# Patient Record
Sex: MALE | Race: WHITE | Employment: OTHER | URBAN - METROPOLITAN AREA
[De-identification: names, ages, dates, MRNs, and addresses within clinical notes are randomized per-mention and may not be internally consistent; named-entity substitution may affect disease eponyms.]

---

## 2017-06-27 ENCOUNTER — OFFICE VISIT (OUTPATIENT)
Dept: NEUROLOGY | Age: 72
End: 2017-06-27

## 2017-06-27 VITALS
RESPIRATION RATE: 16 BRPM | WEIGHT: 252 LBS | BODY MASS INDEX: 35.28 KG/M2 | OXYGEN SATURATION: 97 % | DIASTOLIC BLOOD PRESSURE: 76 MMHG | SYSTOLIC BLOOD PRESSURE: 142 MMHG | HEART RATE: 71 BPM | HEIGHT: 71 IN

## 2017-06-27 DIAGNOSIS — G30.1 DEMENTIA OF THE ALZHEIMER'S TYPE WITH LATE ONSET WITHOUT BEHAVIORAL DISTURBANCE (HCC): Primary | ICD-10-CM

## 2017-06-27 DIAGNOSIS — F02.80 DEMENTIA OF THE ALZHEIMER'S TYPE WITH LATE ONSET WITHOUT BEHAVIORAL DISTURBANCE (HCC): Primary | ICD-10-CM

## 2017-06-27 RX ORDER — DIPHENHYDRAMINE HCL 25 MG
25 CAPSULE ORAL
COMMUNITY
End: 2017-12-28 | Stop reason: CLARIF

## 2017-06-27 RX ORDER — IRBESARTAN 150 MG/1
TABLET ORAL
Refills: 6 | COMMUNITY
Start: 2017-06-13 | End: 2018-04-05 | Stop reason: CLARIF

## 2017-06-27 RX ORDER — DIVALPROEX SODIUM 500 MG/1
TABLET, EXTENDED RELEASE ORAL DAILY
COMMUNITY
Start: 2017-06-25 | End: 2019-02-22 | Stop reason: SDUPTHER

## 2017-06-27 NOTE — LETTER
6/27/2017 9:39 PM 
 
Patient:  Sienna Jimenez YOB: 1945 Date of Visit: 6/27/2017 Dear No Recipients: Thank you for referring Mr. Mane Colón to me for evaluation/treatment. Below are the relevant portions of my assessment and plan of care. Consult Subjective:  
 
Sienna Jimenez is a 73 y. o.right-handed  male seen today for evaluation of new problem of increasing confusion and memory loss and progressive dementia and increasing unsteady gait at the request of Dr. Maira Kirk. Patient cannot use a cane or walker successfully because he does not have the cognitive ability to handle them. Patient cognition continues to deteriorate rather fast, the family is trying to handle him at home, and does have sitters in the house 7 days a week now for about 7 hours a day. They give the patient his medication and his wife to her medication, arrange the meals, and try to get him to exercise some. The family does not want to place him in a nursing home and the patient themselves absolutely refuse to go to assisted living. He is doing better since placed on Depakote 500 mg twice a day by his psychiatrist, but seems to be getting a little bit away but the family thinks is stable now. Neurontin seemed to make him worse. He is no longer driving. He has had progression of disease and no longer goes to work and his  watches him closely. Family has arranged a power of , living will and are considering long-term management. We discussed his condition with his family and office visit was 40 minutes today, 10 minutes of which was counseling the family. We tried a home health evaluation and therapy, but neither he nor his wife tolerated strangers in the house very well. The family notices that he is having more difficulty finishing sentences and finishing conversation.  He is also taking Lexapro, and with some evidence that the SSRIs helped dementia, we will keep him on that. He tries to exercise, take his vitamins and vitamin D., and remained mentally and physically active. He has had no other new focal weakness sensory loss visual changes or gait problems or bowel or bladder problems. He has had a normal CT of the head and normal metabolic screening for treatable causes of his memory loss. Wife has verified with the patient's  that he seems to be doing his job while, and not having any problems. 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 He wants to be has proactive as possible in treating his illness. He is having more problems dealing with the stress of his wife's illness and memory loss and her obsessive behavior. We had long counseling with patient and wife and her daughters, and he is to continue his Lexapro for anxiety and depression, and his wife's medications were adjusted. We also talked to the daughters, about family therapy. The patient has not had any new medical problems complications and or illnesses. He has a past history of memory loss, high blood pressure, anxiety and vasectomy. Past Medical History:  
Diagnosis Date  Anxiety disorder  Depression  Essential hypertension  Memory disorder  Neurological disorder Past Surgical History:  
Procedure Laterality Date [address] Family History Problem Relation Age of Onset  Dementia Mother  Heart Disease Father Social History Substance Use Topics  Smoking status: Never Smoker  Smokeless tobacco: Never Used  Alcohol use No  
   
Current Outpatient Prescriptions Medication Sig Dispense Refill  divalproex ER (DEPAKOTE ER) 500 mg ER tablet  irbesartan (AVAPRO) 150 mg tablet TAKE 1 TABLET BY MOUTH EVERY DAY  6  
 diphenhydrAMINE (BENADRYL) 25 mg capsule Take 25 mg by mouth nightly.  memantine-donepezil (NAMZARIC) 28-10 mg CSpX Take 1 Cap by mouth daily. 30 Cap 11  
 escitalopram oxalate (LEXAPRO) 20 mg tablet TAKE ONE TABLET BY MOUTH ONE TIME DAILY 30 Tab 11  
 spironolactone (ALDACTONE) 25 mg tablet  tamsulosin (FLOMAX) 0.4 mg capsule Take 0.4 mg by mouth daily.  vitamin e 400 unit tab Take  by mouth.  VIT C/TERELL AC/LUT/COPPER/ZNOX (PRESERVISION LUTEIN PO) Take  by mouth.  aspirin 81 mg tablet Take 81 mg by mouth.  multivitamins-minerals-lutein (CENTRUM SILVER) Tab Take  by mouth.  Cholecalciferol, Vitamin D3, (VITAMIN D3) 1,000 unit cap Take  by mouth. No Known Allergies Review of Systems: A comprehensive review of systems was negative except for: Neurological: positive for memory problems Objective: I 
 
 
NEUROLOGICAL EXAM: 
 
Appearance: The patient is well developed, well nourished, provides a incoherent history and is in no acute distress. Mental Status: Oriented to place and person not the president and not the date. Patient can not remember one of 3 words at 30 seconds with distraction. Difficulty naming his children initially. Patient still a little mentally slow and has difficulty with words and names at times. Patient has difficult time doing clock drawing, and has moderate recent memory loss. Mood and affect appropriate. Cranial Nerves:   Intact visual fields. Fundi are benign. JAQUELIN, EOM's full, no nystagmus, no ptosis. Facial sensation is normal. Corneal reflexes are not tested. Facial movement is symmetric. Hearing is abnormal bilaterally. Palate is midline with normal sternocleidomastoid and trapezius muscles are normal. Tongue is midline. Neck without meningismus or bruits Motor:  4/5 strength in upper and lower proximal and distal muscles. Normal bulk and tone. No fasciculations. Reflexes:   Deep tendon reflexes 2+/4 and symmetrical. 
No Babinski or clonus present Sensory:   Normal to touch, pinprick and vibration and DSS. Gait:  Abnormal gait as patient is unsteady when he initially gets up and starts to move and on turns. Tremor:   No tremor noted. Cerebellar:  Abnormal Romberg and tandem cerebellar signs present. Neurovascular:  Normal heart sounds and regular rhythm, peripheral pulses decreased, and no carotid bruits. Assessment:  
 
 
 
Plan: Moderate to severe dementia consistent with Alzheimer's disease Patient to continue Depakote 500 mg twice daily which seems to be providing some control of agitation and confusion We advised him to watch his weight gain, and the family will monitor that. He is to continue Lexapro, Namzaric and neither melatonin nor Neurontin were tolerated He is to continue his multivitamins and vitamin D on a regular basis and remained mentally and physically active He is no longer driving or working at this time Patient condition discussed with family and 20 minutes spent counseling the family, about the disease, medications and prognosis, and they were advised to start planning for long-term care, get power of  and living Will We will see him again in 6 months, earlier if needed. He will call if any problems. Discussed his diagnosis, treatment options and the possibility of new medications in the future with thepatient and his familyI am that it little Signed By: Malena Cuellar MD   
 June 27, 2017 If you have questions, please do not hesitate to call me. I look forward to following Mr. Marvel Severance along with you. Sincerely, Malena Cuellar MD

## 2017-06-27 NOTE — PATIENT INSTRUCTIONS

## 2017-06-28 NOTE — PROGRESS NOTES
Consult    Subjective:     Donovan Tierney is a 73 y. o.right-handed  male seen today for evaluation of new problem of increasing confusion and memory loss and progressive dementia and increasing unsteady gait at the request of Dr. Cristiana Dia. Patient cannot use a cane or walker successfully because he does not have the cognitive ability to handle them. Patient cognition continues to deteriorate rather fast, the family is trying to handle him at home, and does have sitters in the house 7 days a week now for about 7 hours a day. They give the patient his medication and his wife to her medication, arrange the meals, and try to get him to exercise some. The family does not want to place him in a nursing home and the patient themselves absolutely refuse to go to assisted living. He is doing better since placed on Depakote 500 mg twice a day by his psychiatrist, but seems to be getting a little bit away but the family thinks is stable now. Neurontin seemed to make him worse. He is no longer driving. He has had progression of disease and no longer goes to work and his  watches him closely. Family has arranged a power of , living will and are considering long-term management. We discussed his condition with his family and office visit was 40 minutes today, 10 minutes of which was counseling the family. We tried a home health evaluation and therapy, but neither he nor his wife tolerated strangers in the house very well. The family notices that he is having more difficulty finishing sentences and finishing conversation. He is also taking Lexapro, and with some evidence that the SSRIs helped dementia, we will keep him on that. He tries to exercise, take his vitamins and vitamin D., and remained mentally and physically active. He has had no other new focal weakness sensory loss visual changes or gait problems or bowel or bladder problems.  He has had a normal CT of the head and normal metabolic screening for treatable causes of his memory loss. Wife has verified with the patient's  that he seems to be doing his job while, and not having any problems. 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 He wants to be has proactive as possible in treating his illness. He is having more problems dealing with the stress of his wife's illness and memory loss and her obsessive behavior. We had long counseling with patient and wife and her daughters, and he is to continue his Lexapro for anxiety and depression, and his wife's medications were adjusted. We also talked to the daughters, about family therapy. The patient has not had any new medical problems complications and or illnesses. He has a past history of memory loss, high blood pressure, anxiety and vasectomy. Past Medical History:   Diagnosis Date    Anxiety disorder     Depression     Essential hypertension     Memory disorder     Neurological disorder       Past Surgical History:   Procedure Laterality Date    HX VASECTOMY  67     Family History   Problem Relation Age of Onset    Dementia Mother     Heart Disease Father       Social History   Substance Use Topics    Smoking status: Never Smoker    Smokeless tobacco: Never Used    Alcohol use No       Current Outpatient Prescriptions   Medication Sig Dispense Refill    divalproex ER (DEPAKOTE ER) 500 mg ER tablet       irbesartan (AVAPRO) 150 mg tablet TAKE 1 TABLET BY MOUTH EVERY DAY  6    diphenhydrAMINE (BENADRYL) 25 mg capsule Take 25 mg by mouth nightly.  memantine-donepezil (NAMZARIC) 28-10 mg CSpX Take 1 Cap by mouth daily. 30 Cap 11    escitalopram oxalate (LEXAPRO) 20 mg tablet TAKE ONE TABLET BY MOUTH ONE TIME DAILY 30 Tab 11    spironolactone (ALDACTONE) 25 mg tablet       tamsulosin (FLOMAX) 0.4 mg capsule Take 0.4 mg by mouth daily.  vitamin e 400 unit tab Take  by mouth.       VIT C/TERELL AC/LUT/COPPER/ZNOX (PRESERVISION LUTEIN PO) Take by mouth.  aspirin 81 mg tablet Take 81 mg by mouth.  multivitamins-minerals-lutein (CENTRUM SILVER) Tab Take  by mouth.  Cholecalciferol, Vitamin D3, (VITAMIN D3) 1,000 unit cap Take  by mouth. No Known Allergies     Review of Systems:  A comprehensive review of systems was negative except for: Neurological: positive for memory problems     Objective:     I      NEUROLOGICAL EXAM:    Appearance: The patient is well developed, well nourished, provides a incoherent history and is in no acute distress. Mental Status: Oriented to place and person not the president and not the date. Patient can not remember one of 3 words at 30 seconds with distraction. Difficulty naming his children initially. Patient still a little mentally slow and has difficulty with words and names at times. Patient has difficult time doing clock drawing, and has moderate recent memory loss. Mood and affect appropriate. Cranial Nerves:   Intact visual fields. Fundi are benign. JAQUELIN, EOM's full, no nystagmus, no ptosis. Facial sensation is normal. Corneal reflexes are not tested. Facial movement is symmetric. Hearing is abnormal bilaterally. Palate is midline with normal sternocleidomastoid and trapezius muscles are normal. Tongue is midline. Neck without meningismus or bruits   Motor:  4/5 strength in upper and lower proximal and distal muscles. Normal bulk and tone. No fasciculations. Reflexes:   Deep tendon reflexes 2+/4 and symmetrical.  No Babinski or clonus present   Sensory:   Normal to touch, pinprick and vibration and DSS. Gait:  Abnormal gait as patient is unsteady when he initially gets up and starts to move and on turns. Tremor:   No tremor noted. Cerebellar:  Abnormal Romberg and tandem cerebellar signs present. Neurovascular:  Normal heart sounds and regular rhythm, peripheral pulses decreased, and no carotid bruits. Assessment:         Plan:      Moderate to severe dementia consistent with Alzheimer's disease  Patient to continue Depakote 500 mg twice daily which seems to be providing some control of agitation and confusion  We advised him to watch his weight gain, and the family will monitor that. He is to continue Lexapro, Namzaric and neither melatonin nor Neurontin were tolerated  He is to continue his multivitamins and vitamin D on a regular basis and remained mentally and physically active  He is no longer driving or working at this time  Patient condition discussed with family and 20 minutes spent counseling the family, about the disease, medications and prognosis, and they were advised to start planning for long-term care, get power of  and living Will  We will see him again in 6 months, earlier if needed. He will call if any problems.   Discussed his diagnosis, treatment options and the possibility of new medications in the future with thepatient and his familyI am that it little      Signed By: Josr Marvin MD     June 27, 2017

## 2017-09-29 DIAGNOSIS — F02.80 DEMENTIA OF THE ALZHEIMER'S TYPE WITH LATE ONSET WITHOUT BEHAVIORAL DISTURBANCE (HCC): ICD-10-CM

## 2017-09-29 DIAGNOSIS — G30.1 DEMENTIA OF THE ALZHEIMER'S TYPE WITH LATE ONSET WITHOUT BEHAVIORAL DISTURBANCE (HCC): ICD-10-CM

## 2017-09-29 NOTE — TELEPHONE ENCOUNTER
Future Appointments  Date Time Provider Ely Rowland   1/9/2018 2:00 PM Clarita Jaramillo MD [address]                         Last Appointment My Department:  6/27/2017    Please advise of refill below. Requested Prescriptions     Pending Prescriptions Disp Refills    memantine-donepezil (NAMZARIC) 28-10 mg CSpX 90 Cap 1     Sig: Take 1 Cap by mouth daily.       Patient would like 90 day fill

## 2017-10-05 RX ORDER — ESCITALOPRAM OXALATE 20 MG/1
TABLET ORAL
Qty: 30 TAB | Refills: 6 | Status: SHIPPED | OUTPATIENT
Start: 2017-10-05 | End: 2017-12-28 | Stop reason: CLARIF

## 2017-10-05 NOTE — TELEPHONE ENCOUNTER
Future Appointments  Date Time Provider Ely Janett   1/9/2018 2:00 PM Nettie Colin MD [address]                         Last Appointment My Department:  6/27/2017    Please advise of refill below.    Requested Prescriptions     Pending Prescriptions Disp Refills    escitalopram oxalate (LEXAPRO) 20 mg tablet [Pharmacy Med Name: ESCITALOPRAM 20 MG TABLET] 30 Tab 6     Sig: TAKE ONE TABLET BY MOUTH ONE TIME DAILY

## 2017-10-12 ENCOUNTER — TELEPHONE (OUTPATIENT)
Dept: NEUROLOGY | Age: 72
End: 2017-10-12

## 2017-10-12 NOTE — TELEPHONE ENCOUNTER
Patients daughter is calling regarding she is concerned about her dad. He had really declined the last few months and is extremely depressed. Dr Skye Fall is off this week and wants to know if there is something he can prescribe. He is taking Lexapro 20mg. He does not do good with anit-psychotic meds.

## 2017-10-12 NOTE — TELEPHONE ENCOUNTER
I called the patient's daughter and notified her of below information. She would like to call today to see if any changes can be made sooner than next Monday. She did notify me that instead of decreasing the Haldol, they stopped the medication all together. She stated that since being on the medication he exhibited inappropriate behavior towards nurses. Once they stopped the medication this Monday, he has not had any other episodes. Update on patient since 6/2017: In July 2017 he went to the eye doctor as his vision was decreasing. Patient has known macular degeneration, but now his vision is very low. She thinks that since then he has gone downhill. She thinks that in the past 2 weeks or so he doesn't smile, wants to sit around and do nothing, not walking, doesn't want to eat, wants to be left alone. States this is not like him at all. Patient has been on Lexapro for 20 years. She was concerned about changing his medications. I did advise her that she should discuss this with Dr [place] office as they have the most updated list of medications and what he has tried. She verbalized understanding and will call today.    I did notify her that I would also send to Dr Benjamin Beltran to review

## 2017-10-12 NOTE — TELEPHONE ENCOUNTER
I called Dr Smiley Parents office and spoke to the nurse practitioner as Dr Aristides Daniel was out of the office:  He is currently on Haldol for agitation which was cut down to once daily because his daughter thinks he is hallucinating. Ms Valeria Sommers thinks he is also on Depakote and Lexapro  There is a new antidepressant called Trintellix that they like and could start by using samples. But, Pradip Guerrier NP thinks that they should hang in to Monday to speak to Dr Aristides Daniel unless the patient's daughter feels this is more pressing and should call their office. She agrees that these medications would be best done at their office as the patient has medication changes through them.

## 2017-10-12 NOTE — TELEPHONE ENCOUNTER
I put in a call to Dr Chidi Kilgore office to discuss in case their office would like to change as they are handling these medications

## 2017-11-01 ENCOUNTER — TELEPHONE (OUTPATIENT)
Dept: NEUROLOGY | Age: 72
End: 2017-11-01

## 2017-11-01 NOTE — TELEPHONE ENCOUNTER
I spoke with the patient's daughter as she is the POA to get the permission to speak with the patient's son and ask what limitations there are in speaking with Beverley Tovar. She advised me that we are able to answer questions that he might have. She does not know why he has called, but they are not needing to change any care at this point as they are working with Dr Dionna Puente for his psychiatric care.   Left message for  Sahara Puga to call back

## 2017-11-01 NOTE — TELEPHONE ENCOUNTER
----- Message from Annita Delarosa sent at 11/1/2017  9:16 AM EDT -----  Regarding: Dr. Lillie Chapa  Pt's son Fer Carreon [phone] states he would like to know the status of where you think his dad is, states he is going backward and not progressing. Please call at your convenience.

## 2017-11-01 NOTE — TELEPHONE ENCOUNTER
Left message for patient's daughter to call back.  On permission form, it states that the son only has limited access and need to find out what can be said

## 2017-12-28 ENCOUNTER — OFFICE VISIT (OUTPATIENT)
Dept: NEUROLOGY | Age: 72
End: 2017-12-28

## 2017-12-28 VITALS
DIASTOLIC BLOOD PRESSURE: 86 MMHG | SYSTOLIC BLOOD PRESSURE: 132 MMHG | HEART RATE: 82 BPM | OXYGEN SATURATION: 95 % | HEIGHT: 71 IN

## 2017-12-28 DIAGNOSIS — G20 PARKINSON'S DISEASE (TREMOR, STIFFNESS, SLOW MOTION, UNSTABLE POSTURE) (HCC): ICD-10-CM

## 2017-12-28 DIAGNOSIS — R26.9 ABNORMALITY OF GAIT AND MOBILITY: ICD-10-CM

## 2017-12-28 DIAGNOSIS — G30.1 DEMENTIA OF THE ALZHEIMER'S TYPE WITH LATE ONSET WITHOUT BEHAVIORAL DISTURBANCE (HCC): Primary | ICD-10-CM

## 2017-12-28 DIAGNOSIS — F02.80 DEMENTIA OF THE ALZHEIMER'S TYPE WITH LATE ONSET WITHOUT BEHAVIORAL DISTURBANCE (HCC): Primary | ICD-10-CM

## 2017-12-28 RX ORDER — SERTRALINE HYDROCHLORIDE 100 MG/1
TABLET, FILM COATED ORAL
Refills: 5 | COMMUNITY
Start: 2017-12-12 | End: 2019-06-14

## 2017-12-28 RX ORDER — CARBIDOPA AND LEVODOPA 25; 100 MG/1; MG/1
1 TABLET ORAL 3 TIMES DAILY
Qty: 100 TAB | Refills: 11 | Status: SHIPPED | OUTPATIENT
Start: 2017-12-28 | End: 2018-04-03 | Stop reason: SDUPTHER

## 2017-12-28 RX ORDER — ASCORBIC ACID 500 MG
TABLET ORAL 2 TIMES DAILY
COMMUNITY
End: 2019-06-14

## 2017-12-28 NOTE — PATIENT INSTRUCTIONS
Please be advised there is a $25 fee for all paperwork to be completed from our  providers. This is to be paid by the patient prior to picking up the completed forms.

## 2017-12-28 NOTE — LETTER
12/28/2017 3:58 PM 
 
Patient:  Kayla Alonzo YOB: 1945 Date of Visit: 12/28/2017 Dear No Recipients: Thank you for referring Mr. Adair Alvarez to me for evaluation/treatment. Below are the relevant portions of my assessment and plan of care. Consult Subjective:  
 
Kayla Alonzo is a 81 y. o.right-handed  male seen today for evaluation of new problem of increasing weakness and lack of mobility and some increasing tremors more when he tries to do things and more difficulty walking which he cannot do without assistance now and increasing confusion and memory loss and progressive dementia and increasing unsteady gait at the request of Dr. Aniyah Bean. Patient seems to have developed parkinsonian symptoms, with increased generalized rigidity, slowness of movement, bradykinesia, cogwheel rigidity, but not much resting tremor but he does seem to have a little bit of an essential tremor or static tremor. He also has masklike facies. He is probably getting insomnia and from his Zyprexa. Less likely has primary Parkinson's disease. He does not actively hallucinating all that much. Patient just seems to be slower and having more difficulty getting about, and also having spells that he will get shaky all over and sometimes even gag. On reviewing the videos of these, it looks like he is just very anxious and gets very rigid and tremulous because he is confused and agitated. He tends to sleep more, and tends to snore a lot, but the daughter and the family do not think he can tolerate a sleep test, nor could he tolerate CPAP. I advised him that should try to get him sitting in an upright position or more upright position to see if he can sleep that way without snoring as much. He has had no new focal weakness, but just generally weak all over. He seems to be much more rigid and generally slow and having difficulty moving and having a masslike facies. Is really not on any neuroleptics. Patient cannot use a cane or walker successfully because he does not have the cognitive ability to handle them. Patient cognition continues to deteriorate rather fast, the family is trying to handle him at home, and does have sitters in the house 7 days a week now for 24 hours a day. They give the patient his medication and his wife to her medication, arrange the meals, and try to get him to exercise some. The family does not want to place him in a nursing home and the patient themselves absolutely refuse to go to assisted living. He is on Depakote 500 mg twice a day by his psychiatrist, and patient now on Zoloft 100 mg a day, with some recent weight loss, and also taking them Namzaric the 8 mg once a day, and on Zyprexa 5 mg a day. Neurontin seemed to make him worse that was discontinued. He is no longer driving. He has had progression of disease and no longer goes to work and his  watches him closely. Family has arranged a power of , living will and are considering long-term management. We discussed his condition with his family and office visit was 40 minutes today, 10 minutes of which was counseling the family. We tried a home health evaluation and therapy, but neither he nor his wife tolerated strangers in the house very well. The family notices that he is having more difficulty finishing sentences and finishing conversation. He is also taking Lexapro, and with some evidence that the SSRIs helped dementia, we will keep him on that. He tries to exercise, take his vitamins and vitamin D., and remained mentally and physically active. He has had no other new focal weakness sensory loss visual changes or gait problems or bowel or bladder problems.  He has had a normal CT of the head and normal metabolic screening for treatable causes of his memory loss recently on a hospital visit he was found to have urinary tract infection. We discussed urinary suppression, we will try vitamin C 1000 mg first. 
We had long counseling with patient and wife and her daughters, and he is to continue his Lexapro for anxiety and depression, and his wife's medications were adjusted. We also talked to the daughters, about family therapy. The patient has not had any new medical problems complications and or illnesses. He has a past history of memory loss, high blood pressure, anxiety and vasectomy. Past Medical History:  
Diagnosis Date  Anxiety disorder  Depression  Essential hypertension  Memory disorder  Neurological disorder Past Surgical History:  
Procedure Laterality Date [address] Family History Problem Relation Age of Onset  Dementia Mother  Heart Disease Father Social History Substance Use Topics  Smoking status: Never Smoker  Smokeless tobacco: Never Used  Alcohol use No  
   
Current Outpatient Prescriptions Medication Sig Dispense Refill  sertraline (ZOLOFT) 100 mg tablet TAKE 1 TABLET BY MOUTH EVERY MORNING  5  
 carbidopa-levodopa (SINEMET)  mg per tablet Take 1 Tab by mouth three (3) times daily. Indications: Parkinsonism, Restless Legs Syndrome 100 Tab 11  
 ascorbic acid, vitamin C, (VITAMIN C) 500 mg tablet Take  by mouth two (2) times a day.  memantine-donepezil (NAMZARIC) 28-10 mg CSpX Take 1 Cap by mouth daily. 90 Cap 1  divalproex ER (DEPAKOTE ER) 500 mg ER tablet daily.  irbesartan (AVAPRO) 150 mg tablet TAKE 1 TABLET BY MOUTH EVERY DAY  6  
 spironolactone (ALDACTONE) 25 mg tablet 25 mg two (2) times a day.  tamsulosin (FLOMAX) 0.4 mg capsule Take 0.4 mg by mouth daily.  vitamin e 400 unit tab Take  by mouth.  VIT C/TERELL AC/LUT/COPPER/ZNOX (PRESERVISION LUTEIN PO) Take  by mouth.  aspirin 81 mg tablet Take 81 mg by mouth.  multivitamins-minerals-lutein (CENTRUM SILVER) Tab Take  by mouth.  Cholecalciferol, Vitamin D3, (VITAMIN D3) 1,000 unit cap Take  by mouth. No Known Allergies Review of Systems: A comprehensive review of systems was negative except for: Neurological: positive for memory problems Objective: I 
 
 
NEUROLOGICAL EXAM: 
 
Appearance: The patient is well developed, well nourished, provides a incoherent history and is in no acute distress. Mental Status: Oriented to place and person not the president and not the date. Patient can not remember one of 3 words at 30 seconds with distraction. Difficulty naming his children initially. Patient still a little mentally slow and has difficulty with words and names at times. Patient has difficult time doing clock drawing, and has moderate recent memory loss. Mood and affect appropriate. Cranial Nerves:   Intact visual fields. Fundi are benign. JAQUELIN, EOM's full, no nystagmus, no ptosis. Facial sensation is normal. Corneal reflexes are not tested. Facial movement is symmetric, but patient has masklike facies. Hearing is abnormal bilaterally. Palate is midline with normal sternocleidomastoid and trapezius muscles are normal. Tongue is midline. Neck without meningismus or bruits Temporal arteries are not tender or enlarged Motor:  4/5 strength in upper and lower proximal and distal muscles. Normal bulk and increased tone eyes with cogwheel rigidity prominent in the upper extremities. . No fasciculations. Reflexes:   Deep tendon reflexes 2+/4 and symmetrical. 
No Babinski or clonus present Sensory:   Normal to touch, pinprick and vibration and DSS. Gait:  Abnormal gait as patient is unsteady when he initially gets up and starts to move and on turns, and patient needs assistance to ambulate. Tremor:    Mild bilateral static and action tremor noted. Cerebellar:  Abnormal Romberg and tandem cerebellar signs present.   
Neurovascular:  Normal heart sounds and regular rhythm, peripheral pulses decreased, and no carotid bruits. Assessment:  
 
 
 
Plan: With increasing weakness and difficulty walking, associated with parkinsonian-like features probably secondary to his neuroleptics of Zyprexa. We will start him on some Sinemet to see if he gets better that can help his mobility and walking to his family can handle him better at home. He is to advance from one half tablet 3 times a day in a week or 2 to go to a whole tablet 3 times a day We advised the family about GI side effects, compulsive behavior, or any side effect or increased confusion to call us immediately. His weight loss is probably related to the Zoloft, but he will continue that Moderate to severe dementia consistent with Alzheimer's disease Patient to continue Depakote 500 mg twice daily which seems to be providing some control of agitation and confusion We advised him to watch his weight gain, and the family will monitor that. He is to continue Zoloft and his Zyprexa and Namzaric and neither melatonin nor Neurontin were tolerated He is to continue his multivitamins and vitamin D on a regular basis and remained mentally and physically active He is no longer driving or working at this time Patient condition discussed with family and 20 minutes spent counseling the family, about the disease, medications and prognosis, and they were advised to start planning for long-term care, get power of  and living Will We will see him again in 6 months, earlier if needed. He will call if any problems. Discussed his diagnosis, treatment options and the possibility of new medications in the future with the patient and his family Signed By: Jose Early MD   
 December 28, 2017 This note will not be viewable in [address] If you have questions, please do not hesitate to call me. I look forward to following Mr. Ben Mendez along with you. Sincerely, Jose Early MD

## 2017-12-28 NOTE — MR AVS SNAPSHOT
Visit Information Date & Time Provider Department Dept. Phone Encounter #  
 12/28/2017 12:00 PM Jeanne Durán MD [facility] [phone] [id number] Follow-up Instructions Return in about 3 months (around 3/28/2018). Upcoming Health Maintenance Date Due Hepatitis C Screening 1945 DTaP/Tdap/Td series (1 - Tdap) 8/30/1968 FOBT Q 1 YEAR AGE 50-75 8/30/1997 ZOSTER VACCINE AGE 60> 6/30/2007 GLAUCOMA SCREENING Q2Y 8/30/2012 Pneumococcal 65+ Low/Medium Risk (1 of 2 - PCV13) 8/30/2012 MEDICARE YEARLY EXAM 8/30/2012 Influenza Age 5 to Adult 8/1/2017 Allergies as of 12/28/2017  Review Complete On: 12/28/2017 By: Jeanne Durán MD  
 No Known Allergies Current Immunizations  Never Reviewed No immunizations on file. Not reviewed this visit You Were Diagnosed With   
  
 Codes Comments Dementia of the Alzheimer's type with late onset without behavioral disturbance    -  Primary ICD-10-CM: G30.1, F02.80 ICD-9-CM: 331.0, 294.10 Parkinson's disease (tremor, stiffness, slow motion, unstable posture) ([facility] [address]     ICD-10-CM: G20 
ICD-9-CM: 332.0 Vitals BP Pulse Height(growth percentile) SpO2 Smoking Status 132/86 82 5' 11\" (1.803 m) 95% Never Smoker Preferred Pharmacy Pharmacy Name Phone [facility] [address] [place] [address] Rd [phone] Your Updated Medication List  
  
   
This list is accurate as of: 12/28/17  1:04 PM.  Always use your most recent med list.  
  
  
  
  
 ascorbic acid (vitamin C) 500 mg tablet Commonly known as:  VITAMIN C Take  by mouth two (2) times a day. aspirin 81 mg tablet Take 81 mg by mouth.  
  
 carbidopa-levodopa  mg per tablet Commonly known as:  SINEMET Take 1 Tab by mouth three (3) times daily. Indications: Parkinsonism, Restless Legs Syndrome CENTRUM SILVER Tab tablet Generic drug:  multivitamins-minerals-lutein Take  by mouth. divalproex  mg ER tablet Commonly known as:  DEPAKOTE ER  
daily. irbesartan 150 mg tablet Commonly known as:  AVAPRO TAKE 1 TABLET BY MOUTH EVERY DAY  
  
 memantine-donepezil 28-10 mg Cspx Commonly known as:  MOTION [facility] [employer] [facility] Take 1 Cap by mouth daily. PRESERVISION LUTEIN PO Take  by mouth. sertraline 100 mg tablet Commonly known as:  ZOLOFT  
TAKE 1 TABLET BY MOUTH EVERY MORNING  
  
 spironolactone 25 mg tablet Commonly known as:  ALDACTONE  
25 mg two (2) times a day. tamsulosin 0.4 mg capsule Commonly known as:  FLOMAX Take 0.4 mg by mouth daily. VITAMIN D3 1,000 unit Cap Generic drug:  cholecalciferol Take  by mouth.  
  
 vitamin e 400 unit Tab Take  by mouth. Prescriptions Sent to Pharmacy Refills  
 carbidopa-levodopa (SINEMET)  mg per tablet 11 Sig: Take 1 Tab by mouth three (3) times daily. Indications: Parkinsonism, Restless Legs Syndrome Class: Normal  
 Pharmacy: [facility] [id number] [place] [facility] Sandra Soler, [address] [place] [address] Présalfredo Olmos  #: [phone] Route: Oral  
  
Follow-up Instructions Return in about 3 months (around 3/28/2018). Patient Instructions Please be advised there is a $25 fee for all paperwork to be completed from our  providers. This is to be paid by the patient prior to picking up the completed forms. Introducing [facility]! Dear Anil Mckeon: Thank you for requesting a [employer] account. Our records indicate that you have previously registered for a [employer] account but its currently inactive. Please call our [employer] support line at [phone]. Additional Information If you have questions, please visit the Frequently Asked Questions section of the [employer] website at [email or web address] Remember, [employer] is NOT to be used for urgent needs. For medical emergencies, dial 911. Now available from your iPhone and Android! Please provide this summary of care documentation to your next provider. Your primary care clinician is listed as Dereje Lane. If you have any questions after today's visit, please call [phone].

## 2017-12-28 NOTE — PROGRESS NOTES
Consult    Subjective:     Ruba Garcia is a 81 y. o.right-handed  male seen today for evaluation of new problem of increasing weakness and lack of mobility and some increasing tremors more when he tries to do things and more difficulty walking which he cannot do without assistance now and increasing confusion and memory loss and progressive dementia and increasing unsteady gait at the request of Dr. Christie Nelson. Patient seems to have developed parkinsonian symptoms, with increased generalized rigidity, slowness of movement, bradykinesia, cogwheel rigidity, but not much resting tremor but he does seem to have a little bit of an essential tremor or static tremor. He also has masklike facies. He is probably getting insomnia and from his Zyprexa. Less likely has primary Parkinson's disease. He does not actively hallucinating all that much. Patient just seems to be slower and having more difficulty getting about, and also having spells that he will get shaky all over and sometimes even gag. On reviewing the videos of these, it looks like he is just very anxious and gets very rigid and tremulous because he is confused and agitated. He tends to sleep more, and tends to snore a lot, but the daughter and the family do not think he can tolerate a sleep test, nor could he tolerate CPAP. I advised him that should try to get him sitting in an upright position or more upright position to see if he can sleep that way without snoring as much. He has had no new focal weakness, but just generally weak all over. He seems to be much more rigid and generally slow and having difficulty moving and having a masslike facies. Is really not on any neuroleptics. Patient cannot use a cane or walker successfully because he does not have the cognitive ability to handle them.   Patient cognition continues to deteriorate rather fast, the family is trying to handle him at home, and does have sitters in the house 7 days a week now for 24 hours a day. They give the patient his medication and his wife to her medication, arrange the meals, and try to get him to exercise some. The family does not want to place him in a nursing home and the patient themselves absolutely refuse to go to assisted living. He is on Depakote 500 mg twice a day by his psychiatrist, and patient now on Zoloft 100 mg a day, with some recent weight loss, and also taking them Namzaric the 8 mg once a day, and on Zyprexa 5 mg a day. Neurontin seemed to make him worse that was discontinued. He is no longer driving. He has had progression of disease and no longer goes to work and his  watches him closely. Family has arranged a power of , living will and are considering long-term management. We discussed his condition with his family and office visit was 40 minutes today, 10 minutes of which was counseling the family. We tried a home health evaluation and therapy, but neither he nor his wife tolerated strangers in the house very well. The family notices that he is having more difficulty finishing sentences and finishing conversation. He is also taking Lexapro, and with some evidence that the SSRIs helped dementia, we will keep him on that. He tries to exercise, take his vitamins and vitamin D., and remained mentally and physically active. He has had no other new focal weakness sensory loss visual changes or gait problems or bowel or bladder problems. He has had a normal CT of the head and normal metabolic screening for treatable causes of his memory loss recently on a hospital visit he was found to have urinary tract infection. We discussed urinary suppression, we will try vitamin C 1000 mg first.  We had long counseling with patient and wife and her daughters, and he is to continue his Lexapro for anxiety and depression, and his wife's medications were adjusted. We also talked to the daughters, about family therapy.     The patient has not had any new medical problems complications and or illnesses. He has a past history of memory loss, high blood pressure, anxiety and vasectomy. Past Medical History:   Diagnosis Date    Anxiety disorder     Depression     Essential hypertension     Memory disorder     Neurological disorder       Past Surgical History:   Procedure Laterality Date    HX VASECTOMY  67     Family History   Problem Relation Age of Onset    Dementia Mother     Heart Disease Father       Social History   Substance Use Topics    Smoking status: Never Smoker    Smokeless tobacco: Never Used    Alcohol use No       Current Outpatient Prescriptions   Medication Sig Dispense Refill    sertraline (ZOLOFT) 100 mg tablet TAKE 1 TABLET BY MOUTH EVERY MORNING  5    carbidopa-levodopa (SINEMET)  mg per tablet Take 1 Tab by mouth three (3) times daily. Indications: Parkinsonism, Restless Legs Syndrome 100 Tab 11    ascorbic acid, vitamin C, (VITAMIN C) 500 mg tablet Take  by mouth two (2) times a day.  memantine-donepezil (NAMZARIC) 28-10 mg CSpX Take 1 Cap by mouth daily. 90 Cap 1    divalproex ER (DEPAKOTE ER) 500 mg ER tablet daily.  irbesartan (AVAPRO) 150 mg tablet TAKE 1 TABLET BY MOUTH EVERY DAY  6    spironolactone (ALDACTONE) 25 mg tablet 25 mg two (2) times a day.  tamsulosin (FLOMAX) 0.4 mg capsule Take 0.4 mg by mouth daily.  vitamin e 400 unit tab Take  by mouth.  VIT C/TERELL AC/LUT/COPPER/ZNOX (PRESERVISION LUTEIN PO) Take  by mouth.  aspirin 81 mg tablet Take 81 mg by mouth.  multivitamins-minerals-lutein (CENTRUM SILVER) Tab Take  by mouth.  Cholecalciferol, Vitamin D3, (VITAMIN D3) 1,000 unit cap Take  by mouth. No Known Allergies     Review of Systems:  A comprehensive review of systems was negative except for: Neurological: positive for memory problems     Objective:     I      NEUROLOGICAL EXAM:    Appearance:   The patient is well developed, well nourished, provides a incoherent history and is in no acute distress. Mental Status: Oriented to place and person not the president and not the date. Patient can not remember one of 3 words at 30 seconds with distraction. Difficulty naming his children initially. Patient still a little mentally slow and has difficulty with words and names at times. Patient has difficult time doing clock drawing, and has moderate recent memory loss. Mood and affect appropriate. Cranial Nerves:   Intact visual fields. Fundi are benign. JAQUELIN, EOM's full, no nystagmus, no ptosis. Facial sensation is normal. Corneal reflexes are not tested. Facial movement is symmetric, but patient has masklike facies. Hearing is abnormal bilaterally. Palate is midline with normal sternocleidomastoid and trapezius muscles are normal. Tongue is midline. Neck without meningismus or bruits  Temporal arteries are not tender or enlarged   Motor:  4/5 strength in upper and lower proximal and distal muscles. Normal bulk and increased tone eyes with cogwheel rigidity prominent in the upper extremities. . No fasciculations. Reflexes:   Deep tendon reflexes 2+/4 and symmetrical.  No Babinski or clonus present   Sensory:   Normal to touch, pinprick and vibration and DSS. Gait:  Abnormal gait as patient is unsteady when he initially gets up and starts to move and on turns, and patient needs assistance to ambulate. Tremor:    Mild bilateral static and action tremor noted. Cerebellar:  Abnormal Romberg and tandem cerebellar signs present. Neurovascular:  Normal heart sounds and regular rhythm, peripheral pulses decreased, and no carotid bruits. Assessment:         Plan: With increasing weakness and difficulty walking, associated with parkinsonian-like features probably secondary to his neuroleptics of Zyprexa.   We will start him on some Sinemet to see if he gets better that can help his mobility and walking to his family can handle him better at home.  He is to advance from one half tablet 3 times a day in a week or 2 to go to a whole tablet 3 times a day  We advised the family about GI side effects, compulsive behavior, or any side effect or increased confusion to call us immediately. His weight loss is probably related to the Zoloft, but he will continue that  Moderate to severe dementia consistent with Alzheimer's disease  Patient to continue Depakote 500 mg twice daily which seems to be providing some control of agitation and confusion  We advised him to watch his weight gain, and the family will monitor that. He is to continue Zoloft and his Zyprexa and Namzaric and neither melatonin nor Neurontin were tolerated  He is to continue his multivitamins and vitamin D on a regular basis and remained mentally and physically active  He is no longer driving or working at this time  Patient condition discussed with family and 20 minutes spent counseling the family, about the disease, medications and prognosis, and they were advised to start planning for long-term care, get power of  and living Will  We will see him again in 6 months, earlier if needed. He will call if any problems. Discussed his diagnosis, treatment options and the possibility of new medications in the future with the patient and his family     Signed By: Breann Barker MD     December 28, 2017       This note will not be viewable in [address]

## 2018-04-03 DIAGNOSIS — G30.1 DEMENTIA OF THE ALZHEIMER'S TYPE WITH LATE ONSET WITHOUT BEHAVIORAL DISTURBANCE (HCC): ICD-10-CM

## 2018-04-03 DIAGNOSIS — G20 PARKINSON'S DISEASE (TREMOR, STIFFNESS, SLOW MOTION, UNSTABLE POSTURE) (HCC): ICD-10-CM

## 2018-04-03 DIAGNOSIS — F02.80 DEMENTIA OF THE ALZHEIMER'S TYPE WITH LATE ONSET WITHOUT BEHAVIORAL DISTURBANCE (HCC): ICD-10-CM

## 2018-04-03 RX ORDER — CARBIDOPA AND LEVODOPA 25; 100 MG/1; MG/1
1 TABLET ORAL 3 TIMES DAILY
Qty: 270 TAB | Refills: 3 | Status: SHIPPED | OUTPATIENT
Start: 2018-04-03 | End: 2019-02-22 | Stop reason: SDUPTHER

## 2018-04-03 NOTE — TELEPHONE ENCOUNTER
Future Appointments  Date Time Provider Ely Rowland   4/5/2018 12:00 PM Ester Fermin MD [address]                         Last Appointment My Department:  12/28/2017    Please advise of refill below. Requesting 90 day supply  Requested Prescriptions     Pending Prescriptions Disp Refills    carbidopa-levodopa (SINEMET)  mg per tablet 270 Tab 3     Sig: Take 1 Tab by mouth three (3) times daily.  Indications: Parkinsonism, Restless Legs Syndrome

## 2018-04-05 ENCOUNTER — OFFICE VISIT (OUTPATIENT)
Dept: NEUROLOGY | Age: 73
End: 2018-04-05

## 2018-04-05 VITALS
OXYGEN SATURATION: 98 % | BODY MASS INDEX: 32.06 KG/M2 | HEART RATE: 83 BPM | DIASTOLIC BLOOD PRESSURE: 66 MMHG | SYSTOLIC BLOOD PRESSURE: 102 MMHG | WEIGHT: 229 LBS | RESPIRATION RATE: 16 BRPM | TEMPERATURE: 98 F | HEIGHT: 71 IN

## 2018-04-05 DIAGNOSIS — G30.1 DEMENTIA OF THE ALZHEIMER'S TYPE WITH LATE ONSET WITHOUT BEHAVIORAL DISTURBANCE (HCC): ICD-10-CM

## 2018-04-05 DIAGNOSIS — G20 PARKINSON'S DISEASE (TREMOR, STIFFNESS, SLOW MOTION, UNSTABLE POSTURE) (HCC): Primary | ICD-10-CM

## 2018-04-05 DIAGNOSIS — F02.80 DEMENTIA OF THE ALZHEIMER'S TYPE WITH LATE ONSET WITHOUT BEHAVIORAL DISTURBANCE (HCC): ICD-10-CM

## 2018-04-05 RX ORDER — MEMANTINE HYDROCHLORIDE AND DONEPEZIL HYDROCHLORIDE 28; 10 MG/1; MG/1
CAPSULE ORAL
Qty: 90 CAP | Refills: 1 | Status: SHIPPED | OUTPATIENT
Start: 2018-04-05 | End: 2018-09-28 | Stop reason: SDUPTHER

## 2018-04-05 RX ORDER — ZONISAMIDE 50 MG/1
50 CAPSULE ORAL DAILY
Qty: 100 CAP | Refills: 11 | Status: SHIPPED | OUTPATIENT
Start: 2018-04-05 | End: 2018-07-05 | Stop reason: CLARIF

## 2018-04-05 NOTE — LETTER
4/5/2018 9:18 PM 
 
Patient:  Kaela Kirk YOB: 1945 Date of Visit: 4/5/2018 Dear No Recipients: Thank you for referring Mr. Lynn Elder to me for evaluation/treatment. Below are the relevant portions of my assessment and plan of care. Consult Subjective:  
 
Kaela Kirk is a 81 y. o.right-handed  male seen today for evaluation of new problem at the request of Dr. Sunitha Ch for evaluation of his medication of Sinemet which she is now taking 25/100 mg tablets 3 times a day but can only take a half a tablet each time because whole tablet makes him more confused agitated and restless. He is walking somewhat better with assistance, and moving a little bit better, but still seems fairly rigid with cogwheeling and bradykinesia. He is more spontaneous. He still looks like he needs more medication, and I am afraid to add a dopamine agonist because of his increased confusion on the Sinemet, and I do not think they can afford Azilect, so we will give him Zonegran 50 mg tablets to take once a day, and we may try to slowly advance him to 100 mg once a day thereafter. This has been reported to help with a movement disorder of Parkinson's. He is also seen for increasing weakness and lack of mobility and some increasing tremors more when he tries to do things and more difficulty walking which he cannot do without assistance now and increasing confusion and memory loss and progressive dementia and increasing unsteady gait. Patient seems to have developed parkinsonian symptoms, with increased generalized rigidity, slowness of movement, bradykinesia, cogwheel rigidity, but not much resting tremor but he does seem to have a little bit of an essential tremor or static tremor. He also has masklike facies. He is probably getting EPS  from his Zyprexa. Less likely has primary Parkinson's disease. He does not actively hallucinating all that much. He has had no new focal weakness, but just generally weak all over. He seems to be much more rigid and generally slow and having difficulty moving and having a masslike facies. Is really not on any neuroleptics. Patient cannot use a cane or walker successfully because he does not have the cognitive ability to handle them. Patient cognition continues to deteriorate rather fast, the family is trying to handle him at home, and does have sitters in the house 7 days a week now for 24 hours a day. They give the patient his medication and his wife to her medication, arrange the meals, and try to get him to exercise some. The family does not want to place him in a nursing home and the patient themselves absolutely refuse to go to assisted living. He is on Depakote 500 mg once a day by his psychiatrist, and patient now on Zoloft 100 mg a day, with some recent weight loss, and also taking them Namzaric  28 mg once a day, and on Zyprexa 5 mg a day. Neurontin seemed to make him worse that was discontinued. He is no longer driving. He has had progression of disease and no longer goes to work and his  watches him closely. Family has arranged a power of , living will and are considering long-term management. We discussed his condition with his family and office visit was 40 minutes today, 20 minutes of which was counseling the family. We tried a home health evaluation and therapy, but neither he nor his wife tolerated strangers in the house very well. The family notices that he is having more difficulty finishing sentences and finishing conversation. He has had no other new focal weakness sensory loss visual changes or gait problems or bowel or bladder problems. He has had a normal CT of the head and normal metabolic screening for treatable causes of his memory loss recently on a hospital visit he was found to have urinary tract infection.   We discussed urinary suppression, we will try vitamin C 1000 mg first. 
We had long counseling with patient and wife and her daughters, and he is to continue his Lexapro for anxiety and depression, and his wife's medications were adjusted. We also talked to the daughters, about family therapy. The patient has not had any new medical problems complications and or illnesses. He has a past history of memory loss, high blood pressure, anxiety and vasectomy. Past Medical History:  
Diagnosis Date  Anxiety disorder  Depression  Essential hypertension  Memory disorder  Neurological disorder Past Surgical History:  
Procedure Laterality Date [address] Family History Problem Relation Age of Onset  Dementia Mother  Heart Disease Father Social History Substance Use Topics  Smoking status: Never Smoker  Smokeless tobacco: Never Used  Alcohol use No  
   
Current Outpatient Prescriptions Medication Sig Dispense Refill  NAMZARIC 28-10 mg CSpX TAKE 1 CAPSULE BY MOUTH EVERY DAY 90 Cap 1  
 zonisamide (ZONEGRAN) 50 mg capsule Take 1 Cap by mouth daily. 100 Cap 11  
 carbidopa-levodopa (SINEMET)  mg per tablet Take 1 Tab by mouth three (3) times daily. Indications: Parkinsonism, Restless Legs Syndrome 270 Tab 3  
 sertraline (ZOLOFT) 100 mg tablet TAKE 1 TABLET BY MOUTH EVERY MORNING  5  
 ascorbic acid, vitamin C, (VITAMIN C) 500 mg tablet Take  by mouth two (2) times a day.  divalproex ER (DEPAKOTE ER) 500 mg ER tablet daily.  spironolactone (ALDACTONE) 25 mg tablet 25 mg two (2) times a day.  tamsulosin (FLOMAX) 0.4 mg capsule Take 0.4 mg by mouth daily.  vitamin e 400 unit tab Take  by mouth.  VIT C/TERELL AC/LUT/COPPER/ZNOX (PRESERVISION LUTEIN PO) Take  by mouth.  aspirin 81 mg tablet Take 81 mg by mouth.  multivitamins-minerals-lutein (CENTRUM SILVER) Tab Take  by mouth.  Cholecalciferol, Vitamin D3, (VITAMIN D3) 1,000 unit cap Take  by mouth. No Known Allergies Review of Systems: A comprehensive review of systems was negative except for: Neurological: positive for memory problems Objective: I 
 
 
NEUROLOGICAL EXAM: 
 
Appearance: The patient is well developed, well nourished, provides a incoherent history and is in no acute distress. Mental Status: Oriented to place and person not the president and not the date. Patient can not remember one of 3 words at 30 seconds with distraction. Difficulty naming his children initially. Patient still a little mentally slow and has difficulty with words and names at times. Patient has difficult time doing clock drawing, and has moderate recent memory loss. Mood and affect appropriate. Cranial Nerves:   Intact visual fields. Fundi are benign. JAQUELIN, EOM's full, no nystagmus, no ptosis. Facial sensation is normal. Corneal reflexes are not tested. Facial movement is symmetric, but patient has masklike facies. Hearing is abnormal bilaterally. Palate is midline with normal sternocleidomastoid and trapezius muscles are normal. Tongue is midline. Neck without meningismus or bruits Temporal arteries are not tender or enlarged Motor:  4/5 strength in upper and lower proximal and distal muscles. Normal bulk and increased tone eyes with cogwheel rigidity prominent in the upper extremities. . No fasciculations. Reflexes:   Deep tendon reflexes 2+/4 and symmetrical. 
No Babinski or clonus present Sensory:   Normal to touch, pinprick and vibration and DSS. Gait:  Abnormal gait as patient is unsteady when he initially gets up and starts to move and on turns, and patient needs maximum assistance of one person to ambulate. Tremor:    Mild bilateral static and action tremor noted. Cerebellar:  Abnormal Romberg and tandem cerebellar signs present. Neurovascular:  Normal heart sounds and regular rhythm, peripheral pulses decreased, and no carotid bruits. Assessment:  
 
 
 
Plan: With increasing weakness and difficulty walking, associated with parkinsonian-like features probably secondary to his neuroleptics of Zyprexa. He will continue Sinemet 25/100 mg one half pill 3 times a day one half pill 3 times a day He will start Zonegran 50 mg once a day and possibly advance slowly if tolerated for his movement disorder of Parkinson's His weight loss is probably related to the Zoloft, but he will continue that Moderate to severe dementia consistent with Alzheimer's disease Patient to continue Depakote 500 mg once daily which seems to be providing some control of agitation and confusion We advised him to watch his weight gain, and the family will monitor that. He is to continue Zoloft and his Zyprexa and Namzaric and neither melatonin nor Neurontin were tolerated He is to continue his multivitamins and vitamin D on a regular basis and remained mentally and physically active He is no longer driving or working at this time Patient condition discussed with family and 20 minutes spent counseling the family, about the disease, medications and prognosis, and they were advised to start planning for long-term care, get power of  and living Will We will see him again in 6 months, earlier if needed. He will call if any problems. Discussed his diagnosis, treatment options and the possibility of new medications in the future with the patient and his family Signed By: Marvin Burrell MD   
 April 5, 2018 This note will not be viewable in [address] If you have questions, please do not hesitate to call me. I look forward to following Mr. Hightower Sat along with you. Sincerely, Marvin Burrell MD

## 2018-04-05 NOTE — PATIENT INSTRUCTIONS
Office Policies       Phone calls/patient messages:   Please allow up to 24 hours for someone in the office to contact you about your call or message. Be mindful your provider may be out of the office or your message may require further review. We encourage you to use [employer] for your messages as this is a faster, more efficient way to communicate with our office         Medication Refills:   Prescription medications require 48 business hours to process. We encourage you to use [employer] for your refills. For controlled medications: Please allow 72 business hours to process. Certain medications may require you to  a written prescription at our office. NO narcotic/controlled medications will be prescribed after 4pm Monday through Friday or on weekends     Form/Paperwork Completion:   Please note there is a $25 fee for all paperwork completed by our providers. We ask that you allow 7-14 business days. Pre-payment is due prior to picking up/faxing the completed form. You may also download your forms to [employer] to have your doctor print off. A Healthy Lifestyle: Care Instructions  Your Care Instructions    A healthy lifestyle can help you feel good, stay at a healthy weight, and have plenty of energy for both work and play. A healthy lifestyle is something you can share with your whole family. A healthy lifestyle also can lower your risk for serious health problems, such as high blood pressure, heart disease, and diabetes. You can follow a few steps listed below to improve your health and the health of your family. Follow-up care is a key part of your treatment and safety. Be sure to make and go to all appointments, and call your doctor if you are having problems. It's also a good idea to know your test results and keep a list of the medicines you take. How can you care for yourself at home? · Do not eat too much sugar, fat, or fast foods. You can still have dessert and treats now and then.  The goal is moderation. · Start small to improve your eating habits. Pay attention to portion sizes, drink less juice and soda pop, and eat more fruits and vegetables. ¨ Eat a healthy amount of food. A 3-ounce serving of meat, for example, is about the size of a deck of cards. Fill the rest of your plate with vegetables and whole grains. ¨ Limit the amount of soda and sports drinks you have every day. Drink more water when you are thirsty. ¨ Eat at least 5 servings of fruits and vegetables every day. It may seem like a lot, but it is not hard to reach this goal. A serving or helping is 1 piece of fruit, 1 cup of vegetables, or 2 cups of leafy, raw vegetables. Have an apple or some carrot sticks as an afternoon snack instead of a candy bar. Try to have fruits and/or vegetables at every meal.  · Make exercise part of your daily routine. You may want to start with simple activities, such as walking, bicycling, or slow swimming. Try to be active 30 to 60 minutes every day. You do not need to do all 30 to 60 minutes all at once. For example, you can exercise 3 times a day for 10 or 20 minutes. Moderate exercise is safe for most people, but it is always a good idea to talk to your doctor before starting an exercise program.  · Keep moving. Felicia King the lawn, work in the garden, or EMUZE. Take the stairs instead of the elevator at work. · If you smoke, quit. People who smoke have an increased risk for heart attack, stroke, cancer, and other lung illnesses. Quitting is hard, but there are ways to boost your chance of quitting tobacco for good. ¨ Use nicotine gum, patches, or lozenges. ¨ Ask your doctor about stop-smoking programs and medicines. ¨ Keep trying. In addition to reducing your risk of diseases in the future, you will notice some benefits soon after you stop using tobacco. If you have shortness of breath or asthma symptoms, they will likely get better within a few weeks after you quit.   · Limit how much alcohol you drink. Moderate amounts of alcohol (up to 2 drinks a day for men, 1 drink a day for women) are okay. But drinking too much can lead to liver problems, high blood pressure, and other health problems. Family health  If you have a family, there are many things you can do together to improve your health. · Eat meals together as a family as often as possible. · Eat healthy foods. This includes fruits, vegetables, lean meats and dairy, and whole grains. · Include your family in your fitness plan. Most people think of activities such as jogging or tennis as the way to fitness, but there are many ways you and your family can be more active. Anything that makes you breathe hard and gets your heart pumping is exercise. Here are some tips:  ¨ Walk to do errands or to take your child to school or the bus. ¨ Go for a family bike ride after dinner instead of watching TV. Where can you learn more? Go to http://carolyn-warner.info/. Enter N164 in the search box to learn more about \"A Healthy Lifestyle: Care Instructions. \"  Current as of: May 12, 2017  Content Version: 11.4  © 6007-1332 [employer], [facility]. Care instructions adapted under license by [employer] (which disclaims liability or warranty for this information). If you have questions about a medical condition or this instruction, always ask your healthcare professional. [address] any warranty or liability for your use of this information.

## 2018-04-05 NOTE — MR AVS SNAPSHOT
[facility] 
[address], [address] [facility] 
[phone] Patient: Mackenzie Taylor MRN:  UL Visit Information Date & Time Provider Department Dept. Phone Encounter #  
 2018 12:00 PM Milton Hollingsworth MD [facility] [phone] [id number] Upcoming Health Maintenance Date Due Hepatitis C Screening 1945 DTaP/Tdap/Td series (1 - Tdap) 1968 FOBT Q 1 YEAR AGE 50-75 1997 ZOSTER VACCINE AGE 60> 2007 GLAUCOMA SCREENING Q2Y 2012 Pneumococcal 65+ Low/Medium Risk (1 of 2 - PCV13) 2012 Influenza Age 5 to Adult 2017 MEDICARE YEARLY EXAM 3/14/2018 Allergies as of 2018  Review Complete On: 2018 By: Dave Brought No Known Allergies Current Immunizations  Never Reviewed No immunizations on file. Not reviewed this visit You Were Diagnosed With   
  
 Codes Comments Parkinson's disease (tremor, stiffness, slow motion, unstable posture) ([facility] [address]    -  Primary ICD-10-CM: G20 
ICD-9-CM: 332.0 Dementia of the Alzheimer's type with late onset without behavioral disturbance     ICD-10-CM: G30.1, F02.80 ICD-9-CM: 331.0, 294.10 Vitals BP Pulse Temp Resp Height(growth percentile) Weight(growth percentile) 102/66 83 98 °F (36.7 °C) 16 5' 11\" (1.803 m) 229 lb (103.9 kg) SpO2 BMI Smoking Status 98% 31.94 kg/m2 Never Smoker Vitals History BMI and BSA Data Body Mass Index Body Surface Area 31.94 kg/m 2 2.28 m 2 Preferred Pharmacy Pharmacy Name Phone [facility] [address] [phone] Your Updated Medication List  
  
   
This list is accurate as of 18 12:43 PM.  Always use your most recent med list.  
  
  
  
  
 ascorbic acid (vitamin C) 500 mg tablet Commonly known as:  VITAMIN C  
 Take  by mouth two (2) times a day. aspirin 81 mg tablet Take 81 mg by mouth.  
  
 carbidopa-levodopa  mg per tablet Commonly known as:  SINEMET Take 1 Tab by mouth three (3) times daily. Indications: Parkinsonism, Restless Legs Syndrome CENTRUM SILVER Tab tablet Generic drug:  multivitamins-minerals-lutein Take  by mouth. divalproex  mg ER tablet Commonly known as:  DEPAKOTE ER  
daily. irbesartan 150 mg tablet Commonly known as:  AVAPRO TAKE 1 TABLET BY MOUTH EVERY DAY  
  
 memantine-donepezil 28-10 mg Cspx Commonly known as:  [facility] Take 1 Cap by mouth daily. PRESERVISION LUTEIN PO Take  by mouth. sertraline 100 mg tablet Commonly known as:  ZOLOFT  
TAKE 1 TABLET BY MOUTH EVERY MORNING  
  
 spironolactone 25 mg tablet Commonly known as:  ALDACTONE  
25 mg two (2) times a day. tamsulosin 0.4 mg capsule Commonly known as:  FLOMAX Take 0.4 mg by mouth daily. VITAMIN D3 1,000 unit Cap Generic drug:  cholecalciferol Take  by mouth.  
  
 vitamin e 400 unit Tab Take  by mouth. zonisamide 50 mg capsule Commonly known as:  Lyndall Mayito Take 1 Cap by mouth daily. Prescriptions Sent to Pharmacy Refills  
 zonisamide (ZONEGRAN) 50 mg capsule 11 Sig: Take 1 Cap by mouth daily. Class: Normal  
 Pharmacy: [facility] [id number] [address] Shirin Gallagher [place] Ph #: [phone] Route: Oral  
  
Patient Instructions Office Policies Phone calls/patient messages: Please allow up to 24 hours for someone in the office to contact you about your call or message. Be mindful your provider may be out of the office or your message may require further review. We encourage you to use [employer] for your messages as this is a faster, more efficient way to communicate with our office Medication Refills: 
 Prescription medications require 48 business hours to process.  We encourage you to use [employer] for your refills. For controlled medications: Please allow 72 business hours to process. Certain medications may require you to  a written prescription at our office. NO narcotic/controlled medications will be prescribed after 4pm Monday through Friday or on weekends Form/Paperwork Completion: 
 Please note there is a $25 fee for all paperwork completed by our providers. We ask that you allow 7-14 business days. Pre-payment is due prior to picking up/faxing the completed form. You may also download your forms to [employer] to have your doctor print off. A Healthy Lifestyle: Care Instructions Your Care Instructions A healthy lifestyle can help you feel good, stay at a healthy weight, and have plenty of energy for both work and play. A healthy lifestyle is something you can share with your whole family. A healthy lifestyle also can lower your risk for serious health problems, such as high blood pressure, heart disease, and diabetes. You can follow a few steps listed below to improve your health and the health of your family. Follow-up care is a key part of your treatment and safety. Be sure to make and go to all appointments, and call your doctor if you are having problems. It's also a good idea to know your test results and keep a list of the medicines you take. How can you care for yourself at home? · Do not eat too much sugar, fat, or fast foods. You can still have dessert and treats now and then. The goal is moderation. · Start small to improve your eating habits. Pay attention to portion sizes, drink less juice and soda pop, and eat more fruits and vegetables. ¨ Eat a healthy amount of food. A 3-ounce serving of meat, for example, is about the size of a deck of cards. Fill the rest of your plate with vegetables and whole grains. ¨ Limit the amount of soda and sports drinks you have every day. Drink more water when you are thirsty. ¨ Eat at least 5 servings of fruits and vegetables every day. It may seem like a lot, but it is not hard to reach this goal. A serving or helping is 1 piece of fruit, 1 cup of vegetables, or 2 cups of leafy, raw vegetables. Have an apple or some carrot sticks as an afternoon snack instead of a candy bar. Try to have fruits and/or vegetables at every meal. 
· Make exercise part of your daily routine. You may want to start with simple activities, such as walking, bicycling, or slow swimming. Try to be active 30 to 60 minutes every day. You do not need to do all 30 to 60 minutes all at once. For example, you can exercise 3 times a day for 10 or 20 minutes. Moderate exercise is safe for most people, but it is always a good idea to talk to your doctor before starting an exercise program. 
· Keep moving. Kapil Tranr the lawn, work in the garden, or [employer]. Take the stairs instead of the elevator at work. · If you smoke, quit. People who smoke have an increased risk for heart attack, stroke, cancer, and other lung illnesses. Quitting is hard, but there are ways to boost your chance of quitting tobacco for good. ¨ Use nicotine gum, patches, or lozenges. ¨ Ask your doctor about stop-smoking programs and medicines. ¨ Keep trying. In addition to reducing your risk of diseases in the future, you will notice some benefits soon after you stop using tobacco. If you have shortness of breath or asthma symptoms, they will likely get better within a few weeks after you quit. · Limit how much alcohol you drink. Moderate amounts of alcohol (up to 2 drinks a day for men, 1 drink a day for women) are okay. But drinking too much can lead to liver problems, high blood pressure, and other health problems. Family health If you have a family, there are many things you can do together to improve your health. · Eat meals together as a family as often as possible. · Eat healthy foods.  This includes fruits, vegetables, lean meats and dairy, and whole grains. · Include your family in your fitness plan. Most people think of activities such as jogging or tennis as the way to fitness, but there are many ways you and your family can be more active. Anything that makes you breathe hard and gets your heart pumping is exercise. Here are some tips: 
¨ Walk to do errands or to take your child to school or the bus. ¨ Go for a family bike ride after dinner instead of watching TV. Where can you learn more? Go to http://carolyn-warner.info/. Enter J099 in the search box to learn more about \"A Healthy Lifestyle: Care Instructions. \" Current as of: May 12, 2017 Content Version: 11.4 © 2308-2577 [employer]. Care instructions adapted under license by [employer] (which disclaims liability or warranty for this information). If you have questions about a medical condition or this instruction, always ask your healthcare professional. [address] any warranty or liability for your use of this information. Introducing [facility]! Kristina Su introduces [employer] patient portal. Now you can access parts of your medical record, email your doctor's office, and request medication refills online. 1. In your internet browser, go to [email or web address] 2. Click on the First Time User? Click Here link in the Sign In box. You will see the New Member Sign Up page. 3. Enter your [employer] Access Code exactly as it appears below. You will not need to use this code after youve completed the sign-up process. If you do not sign up before the expiration date, you must request a new code. · [employer] Access Code: [email or web address] Expires: 7/4/2018 12:03 PM 
 
4. Enter the last four digits of your Social Security Number (xxxx) and Date of Birth (mm/dd/yyyy) as indicated and click Submit. You will be taken to the next sign-up page. 5. Create a [employer] ID. This will be your [employer] login ID and cannot be changed, so think of one that is secure and easy to remember. 6. Create a [employer] password. You can change your password at any time. 7. Enter your Password Reset Question and Answer. This can be used at a later time if you forget your password. 8. Enter your e-mail address. You will receive e-mail notification when new information is available in [address] 9. Click Sign Up. You can now view and download portions of your medical record. 10. Click the Download Summary menu link to download a portable copy of your medical information. If you have questions, please visit the Frequently Asked Questions section of the [employer] website. Remember, [employer] is NOT to be used for urgent needs. For medical emergencies, dial 911. Now available from your iPhone and Android! Please provide this summary of care documentation to your next provider. Your primary care clinician is listed as Sommer Cr. If you have any questions after today's visit, please call [phone].

## 2018-04-06 NOTE — PROGRESS NOTES
Consult    Subjective:     Beatris Nunez is a 81 y. o.right-handed  male seen today for evaluation of new problem at the request of Dr. Carito Kate for evaluation of his medication of Sinemet which she is now taking 25/100 mg tablets 3 times a day but can only take a half a tablet each time because whole tablet makes him more confused agitated and restless. He is walking somewhat better with assistance, and moving a little bit better, but still seems fairly rigid with cogwheeling and bradykinesia. He is more spontaneous. He still looks like he needs more medication, and I am afraid to add a dopamine agonist because of his increased confusion on the Sinemet, and I do not think they can afford Azilect, so we will give him Zonegran 50 mg tablets to take once a day, and we may try to slowly advance him to 100 mg once a day thereafter. This has been reported to help with a movement disorder of Parkinson's. He is also seen for increasing weakness and lack of mobility and some increasing tremors more when he tries to do things and more difficulty walking which he cannot do without assistance now and increasing confusion and memory loss and progressive dementia and increasing unsteady gait. Patient seems to have developed parkinsonian symptoms, with increased generalized rigidity, slowness of movement, bradykinesia, cogwheel rigidity, but not much resting tremor but he does seem to have a little bit of an essential tremor or static tremor. He also has masklike facies. He is probably getting EPS  from his Zyprexa. Less likely has primary Parkinson's disease. He does not actively hallucinating all that much. He has had no new focal weakness, but just generally weak all over. He seems to be much more rigid and generally slow and having difficulty moving and having a masslike facies. Is really not on any neuroleptics.   Patient cannot use a cane or walker successfully because he does not have the cognitive ability to handle them. Patient cognition continues to deteriorate rather fast, the family is trying to handle him at home, and does have sitters in the house 7 days a week now for 24 hours a day. They give the patient his medication and his wife to her medication, arrange the meals, and try to get him to exercise some. The family does not want to place him in a nursing home and the patient themselves absolutely refuse to go to assisted living. He is on Depakote 500 mg once a day by his psychiatrist, and patient now on Zoloft 100 mg a day, with some recent weight loss, and also taking them Namzaric  28 mg once a day, and on Zyprexa 5 mg a day. Neurontin seemed to make him worse that was discontinued. He is no longer driving. He has had progression of disease and no longer goes to work and his  watches him closely. Family has arranged a power of , living will and are considering long-term management. We discussed his condition with his family and office visit was 40 minutes today, 20 minutes of which was counseling the family. We tried a home health evaluation and therapy, but neither he nor his wife tolerated strangers in the house very well. The family notices that he is having more difficulty finishing sentences and finishing conversation. He has had no other new focal weakness sensory loss visual changes or gait problems or bowel or bladder problems. He has had a normal CT of the head and normal metabolic screening for treatable causes of his memory loss recently on a hospital visit he was found to have urinary tract infection. We discussed urinary suppression, we will try vitamin C 1000 mg first.  We had long counseling with patient and wife and her daughters, and he is to continue his Lexapro for anxiety and depression, and his wife's medications were adjusted. We also talked to the daughters, about family therapy.     The patient has not had any new medical problems complications and or illnesses. He has a past history of memory loss, high blood pressure, anxiety and vasectomy. Past Medical History:   Diagnosis Date    Anxiety disorder     Depression     Essential hypertension     Memory disorder     Neurological disorder       Past Surgical History:   Procedure Laterality Date    HX VASECTOMY  67     Family History   Problem Relation Age of Onset    Dementia Mother     Heart Disease Father       Social History   Substance Use Topics    Smoking status: Never Smoker    Smokeless tobacco: Never Used    Alcohol use No       Current Outpatient Prescriptions   Medication Sig Dispense Refill    NAMZARIC 28-10 mg CSpX TAKE 1 CAPSULE BY MOUTH EVERY DAY 90 Cap 1    zonisamide (ZONEGRAN) 50 mg capsule Take 1 Cap by mouth daily. 100 Cap 11    carbidopa-levodopa (SINEMET)  mg per tablet Take 1 Tab by mouth three (3) times daily. Indications: Parkinsonism, Restless Legs Syndrome 270 Tab 3    sertraline (ZOLOFT) 100 mg tablet TAKE 1 TABLET BY MOUTH EVERY MORNING  5    ascorbic acid, vitamin C, (VITAMIN C) 500 mg tablet Take  by mouth two (2) times a day.  divalproex ER (DEPAKOTE ER) 500 mg ER tablet daily.  spironolactone (ALDACTONE) 25 mg tablet 25 mg two (2) times a day.  tamsulosin (FLOMAX) 0.4 mg capsule Take 0.4 mg by mouth daily.  vitamin e 400 unit tab Take  by mouth.  VIT C/TERELL AC/LUT/COPPER/ZNOX (PRESERVISION LUTEIN PO) Take  by mouth.  aspirin 81 mg tablet Take 81 mg by mouth.  multivitamins-minerals-lutein (CENTRUM SILVER) Tab Take  by mouth.  Cholecalciferol, Vitamin D3, (VITAMIN D3) 1,000 unit cap Take  by mouth. No Known Allergies     Review of Systems:  A comprehensive review of systems was negative except for: Neurological: positive for memory problems     Objective:     I      NEUROLOGICAL EXAM:    Appearance:   The patient is well developed, well nourished, provides a incoherent history and is in no acute distress. Mental Status: Oriented to place and person not the president and not the date. Patient can not remember one of 3 words at 30 seconds with distraction. Difficulty naming his children initially. Patient still a little mentally slow and has difficulty with words and names at times. Patient has difficult time doing clock drawing, and has moderate recent memory loss. Mood and affect appropriate. Cranial Nerves:   Intact visual fields. Fundi are benign. JAQUELIN, EOM's full, no nystagmus, no ptosis. Facial sensation is normal. Corneal reflexes are not tested. Facial movement is symmetric, but patient has masklike facies. Hearing is abnormal bilaterally. Palate is midline with normal sternocleidomastoid and trapezius muscles are normal. Tongue is midline. Neck without meningismus or bruits  Temporal arteries are not tender or enlarged   Motor:  4/5 strength in upper and lower proximal and distal muscles. Normal bulk and increased tone eyes with cogwheel rigidity prominent in the upper extremities. . No fasciculations. Reflexes:   Deep tendon reflexes 2+/4 and symmetrical.  No Babinski or clonus present   Sensory:   Normal to touch, pinprick and vibration and DSS. Gait:  Abnormal gait as patient is unsteady when he initially gets up and starts to move and on turns, and patient needs maximum assistance of one person to ambulate. Tremor:    Mild bilateral static and action tremor noted. Cerebellar:  Abnormal Romberg and tandem cerebellar signs present. Neurovascular:  Normal heart sounds and regular rhythm, peripheral pulses decreased, and no carotid bruits. Assessment:         Plan: With increasing weakness and difficulty walking, associated with parkinsonian-like features probably secondary to his neuroleptics of Zyprexa.   He will continue Sinemet 25/100 mg one half pill 3 times a day one half pill 3 times a day  He will start Zonegran 50 mg once a day and possibly advance slowly if tolerated for his movement disorder of Parkinson's  His weight loss is probably related to the Zoloft, but he will continue that  Moderate to severe dementia consistent with Alzheimer's disease  Patient to continue Depakote 500 mg once daily which seems to be providing some control of agitation and confusion  We advised him to watch his weight gain, and the family will monitor that. He is to continue Zoloft and his Zyprexa and Namzaric and neither melatonin nor Neurontin were tolerated  He is to continue his multivitamins and vitamin D on a regular basis and remained mentally and physically active  He is no longer driving or working at this time  Patient condition discussed with family and 20 minutes spent counseling the family, about the disease, medications and prognosis, and they were advised to start planning for long-term care, get power of  and living Will  We will see him again in 6 months, earlier if needed. He will call if any problems. Discussed his diagnosis, treatment options and the possibility of new medications in the future with the patient and his family     Signed By: Cristine Richards MD     April 5, 2018       This note will not be viewable in [address]

## 2018-07-05 ENCOUNTER — OFFICE VISIT (OUTPATIENT)
Dept: NEUROLOGY | Age: 73
End: 2018-07-05

## 2018-07-05 VITALS — HEART RATE: 73 BPM | OXYGEN SATURATION: 98 % | SYSTOLIC BLOOD PRESSURE: 120 MMHG | DIASTOLIC BLOOD PRESSURE: 72 MMHG

## 2018-07-05 DIAGNOSIS — G30.1 DEMENTIA OF THE ALZHEIMER'S TYPE WITH LATE ONSET WITHOUT BEHAVIORAL DISTURBANCE (HCC): Primary | ICD-10-CM

## 2018-07-05 DIAGNOSIS — F02.80 DEMENTIA OF THE ALZHEIMER'S TYPE WITH LATE ONSET WITHOUT BEHAVIORAL DISTURBANCE (HCC): Primary | ICD-10-CM

## 2018-07-05 DIAGNOSIS — G20 PARKINSON'S DISEASE (TREMOR, STIFFNESS, SLOW MOTION, UNSTABLE POSTURE) (HCC): ICD-10-CM

## 2018-07-05 RX ORDER — IRBESARTAN 150 MG/1
150 TABLET ORAL
COMMUNITY
End: 2019-09-20 | Stop reason: ALTCHOICE

## 2018-07-05 RX ORDER — ZONISAMIDE 100 MG/1
100 CAPSULE ORAL DAILY
Qty: 90 CAP | Refills: 5 | Status: SHIPPED | OUTPATIENT
Start: 2018-07-05 | End: 2019-02-22 | Stop reason: SDUPTHER

## 2018-07-05 NOTE — MR AVS SNAPSHOT
[address], 
[address], [address] [facility] 
[phone] Patient: Cyrus Camargo MRN:  [id number] Visit Information Date & Time Provider Department Dept. Phone Encounter #  
 7/5/2018 11:40 AM Padilla Hernandez MD Neurology [facility] at [facility] [phone] [id number] Follow-up Instructions Return in about 6 months (around 1/5/2019). Upcoming Health Maintenance Date Due Hepatitis C Screening 1945 DTaP/Tdap/Td series (1 - Tdap) 8/30/1968 FOBT Q 1 YEAR AGE 50-75 8/30/1997 ZOSTER VACCINE AGE 60> 6/30/2007 GLAUCOMA SCREENING Q2Y 8/30/2012 Pneumococcal 65+ Low/Medium Risk (1 of 2 - PCV13) 8/30/2012 MEDICARE YEARLY EXAM 3/14/2018 Influenza Age 5 to Adult 8/1/2018 Allergies as of 7/5/2018  Review Complete On: 7/5/2018 By: Padilla Hernandez MD  
 No Known Allergies Current Immunizations  Never Reviewed No immunizations on file. Not reviewed this visit You Were Diagnosed With   
  
 Codes Comments Dementia of the Alzheimer's type with late onset without behavioral disturbance    -  Primary ICD-10-CM: G30.1, F02.80 ICD-9-CM: 331.0, 294.10 Parkinson's disease (tremor, stiffness, slow motion, unstable posture) ([facility]     ICD-10-CM: G20 
ICD-9-CM: 332.0 Vitals BP Pulse SpO2 Smoking Status 120/72 73 98% Never Smoker Preferred Pharmacy Pharmacy Name Phone [facility] [facility] [phone] Your Updated Medication List  
  
   
This list is accurate as of 7/5/18 12:35 PM.  Always use your most recent med list.  
  
  
  
  
 ascorbic acid (vitamin C) 500 mg tablet Commonly known as:  VITAMIN C Take  by mouth two (2) times a day. aspirin 81 mg tablet Take 81 mg by mouth. AVAPRO 150 mg tablet Generic drug:  irbesartan Take 150 mg by mouth nightly. carbidopa-levodopa  mg per tablet Commonly known as:  SINEMET Take 1 Tab by mouth three (3) times daily. Indications: Parkinsonism, Restless Legs Syndrome CENTRUM SILVER Tab tablet Generic drug:  multivitamins-minerals-lutein Take  by mouth. divalproex  mg ER tablet Commonly known as:  DEPAKOTE ER  
daily. NAMZARIC 28-10 mg Cspx Generic drug:  memantine-donepezil TAKE 1 CAPSULE BY MOUTH EVERY DAY  
  
 PRESERVISION LUTEIN PO Take  by mouth. sertraline 100 mg tablet Commonly known as:  ZOLOFT  
TAKE 1 TABLET BY MOUTH EVERY MORNING  
  
 spironolactone 25 mg tablet Commonly known as:  ALDACTONE  
25 mg two (2) times a day. tamsulosin 0.4 mg capsule Commonly known as:  FLOMAX Take 0.4 mg by mouth daily. VITAMIN D3 1,000 unit Cap Generic drug:  cholecalciferol Take  by mouth.  
  
 vitamin e 400 unit Tab Take  by mouth. zonisamide 100 mg capsule Commonly known as:  Arlester Kar Take 1 Cap by mouth daily. Prescriptions Sent to Pharmacy Refills  
 zonisamide (ZONEGRAN) 100 mg capsule 5 Sig: Take 1 Cap by mouth daily. Class: Normal  
 Pharmacy: [facility] [id number] [facility], [address] [place] [address] Président Nickolas Ph #: [phone] Route: Oral  
  
Follow-up Instructions Return in about 6 months (around 1/5/2019). Patient Instructions Office Policies Phone calls/patient messages: · Please allow up to 24 hours for someone in the office to contact you about your call or message. Be mindful your provider may be out of the office or your message may require further review. We encourage you to use Flickr for your messages as this is a faster, more efficient way to communicate with our office Medication Refills: · Prescription medications require up to 48 business hours to process. We encourage you to use Flickr for your refills. · For controlled medications: Please allow up to 72 business hours to process. Certain medications may require you to  a written prescription at our office. · NO narcotic/controlled medications will be prescribed after 4pm Monday through Friday or on weekends Form/Paperwork Completion: · Please note there is a $25 fee for all paperwork completed by our providers. We ask that you allow 7-14 business days. Pre-payment is due prior to picking up/faxing the completed form. You may also download your forms to [employer] to have your doctor print off. A Healthy Lifestyle: Care Instructions Your Care Instructions A healthy lifestyle can help you feel good, stay at a healthy weight, and have plenty of energy for both work and play. A healthy lifestyle is something you can share with your whole family. A healthy lifestyle also can lower your risk for serious health problems, such as high blood pressure, heart disease, and diabetes. You can follow a few steps listed below to improve your health and the health of your family. Follow-up care is a key part of your treatment and safety. Be sure to make and go to all appointments, and call your doctor if you are having problems. It's also a good idea to know your test results and keep a list of the medicines you take. How can you care for yourself at home? · Do not eat too much sugar, fat, or fast foods. You can still have dessert and treats now and then. The goal is moderation. · Start small to improve your eating habits. Pay attention to portion sizes, drink less juice and soda pop, and eat more fruits and vegetables. ¨ Eat a healthy amount of food. A 3-ounce serving of meat, for example, is about the size of a deck of cards. Fill the rest of your plate with vegetables and whole grains. ¨ Limit the amount of soda and sports drinks you have every day. Drink more water when you are thirsty. ¨ Eat at least 5 servings of fruits and vegetables every day. It may seem like a lot, but it is not hard to reach this goal. A serving or helping is 1 piece of fruit, 1 cup of vegetables, or 2 cups of leafy, raw vegetables. Have an apple or some carrot sticks as an afternoon snack instead of a candy bar. Try to have fruits and/or vegetables at every meal. 
· Make exercise part of your daily routine. You may want to start with simple activities, such as walking, bicycling, or slow swimming. Try to be active 30 to 60 minutes every day. You do not need to do all 30 to 60 minutes all at once. For example, you can exercise 3 times a day for 10 or 20 minutes. Moderate exercise is safe for most people, but it is always a good idea to talk to your doctor before starting an exercise program. 
· Keep moving. Le Isabela the lawn, work in the garden, or [employer]. Take the stairs instead of the elevator at work. · If you smoke, quit. People who smoke have an increased risk for heart attack, stroke, cancer, and other lung illnesses. Quitting is hard, but there are ways to boost your chance of quitting tobacco for good. ¨ Use nicotine gum, patches, or lozenges. ¨ Ask your doctor about stop-smoking programs and medicines. ¨ Keep trying. In addition to reducing your risk of diseases in the future, you will notice some benefits soon after you stop using tobacco. If you have shortness of breath or asthma symptoms, they will likely get better within a few weeks after you quit. · Limit how much alcohol you drink. Moderate amounts of alcohol (up to 2 drinks a day for men, 1 drink a day for women) are okay. But drinking too much can lead to liver problems, high blood pressure, and other health problems. Family health If you have a family, there are many things you can do together to improve your health. · Eat meals together as a family as often as possible. · Eat healthy foods.  This includes fruits, vegetables, lean meats and dairy, and whole grains. · Include your family in your fitness plan. Most people think of activities such as jogging or tennis as the way to fitness, but there are many ways you and your family can be more active. Anything that makes you breathe hard and gets your heart pumping is exercise. Here are some tips: 
¨ Walk to do errands or to take your child to school or the bus. ¨ Go for a family bike ride after dinner instead of watching TV. Where can you learn more? Go to http://carolyn-warner.info/. Enter M447 in the search box to learn more about \"A Healthy Lifestyle: Care Instructions. \" Current as of: May 12, 2017 Content Version: 11.4 © 3926-6225 [employer]. Care instructions adapted under license by [employer] (which disclaims liability or warranty for this information). If you have questions about a medical condition or this instruction, always ask your healthcare professional. [place] [address] any warranty or liability for your use of this information. Introducing [facility]! Casandra Floyd introduces Trapster patient portal. Now you can access parts of your medical record, email your doctor's office, and request medication refills online. 1. In your internet browser, go to [email or web address] 2. Click on the First Time User? Click Here link in the Sign In box. You will see the New Member Sign Up page. 3. Enter your Trapster Access Code exactly as it appears below. You will not need to use this code after youve completed the sign-up process. If you do not sign up before the expiration date, you must request a new code. · Trapster Access Code: [email or web address] Expires: 10/3/2018 12:35 PM 
 
4. Enter the last four digits of your Social Security Number (xxxx) and Date of Birth (mm/dd/yyyy) as indicated and click Submit. You will be taken to the next sign-up page. 5. Create a iSSimple ID. This will be your iSSimple login ID and cannot be changed, so think of one that is secure and easy to remember. 6. Create a iSSimple password. You can change your password at any time. 7. Enter your Password Reset Question and Answer. This can be used at a later time if you forget your password. 8. Enter your e-mail address. You will receive e-mail notification when new information is available in [address] 9. Click Sign Up. You can now view and download portions of your medical record. 10. Click the Download Summary menu link to download a portable copy of your medical information. If you have questions, please visit the Frequently Asked Questions section of the iSSimple website. Remember, iSSimple is NOT to be used for urgent needs. For medical emergencies, dial 911. Now available from your iPhone and Android! Please provide this summary of care documentation to your next provider. Your primary care clinician is listed as Steve Razo. If you have any questions after today's visit, please call [phone].

## 2018-07-05 NOTE — LETTER
7/5/2018 8:58 PM 
 
Patient:  Red Roldan YOB: 1945 Date of Visit: 7/5/2018 Dear No Recipients: Thank you for referring Mr. Christiana Esparza to me for evaluation/treatment. Below are the relevant portions of my assessment and plan of care. Consult Subjective:  
 
Red Roldan is a 81 y. o.right-handed  male seen today for evaluation at the request of Dr. César Torres of new problem increasing difficulty with behavior, sometimes getting agitated and striking out at his caregivers. Patient on namzaric 28 mg a day, and on zonisamide 50 mg a day for his Parkinson-like syndrome from his Zyprexa which he is now off of now. He is also taking Sinemet 3 times a day, dose of one half tablet 25/100 mg 3 times a day. He is moving much better according to the family after discontinuing his Zyprexa and taking the Sinemet. We will try to increase the Zonegran 200 mg a day to see if that helps with the behavior and his parkinsonian symptoms. Next treatment might be increasing his Depakote. He is going to see a psychiatrist in the near future. At the request of Dr. César Torres for evaluation of his medication of Sinemet which she is now taking 25/100 mg tablets 3 times a day but can only take a half a tablet each time because whole tablet makes him more confused agitated and restless. He is walking somewhat better with assistance, and moving a little bit better, but still seems fairly rigid with cogwheeling and bradykinesia. He is more spontaneous. He still looks like he needs more medication, and I am afraid to add a dopamine agonist because of his increased confusion on the Sinemet, and I do not think they can afford Azilect, so we will give him Zonegran 50 mg tablets to take once a day, and we may try to slowly advance him to 100 mg once a day thereafter. This has been reported to help with a movement disorder of Parkinson's. He is also seen for increasing weakness and lack of mobility and some increasing tremors more when he tries to do things and more difficulty walking which he cannot do without assistance now and increasing confusion and memory loss and progressive dementia and increasing unsteady gait. Patient seems to have developed parkinsonian symptoms, with increased generalized rigidity, slowness of movement, bradykinesia, cogwheel rigidity, but not much resting tremor but he does seem to have a little bit of an essential tremor or static tremor. He also has masklike facies. He is probably getting EPS  from his Zyprexa. Less likely has primary Parkinson's disease. He does not actively hallucinating all that much. He has had no new focal weakness, but just generally weak all over. He seems to be much more rigid and generally slow and having difficulty moving and having a masslike facies. Is really not on any neuroleptics. Patient cannot use a cane or walker successfully because he does not have the cognitive ability to handle them. Patient cognition continues to deteriorate rather fast, the family is trying to handle him at home, and does have sitters in the house 7 days a week now for 24 hours a day. They give the patient his medication and his wife to her medication, arrange the meals, and try to get him to exercise some. The family does not want to place him in a nursing home and the patient themselves absolutely refuse to go to assisted living. He is on Depakote 500 mg once a day by his psychiatrist, and patient now on Zoloft 100 mg a day, with some recent weight loss, and also taking them Namzaric  28 mg once a day, and on Zyprexa 5 mg a day. Neurontin seemed to make him worse that was discontinued. He is no longer driving. He has had progression of disease and no longer goes to work and his  watches him closely.   Family has arranged a power of , living will and are considering long-term management. We discussed his condition with his family and office visit was 40 minutes today, 20 minutes of which was counseling the family. We tried a home health evaluation and therapy, but neither he nor his wife tolerated strangers in the house very well. The family notices that he is having more difficulty finishing sentences and finishing conversation. He has had no other new focal weakness sensory loss visual changes or gait problems or bowel or bladder problems. He has had a normal CT of the head and normal metabolic screening for treatable causes of his memory loss recently on a hospital visit he was found to have urinary tract infection. We discussed urinary suppression, we will try vitamin C 1000 mg first. 
We had long counseling with patient and wife and her daughters, and he is to continue his Lexapro for anxiety and depression, and his wife's medications were adjusted. We also talked to the daughters, about family therapy. The patient has not had any new medical problems complications and or illnesses. He has a past history of memory loss, high blood pressure, anxiety and vasectomy. Past Medical History:  
Diagnosis Date  Anxiety disorder  Depression  Essential hypertension  Memory disorder  Neurological disorder Past Surgical History:  
Procedure Laterality Date [address] Family History Problem Relation Age of Onset  Dementia Mother  Heart Disease Father Social History Substance Use Topics  Smoking status: Never Smoker  Smokeless tobacco: Never Used  Alcohol use No  
   
Current Outpatient Prescriptions Medication Sig Dispense Refill  irbesartan (AVAPRO) 150 mg tablet Take 150 mg by mouth nightly.  zonisamide (ZONEGRAN) 100 mg capsule Take 1 Cap by mouth daily.  90 Cap 5  
 NAMZARIC 28-10 mg CSpX TAKE 1 CAPSULE BY MOUTH EVERY DAY 90 Cap 1  
  carbidopa-levodopa (SINEMET)  mg per tablet Take 1 Tab by mouth three (3) times daily. Indications: Parkinsonism, Restless Legs Syndrome 270 Tab 3  
 sertraline (ZOLOFT) 100 mg tablet TAKE 1 TABLET BY MOUTH EVERY MORNING  5  
 ascorbic acid, vitamin C, (VITAMIN C) 500 mg tablet Take  by mouth two (2) times a day.  divalproex ER (DEPAKOTE ER) 500 mg ER tablet daily.  spironolactone (ALDACTONE) 25 mg tablet 25 mg two (2) times a day.  tamsulosin (FLOMAX) 0.4 mg capsule Take 0.4 mg by mouth daily.  vitamin e 400 unit tab Take  by mouth.  VIT C/TERELL AC/LUT/COPPER/ZNOX (PRESERVISION LUTEIN PO) Take  by mouth.  aspirin 81 mg tablet Take 81 mg by mouth.  multivitamins-minerals-lutein (CENTRUM SILVER) Tab Take  by mouth.  Cholecalciferol, Vitamin D3, (VITAMIN D3) 1,000 unit cap Take  by mouth. No Known Allergies Review of Systems: A comprehensive review of systems was negative except for: Neurological: positive for memory problems Objective: I 
 
 
NEUROLOGICAL EXAM: 
 
Appearance: The patient is well developed, well nourished, provides a incoherent history and is in no acute distress. Mental Status: Oriented to place and person not the president and not the date. Patient can not remember one of 3 words at 30 seconds with distraction. Difficulty naming his children initially. Patient still a little mentally slow and has difficulty with words and names at times. Patient has difficult time doing clock drawing, and has moderate recent memory loss. Mood and affect appropriate. Cranial Nerves:   Intact visual fields. Fundi are benign. JAQUELIN, EOM's full, no nystagmus, no ptosis. Facial sensation is normal. Corneal reflexes are not tested. Facial movement is symmetric, but patient has masklike facies. Hearing is abnormal bilaterally. Palate is midline with normal sternocleidomastoid and trapezius muscles are normal. Tongue is midline. Neck without meningismus or bruits Temporal arteries are not tender or enlarged Motor:  4/5 strength in upper and lower proximal and distal muscles. Normal bulk and increased tone eyes with cogwheel rigidity prominent in the upper extremities. . No fasciculations. Reflexes:   Deep tendon reflexes 2+/4 and symmetrical. 
No Babinski or clonus present Sensory:   Normal to touch, pinprick and vibration and DSS. Gait:  Abnormal gait as patient is unsteady when he initially gets up and starts to move and on turns, and patient needs maximum assistance of one person to ambulate. Tremor:    Mild bilateral static and action tremor noted. Cerebellar:  Abnormal Romberg and tandem cerebellar signs present. Neurovascular:  Normal heart sounds and regular rhythm, peripheral pulses decreased, and no carotid bruits. Assessment:  
 
 
 
Plan:  
 
Patient with increasing agitated behavior, and we will try increasing his zonisamide for his Parkinson's disease and behavior and see how he does on that. With increasing weakness and difficulty walking, associated with parkinsonian-like features probably secondary to his neuroleptics of Zyprexa which she is currently off of. He will continue Sinemet 25/100 mg one half pill 3 times a day one half pill 3 times a day We may need to increase his Depakote in the near future for behavior problems He will start Zonegran 100 mg once a day and possibly advance slowly if tolerated for his movement disorder of Parkinson's His weight loss is probably related to the Zoloft, but he will continue that Moderate to severe dementia consistent with Alzheimer's disease Patient to continue Depakote 500 mg once daily which seems to be providing some control of agitation and confusion We advised him to watch his weight gain, and the family will monitor that. He is to continue Lexapro and Namzaric and neither melatonin nor Neurontin were tolerated He is to continue his multivitamins and vitamin D on a regular basis and remained mentally and physically active He is no longer driving or working at this time Patient condition discussed with family and 20 minutes spent counseling the family, about the disease, medications and prognosis, and they were advised to start planning for long-term care, get power of  and living Will We will see him again in 6 months, earlier if needed. He will call if any problems. Discussed his diagnosis, treatment options and the possibility of new medications in the future with the patient and his family Signed By: Bekah Sosa MD   
 July 5, 2018 This note will not be viewable in [address] If you have questions, please do not hesitate to call me. I look forward to following Mr. Ilan Albrecht along with you. Sincerely, Bekah Sosa MD

## 2018-07-05 NOTE — PATIENT INSTRUCTIONS
Office Policies        Phone calls/patient messages:    · Please allow up to 24 hours for someone in the office to contact you about your call or message. Be mindful your provider may be out of the office or your message may require further review. We encourage you to use [facility] for your messages as this is a faster, more efficient way to communicate with our office           Medication Refills:    · Prescription medications require up to 48 business hours to process. We encourage you to use [facility] for your refills. · For controlled medications: Please allow up to 72 business hours to process. Certain medications may require you to  a written prescription at our office. · NO narcotic/controlled medications will be prescribed after 4pm Monday through Friday or on weekends              Form/Paperwork Completion:    · Please note there is a $25 fee for all paperwork completed by our providers. We ask that you allow 7-14 business days. Pre-payment is due prior to picking up/faxing the completed form. You may also download your forms to [facility] to have your doctor print off. A Healthy Lifestyle: Care Instructions  Your Care Instructions    A healthy lifestyle can help you feel good, stay at a healthy weight, and have plenty of energy for both work and play. A healthy lifestyle is something you can share with your whole family. A healthy lifestyle also can lower your risk for serious health problems, such as high blood pressure, heart disease, and diabetes. You can follow a few steps listed below to improve your health and the health of your family. Follow-up care is a key part of your treatment and safety. Be sure to make and go to all appointments, and call your doctor if you are having problems. It's also a good idea to know your test results and keep a list of the medicines you take. How can you care for yourself at home? · Do not eat too much sugar, fat, or fast foods.  You can still have dessert and treats now and then. The goal is moderation. · Start small to improve your eating habits. Pay attention to portion sizes, drink less juice and soda pop, and eat more fruits and vegetables. ¨ Eat a healthy amount of food. A 3-ounce serving of meat, for example, is about the size of a deck of cards. Fill the rest of your plate with vegetables and whole grains. ¨ Limit the amount of soda and sports drinks you have every day. Drink more water when you are thirsty. ¨ Eat at least 5 servings of fruits and vegetables every day. It may seem like a lot, but it is not hard to reach this goal. A serving or helping is 1 piece of fruit, 1 cup of vegetables, or 2 cups of leafy, raw vegetables. Have an apple or some carrot sticks as an afternoon snack instead of a candy bar. Try to have fruits and/or vegetables at every meal.  · Make exercise part of your daily routine. You may want to start with simple activities, such as walking, bicycling, or slow swimming. Try to be active 30 to 60 minutes every day. You do not need to do all 30 to 60 minutes all at once. For example, you can exercise 3 times a day for 10 or 20 minutes. Moderate exercise is safe for most people, but it is always a good idea to talk to your doctor before starting an exercise program.  · Keep moving. Lizbeth Parody the lawn, work in the garden, or [employer]. Take the stairs instead of the elevator at work. · If you smoke, quit. People who smoke have an increased risk for heart attack, stroke, cancer, and other lung illnesses. Quitting is hard, but there are ways to boost your chance of quitting tobacco for good. ¨ Use nicotine gum, patches, or lozenges. ¨ Ask your doctor about stop-smoking programs and medicines. ¨ Keep trying.   In addition to reducing your risk of diseases in the future, you will notice some benefits soon after you stop using tobacco. If you have shortness of breath or asthma symptoms, they will likely get better within a few weeks after you quit. · Limit how much alcohol you drink. Moderate amounts of alcohol (up to 2 drinks a day for men, 1 drink a day for women) are okay. But drinking too much can lead to liver problems, high blood pressure, and other health problems. Family health  If you have a family, there are many things you can do together to improve your health. · Eat meals together as a family as often as possible. · Eat healthy foods. This includes fruits, vegetables, lean meats and dairy, and whole grains. · Include your family in your fitness plan. Most people think of activities such as jogging or tennis as the way to fitness, but there are many ways you and your family can be more active. Anything that makes you breathe hard and gets your heart pumping is exercise. Here are some tips:  ¨ Walk to do errands or to take your child to school or the bus. ¨ Go for a family bike ride after dinner instead of watching TV. Where can you learn more? Go to http://carolyn-warner.info/. Enter R036 in the search box to learn more about \"A Healthy Lifestyle: Care Instructions. \"  Current as of: May 12, 2017  Content Version: 11.4  © 5316-8660 [employer], [employer]. Care instructions adapted under license by [employer] (which disclaims liability or warranty for this information). If you have questions about a medical condition or this instruction, always ask your healthcare professional. [place] [address] any warranty or liability for your use of this information.

## 2018-07-06 NOTE — PROGRESS NOTES
Consult    Subjective:     Donn Lopez is a 81 y. o.right-handed  male seen today for evaluation at the request of Dr. Lino Morse of new problem increasing difficulty with behavior, sometimes getting agitated and striking out at his caregivers. Patient on namzaric 28 mg a day, and on zonisamide 50 mg a day for his Parkinson-like syndrome from his Zyprexa which he is now off of now. He is also taking Sinemet 3 times a day, dose of one half tablet 25/100 mg 3 times a day. He is moving much better according to the family after discontinuing his Zyprexa and taking the Sinemet. We will try to increase the Zonegran 200 mg a day to see if that helps with the behavior and his parkinsonian symptoms. Next treatment might be increasing his Depakote. He is going to see a psychiatrist in the near future. At the request of Dr. Lino Morse for evaluation of his medication of Sinemet which she is now taking 25/100 mg tablets 3 times a day but can only take a half a tablet each time because whole tablet makes him more confused agitated and restless. He is walking somewhat better with assistance, and moving a little bit better, but still seems fairly rigid with cogwheeling and bradykinesia. He is more spontaneous. He still looks like he needs more medication, and I am afraid to add a dopamine agonist because of his increased confusion on the Sinemet, and I do not think they can afford Azilect, so we will give him Zonegran 50 mg tablets to take once a day, and we may try to slowly advance him to 100 mg once a day thereafter. This has been reported to help with a movement disorder of Parkinson's. He is also seen for increasing weakness and lack of mobility and some increasing tremors more when he tries to do things and more difficulty walking which he cannot do without assistance now and increasing confusion and memory loss and progressive dementia and increasing unsteady gait.   Patient seems to have developed parkinsonian symptoms, with increased generalized rigidity, slowness of movement, bradykinesia, cogwheel rigidity, but not much resting tremor but he does seem to have a little bit of an essential tremor or static tremor. He also has masklike facies. He is probably getting EPS  from his Zyprexa. Less likely has primary Parkinson's disease. He does not actively hallucinating all that much. He has had no new focal weakness, but just generally weak all over. He seems to be much more rigid and generally slow and having difficulty moving and having a masslike facies. Is really not on any neuroleptics. Patient cannot use a cane or walker successfully because he does not have the cognitive ability to handle them. Patient cognition continues to deteriorate rather fast, the family is trying to handle him at home, and does have sitters in the house 7 days a week now for 24 hours a day. They give the patient his medication and his wife to her medication, arrange the meals, and try to get him to exercise some. The family does not want to place him in a nursing home and the patient themselves absolutely refuse to go to assisted living. He is on Depakote 500 mg once a day by his psychiatrist, and patient now on Zoloft 100 mg a day, with some recent weight loss, and also taking them Namzaric  28 mg once a day, and on Zyprexa 5 mg a day. Neurontin seemed to make him worse that was discontinued. He is no longer driving. He has had progression of disease and no longer goes to work and his  watches him closely. Family has arranged a power of , living will and are considering long-term management. We discussed his condition with his family and office visit was 40 minutes today, 20 minutes of which was counseling the family. We tried a home health evaluation and therapy, but neither he nor his wife tolerated strangers in the house very well.   The family notices that he is having more difficulty finishing sentences and finishing conversation. He has had no other new focal weakness sensory loss visual changes or gait problems or bowel or bladder problems. He has had a normal CT of the head and normal metabolic screening for treatable causes of his memory loss recently on a hospital visit he was found to have urinary tract infection. We discussed urinary suppression, we will try vitamin C 1000 mg first.  We had long counseling with patient and wife and her daughters, and he is to continue his Lexapro for anxiety and depression, and his wife's medications were adjusted. We also talked to the daughters, about family therapy. The patient has not had any new medical problems complications and or illnesses. He has a past history of memory loss, high blood pressure, anxiety and vasectomy. Past Medical History:   Diagnosis Date    Anxiety disorder     Depression     Essential hypertension     Memory disorder     Neurological disorder       Past Surgical History:   Procedure Laterality Date    HX VASECTOMY  67     Family History   Problem Relation Age of Onset    Dementia Mother     Heart Disease Father       Social History   Substance Use Topics    Smoking status: Never Smoker    Smokeless tobacco: Never Used    Alcohol use No       Current Outpatient Prescriptions   Medication Sig Dispense Refill    irbesartan (AVAPRO) 150 mg tablet Take 150 mg by mouth nightly.  zonisamide (ZONEGRAN) 100 mg capsule Take 1 Cap by mouth daily. 90 Cap 5    NAMZARIC 28-10 mg CSpX TAKE 1 CAPSULE BY MOUTH EVERY DAY 90 Cap 1    carbidopa-levodopa (SINEMET)  mg per tablet Take 1 Tab by mouth three (3) times daily. Indications: Parkinsonism, Restless Legs Syndrome 270 Tab 3    sertraline (ZOLOFT) 100 mg tablet TAKE 1 TABLET BY MOUTH EVERY MORNING  5    ascorbic acid, vitamin C, (VITAMIN C) 500 mg tablet Take  by mouth two (2) times a day.  divalproex ER (DEPAKOTE ER) 500 mg ER tablet daily.       spironolactone (ALDACTONE) 25 mg tablet 25 mg two (2) times a day.  tamsulosin (FLOMAX) 0.4 mg capsule Take 0.4 mg by mouth daily.  vitamin e 400 unit tab Take  by mouth.  VIT C/TERELL AC/LUT/COPPER/ZNOX (PRESERVISION LUTEIN PO) Take  by mouth.  aspirin 81 mg tablet Take 81 mg by mouth.  multivitamins-minerals-lutein (CENTRUM SILVER) Tab Take  by mouth.  Cholecalciferol, Vitamin D3, (VITAMIN D3) 1,000 unit cap Take  by mouth. No Known Allergies     Review of Systems:  A comprehensive review of systems was negative except for: Neurological: positive for memory problems     Objective:     I      NEUROLOGICAL EXAM:    Appearance: The patient is well developed, well nourished, provides a incoherent history and is in no acute distress. Mental Status: Oriented to place and person not the president and not the date. Patient can not remember one of 3 words at 30 seconds with distraction. Difficulty naming his children initially. Patient still a little mentally slow and has difficulty with words and names at times. Patient has difficult time doing clock drawing, and has moderate recent memory loss. Mood and affect appropriate. Cranial Nerves:   Intact visual fields. Fundi are benign. JAQUELIN, EOM's full, no nystagmus, no ptosis. Facial sensation is normal. Corneal reflexes are not tested. Facial movement is symmetric, but patient has masklike facies. Hearing is abnormal bilaterally. Palate is midline with normal sternocleidomastoid and trapezius muscles are normal. Tongue is midline. Neck without meningismus or bruits  Temporal arteries are not tender or enlarged   Motor:  4/5 strength in upper and lower proximal and distal muscles. Normal bulk and increased tone eyes with cogwheel rigidity prominent in the upper extremities. . No fasciculations.    Reflexes:   Deep tendon reflexes 2+/4 and symmetrical.  No Babinski or clonus present   Sensory:   Normal to touch, pinprick and vibration and DSS. Gait:  Abnormal gait as patient is unsteady when he initially gets up and starts to move and on turns, and patient needs maximum assistance of one person to ambulate. Tremor:    Mild bilateral static and action tremor noted. Cerebellar:  Abnormal Romberg and tandem cerebellar signs present. Neurovascular:  Normal heart sounds and regular rhythm, peripheral pulses decreased, and no carotid bruits. Assessment:         Plan:     Patient with increasing agitated behavior, and we will try increasing his zonisamide for his Parkinson's disease and behavior and see how he does on that. With increasing weakness and difficulty walking, associated with parkinsonian-like features probably secondary to his neuroleptics of Zyprexa which she is currently off of. He will continue Sinemet 25/100 mg one half pill 3 times a day one half pill 3 times a day  We may need to increase his Depakote in the near future for behavior problems  He will start Zonegran 100 mg once a day and possibly advance slowly if tolerated for his movement disorder of Parkinson's  His weight loss is probably related to the Zoloft, but he will continue that  Moderate to severe dementia consistent with Alzheimer's disease  Patient to continue Depakote 500 mg once daily which seems to be providing some control of agitation and confusion  We advised him to watch his weight gain, and the family will monitor that.   He is to continue Lexapro and Namzaric and neither melatonin nor Neurontin were tolerated  He is to continue his multivitamins and vitamin D on a regular basis and remained mentally and physically active  He is no longer driving or working at this time  Patient condition discussed with family and 20 minutes spent counseling the family, about the disease, medications and prognosis, and they were advised to start planning for long-term care, get power of  and living Will  We will see him again in 6 months, earlier if needed. He will call if any problems. Discussed his diagnosis, treatment options and the possibility of new medications in the future with the patient and his family     Signed By: Jeanne Durán MD     July 5, 2018       This note will not be viewable in [address]

## 2018-09-28 DIAGNOSIS — F02.80 DEMENTIA OF THE ALZHEIMER'S TYPE WITH LATE ONSET WITHOUT BEHAVIORAL DISTURBANCE (HCC): ICD-10-CM

## 2018-09-28 DIAGNOSIS — G30.1 DEMENTIA OF THE ALZHEIMER'S TYPE WITH LATE ONSET WITHOUT BEHAVIORAL DISTURBANCE (HCC): ICD-10-CM

## 2018-09-28 RX ORDER — MEMANTINE HYDROCHLORIDE AND DONEPEZIL HYDROCHLORIDE 28; 10 MG/1; MG/1
CAPSULE ORAL
Qty: 90 CAP | Refills: 1 | Status: SHIPPED | OUTPATIENT
Start: 2018-09-28 | End: 2019-01-10 | Stop reason: SDUPTHER

## 2019-01-10 DIAGNOSIS — G30.1 DEMENTIA OF THE ALZHEIMER'S TYPE WITH LATE ONSET WITHOUT BEHAVIORAL DISTURBANCE (HCC): ICD-10-CM

## 2019-01-10 DIAGNOSIS — F02.80 DEMENTIA OF THE ALZHEIMER'S TYPE WITH LATE ONSET WITHOUT BEHAVIORAL DISTURBANCE (HCC): ICD-10-CM

## 2019-01-10 NOTE — TELEPHONE ENCOUNTER
Received 90 day supply request for Namzaric from [facility] pharmacy   Last filled 9/2/18  Dr. Mendoza Guidry, please refill

## 2019-02-22 ENCOUNTER — OFFICE VISIT (OUTPATIENT)
Dept: NEUROLOGY | Age: 74
End: 2019-02-22

## 2019-02-22 VITALS
WEIGHT: 237.6 LBS | RESPIRATION RATE: 12 BRPM | HEART RATE: 79 BPM | DIASTOLIC BLOOD PRESSURE: 68 MMHG | OXYGEN SATURATION: 96 % | BODY MASS INDEX: 33.26 KG/M2 | SYSTOLIC BLOOD PRESSURE: 104 MMHG | HEIGHT: 71 IN

## 2019-02-22 DIAGNOSIS — G30.1 DEMENTIA OF THE ALZHEIMER'S TYPE WITH LATE ONSET WITHOUT BEHAVIORAL DISTURBANCE (HCC): Primary | ICD-10-CM

## 2019-02-22 DIAGNOSIS — F02.80 DEMENTIA OF THE ALZHEIMER'S TYPE WITH LATE ONSET WITHOUT BEHAVIORAL DISTURBANCE (HCC): Primary | ICD-10-CM

## 2019-02-22 DIAGNOSIS — G20 PARKINSON'S DISEASE (TREMOR, STIFFNESS, SLOW MOTION, UNSTABLE POSTURE) (HCC): ICD-10-CM

## 2019-02-22 RX ORDER — DIVALPROEX SODIUM 500 MG/1
1000 TABLET, EXTENDED RELEASE ORAL DAILY
Qty: 60 TAB | Refills: 11 | Status: ON HOLD | OUTPATIENT
Start: 2019-02-22 | End: 2019-06-14

## 2019-02-22 RX ORDER — CARBIDOPA AND LEVODOPA 25; 100 MG/1; MG/1
1 TABLET ORAL 3 TIMES DAILY
Qty: 270 TAB | Refills: 3 | Status: ON HOLD | OUTPATIENT
Start: 2019-02-22 | End: 2019-06-14

## 2019-02-22 RX ORDER — ZONISAMIDE 100 MG/1
100 CAPSULE ORAL DAILY
Qty: 90 CAP | Refills: 5 | Status: SHIPPED | OUTPATIENT
Start: 2019-02-22 | End: 2020-04-27

## 2019-02-22 NOTE — LETTER
2/22/2019 1:07 PM 
 
Patient:  Keegan Naidu YOB: 1945 Date of Visit: 2/22/2019 Dear No Recipients: Thank you for referring Mr. Jarad Corbett to me for evaluation/treatment. Below are the relevant portions of my assessment and plan of care. Consult Subjective:  
 
Keegan Naidu is a 72 y. o.right-handed  male seen today for evaluation at the request of Dr. eMlia Boateng of new problem sundowning each night between 5 and 7 PM with increasing difficulty with behavior, sometimes getting agitated and striking out at his caregivers, sometimes hallucinating and seeing people, yelling and swearing at his caregivers and refusing to take his medications at times. 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 Patient also on 500 mg of the Depakote extended release, and we will increase that to 1000 mg at 5 PM his dinnertime to see if that helps his sundowning behavior without causing too much drowsiness or sedation. He tends to be sleeping more in the daytime and less at nighttime and hopefully this will also help that. He does see a psychiatrist Dr. Ela Meeks has not made any changes in medication recently. He is walking some with assistance, but very little as he refuses to get up much, unless he is agitated, and would not cooperate with physical therapy and his exercise program so did not get much benefit out of that. He is also seen for increasing weakness and lack of mobility and some increasing tremors more when he tries to do things and more difficulty walking which he cannot do without assistance now and increasing confusion and memory loss and progressive dementia and increasing unsteady gait.   Patient seems to have developed parkinsonian symptoms, with increased generalized rigidity, slowness of movement, bradykinesia, cogwheel rigidity, but not much resting tremor but he does seem to have a little bit of an essential tremor or static tremor. He also has masklike facies. He is probably getting EPS  from his Zyprexa. Less likely has primary Parkinson's disease. He does not actively hallucinating all that much. He has had no new focal weakness, but just generally weak all over. He seems to be much more rigid and generally slow and having difficulty moving and having a masslike facies. Is really not on any neuroleptics. Patient cannot use a cane or walker successfully because he does not have the cognitive ability to handle them. Patient cognition continues to deteriorate rather fast, the family is trying to handle him at home, and does have sitters in the house 7 days a week now for 24 hours a day. They give the patient his medication and his wife to her medication, arrange the meals, and try to get him to exercise some. The family does not want to place him in a nursing home and the patient themselves absolutely refuse to go to assisted living. He is on Depakote 500 mg once a day by his psychiatrist, and patient now on Zoloft 100 mg a day, with some recent weight loss, and also taking them Namzaric  28 mg once a day, and on Zyprexa 5 mg a day. Neurontin seemed to make him worse that was discontinued. He is no longer driving. He has had progression of disease and no longer goes to work and his  watches him closely. Family has arranged a power of , living will and are considering long-term management. We discussed his condition with his family and office visit was 40 minutes today, 20 minutes of which was counseling the family. We tried a home health evaluation and therapy, but neither he nor his wife tolerated strangers in the house very well. The family notices that he is having more difficulty finishing sentences and finishing conversation.  He has had no other new focal weakness sensory loss visual changes or gait problems or bowel or bladder problems. He has had a normal CT of the head and normal metabolic screening for treatable causes of his memory loss recently on a hospital visit he was found to have urinary tract infection. We discussed urinary suppression, we will try vitamin C 1000 mg first. 
We had long counseling with patient and wife and her daughters, and he is to continue his Lexapro for anxiety and depression, and his wife's medications were adjusted. We also talked to the daughters, about family therapy. The patient has not had any new medical problems complications and or illnesses. He has a past history of memory loss, high blood pressure, anxiety and vasectomy. Past Medical History:  
Diagnosis Date  Anxiety disorder  Depression  Essential hypertension  Memory disorder  Neurological disorder Past Surgical History:  
Procedure Laterality Date [address] Family History Problem Relation Age of Onset  Dementia Mother  Heart Disease Father Social History Tobacco Use  Smoking status: Never Smoker  Smokeless tobacco: Never Used Substance Use Topics  Alcohol use: No  
   
Current Outpatient Medications Medication Sig Dispense Refill  divalproex ER (DEPAKOTE ER) 500 mg ER tablet Take 2 Tabs by mouth daily. 60 Tab 11  
 zonisamide (ZONEGRAN) 100 mg capsule Take 1 Cap by mouth daily. 90 Cap 5  carbidopa-levodopa (SINEMET)  mg per tablet Take 1 Tab by mouth three (3) times daily. 270 Tab 3  
 memantine-donepezil (NAMZARIC) 28-10 mg CSpX TAKE 1 CAPSULE BY MOUTH EVERY DAY 90 Cap 3  
 irbesartan (AVAPRO) 150 mg tablet Take 150 mg by mouth nightly.  sertraline (ZOLOFT) 100 mg tablet TAKE 1 TABLET BY MOUTH EVERY MORNING  5  
 ascorbic acid, vitamin C, (VITAMIN C) 500 mg tablet Take  by mouth two (2) times a day.  spironolactone (ALDACTONE) 25 mg tablet 25 mg two (2) times a day.  tamsulosin (FLOMAX) 0.4 mg capsule Take 0.4 mg by mouth daily.  vitamin e 400 unit tab Take  by mouth.  VIT C/TERELL AC/LUT/COPPER/ZNOX (PRESERVISION LUTEIN PO) Take  by mouth.  aspirin 81 mg tablet Take 81 mg by mouth.  multivitamins-minerals-lutein (CENTRUM SILVER) Tab Take  by mouth.  Cholecalciferol, Vitamin D3, (VITAMIN D3) 1,000 unit cap Take  by mouth. No Known Allergies Review of Systems: A comprehensive review of systems was negative except for: Neurological: positive for memory problems Objective: I 
 
 
NEUROLOGICAL EXAM: 
 
Appearance: The patient is well developed, well nourished, moderately overweight, provides a incoherent history and is in no acute distress. Mental Status: Oriented to place and person not the president and not the date. Patient can not remember one of 3 words at 30 seconds with distraction. Difficulty naming his children. Patient still a little mentally slow and has difficulty with words and names at times. Patient has difficult time doing clock drawing, and has moderate recent memory loss. Mood and affect appropriate. Cranial Nerves:   Intact visual fields. Fundi are benign, disc are flat, no lesions seen on funduscopy. JAQUELIN, EOM's full, no nystagmus, no ptosis. Facial sensation is normal. Corneal reflexes are not tested. Facial movement is symmetric, but patient has masklike facies. Hearing is abnormal bilaterally. Palate is midline with normal sternocleidomastoid and trapezius muscles are normal. Tongue is midline. Neck without meningismus or bruits Temporal arteries are not tender or enlarged Motor:  4/5 strength in upper and lower proximal and distal muscles. Normal bulk and increased tone eyes with cogwheel rigidity prominent in the upper extremities. . No fasciculations. Patient has decreased rapid alternating movement in all extremities Reflexes:   Deep tendon reflexes 2+/4 and symmetrical. 
 No Babinski or clonus present Sensory:   Normal to touch, pinprick and vibration and DSS. Gait:  Abnormal gait as patient is unsteady when he initially gets up and starts to move and on turns, and patient needs maximum assistance of one person to ambulate. Tremor:    Mild bilateral static and action tremor noted. Cerebellar:  Abnormal Romberg and tandem cerebellar signs present. Finger-nose-finger exam performed slowly but symmetrically Neurovascular:  Normal heart sounds and regular rhythm, peripheral pulses decreased, and no carotid bruits. Assessment:  
 
 
 
Plan:  
 
Patient with increasing sundowning from 5-7 each night, not sleeping well, increasing hallucinations and agitated behavior, we will increase his Depakote to 1000 mg at 5 PM at dinnertime each night, see if that helps the behavior and his not sleeping. Family advised GI side effects, pacing drowsiness or sedation or any side effect to call us immediately. We will continue his zonisamide, 100 mg twice a day for his Parkinson's disease and behavior and see how he does on that. With increasing weakness and difficulty walking, associated with parkinsonian-like features probably secondary to his neuroleptics of Zyprexa which she is currently off of. He will continue Sinemet 25/100 mg one half pill 3 times a day one half pill 3 times a day Moderate to severe dementia consistent with Alzheimer's disease We advised him to watch his weight gain, and the family will monitor that. He is to continue Lexapro and Namzaric and neither melatonin nor Neurontin were tolerated He is to continue his multivitamins and vitamin D on a regular basis and remained mentally and physically active He is no longer driving or working at this time Patient condition discussed with family and 20 minutes spent counseling the family, about the disease, medications and prognosis, and they were advised to start planning for long-term care, get power of  and living Will We will see him again in 6 months, earlier if needed. He will call if any problems. Discussed his diagnosis, treatment options and the possibility of new medications in the future with the patient and his family Signed By: Amaury Lu MD   
 February 22, 2019 This note will not be viewable in [address] If you have questions, please do not hesitate to call me. I look forward to following Mr. Mackenzie Barriga along with you. Sincerely, Amaury Lu MD

## 2019-02-22 NOTE — PATIENT INSTRUCTIONS
A Healthy Lifestyle: Care Instructions Your Care Instructions A healthy lifestyle can help you feel good, stay at a healthy weight, and have plenty of energy for both work and play. A healthy lifestyle is something you can share with your whole family. A healthy lifestyle also can lower your risk for serious health problems, such as high blood pressure, heart disease, and diabetes. You can follow a few steps listed below to improve your health and the health of your family. Follow-up care is a key part of your treatment and safety. Be sure to make and go to all appointments, and call your doctor if you are having problems. It's also a good idea to know your test results and keep a list of the medicines you take. How can you care for yourself at home? · Do not eat too much sugar, fat, or fast foods. You can still have dessert and treats now and then. The goal is moderation. · Start small to improve your eating habits. Pay attention to portion sizes, drink less juice and soda pop, and eat more fruits and vegetables. ? Eat a healthy amount of food. A 3-ounce serving of meat, for example, is about the size of a deck of cards. Fill the rest of your plate with vegetables and whole grains. ? Limit the amount of soda and sports drinks you have every day. Drink more water when you are thirsty. ? Eat at least 5 servings of fruits and vegetables every day. It may seem like a lot, but it is not hard to reach this goal. A serving or helping is 1 piece of fruit, 1 cup of vegetables, or 2 cups of leafy, raw vegetables. Have an apple or some carrot sticks as an afternoon snack instead of a candy bar. Try to have fruits and/or vegetables at every meal. 
· Make exercise part of your daily routine. You may want to start with simple activities, such as walking, bicycling, or slow swimming. Try to be active 30 to 60 minutes every day.  You do not need to do all 30 to 60 minutes all at once. For example, you can exercise 3 times a day for 10 or 20 minutes. Moderate exercise is safe for most people, but it is always a good idea to talk to your doctor before starting an exercise program. 
· Keep moving. Jinduncan King the lawn, work in the garden, or [employer]. Take the stairs instead of the elevator at work. · If you smoke, quit. People who smoke have an increased risk for heart attack, stroke, cancer, and other lung illnesses. Quitting is hard, but there are ways to boost your chance of quitting tobacco for good. ? Use nicotine gum, patches, or lozenges. ? Ask your doctor about stop-smoking programs and medicines. ? Keep trying. In addition to reducing your risk of diseases in the future, you will notice some benefits soon after you stop using tobacco. If you have shortness of breath or asthma symptoms, they will likely get better within a few weeks after you quit. · Limit how much alcohol you drink. Moderate amounts of alcohol (up to 2 drinks a day for men, 1 drink a day for women) are okay. But drinking too much can lead to liver problems, high blood pressure, and other health problems. Family health If you have a family, there are many things you can do together to improve your health. · Eat meals together as a family as often as possible. · Eat healthy foods. This includes fruits, vegetables, lean meats and dairy, and whole grains. · Include your family in your fitness plan. Most people think of activities such as jogging or tennis as the way to fitness, but there are many ways you and your family can be more active. Anything that makes you breathe hard and gets your heart pumping is exercise. Here are some tips: 
? Walk to do errands or to take your child to school or the bus. 
? Go for a family bike ride after dinner instead of watching TV. Where can you learn more? Go to http://carolyn-warner.info/. Enter A644 in the search box to learn more about \"A Healthy Lifestyle: Care Instructions. \" Current as of: September 11, 2018 Content Version: 11.9 © 7113-1415 [employer]. Care instructions adapted under license by [employer] (which disclaims liability or warranty for this information). If you have questions about a medical condition or this instruction, always ask your healthcare professional. [address] any warranty or liability for your use of this information. Office Policieso Phone calls/patient messages: Please allow up to 24 hours for someone in the office to contact you about your call or message. Be mindful your provider may be out of the office or your message may require further review. We encourage you to use [employer] for your messages as this is a faster, more efficient way to communicate with our office 
o Medication Refills: 
Prescription medications require up to 48 business hours to process. We encourage you to use [employer] for your refills. For controlled medications: Please allow up to 72 business hours to process. Certain medications may require you to  a written prescription at our office. NO narcotic/controlled medications will be prescribed after 4pm Monday through Friday or on weekends 
o Form/Paperwork Completion: We ask that you allow 7-14 business days. You may also download your forms to [employer] to have your doctor print off.

## 2019-02-22 NOTE — PROGRESS NOTES
Consult Subjective:  
 
Amber العراقي is a 72 y. o.right-handed  male seen today for evaluation at the request of Dr. Wendy Casillas of new problem sundowning each night between 5 and 7 PM with increasing difficulty with behavior, sometimes getting agitated and striking out at his caregivers, sometimes hallucinating and seeing people, yelling and swearing at his caregivers and refusing to take his medications at times. 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 Patient also on 500 mg of the Depakote extended release, and we will increase that to 1000 mg at 5 PM his dinnertime to see if that helps his sundowning behavior without causing too much drowsiness or sedation. He tends to be sleeping more in the daytime and less at nighttime and hopefully this will also help that. He does see a psychiatrist Dr. Sweetie Yoder has not made any changes in medication recently. He is walking some with assistance, but very little as he refuses to get up much, unless he is agitated, and would not cooperate with physical therapy and his exercise program so did not get much benefit out of that. He is also seen for increasing weakness and lack of mobility and some increasing tremors more when he tries to do things and more difficulty walking which he cannot do without assistance now and increasing confusion and memory loss and progressive dementia and increasing unsteady gait. Patient seems to have developed parkinsonian symptoms, with increased generalized rigidity, slowness of movement, bradykinesia, cogwheel rigidity, but not much resting tremor but he does seem to have a little bit of an essential tremor or static tremor. He also has masklike facies. He is probably getting EPS  from his Zyprexa.   Less likely has primary Parkinson's disease. He does not actively hallucinating all that much. He has had no new focal weakness, but just generally weak all over. He seems to be much more rigid and generally slow and having difficulty moving and having a masslike facies. Is really not on any neuroleptics. Patient cannot use a cane or walker successfully because he does not have the cognitive ability to handle them. Patient cognition continues to deteriorate rather fast, the family is trying to handle him at home, and does have sitters in the house 7 days a week now for 24 hours a day. They give the patient his medication and his wife to her medication, arrange the meals, and try to get him to exercise some. The family does not want to place him in a nursing home and the patient themselves absolutely refuse to go to assisted living. He is on Depakote 500 mg once a day by his psychiatrist, and patient now on Zoloft 100 mg a day, with some recent weight loss, and also taking them Namzaric  28 mg once a day, and on Zyprexa 5 mg a day. Neurontin seemed to make him worse that was discontinued. He is no longer driving. He has had progression of disease and no longer goes to work and his  watches him closely. Family has arranged a power of , living will and are considering long-term management. We discussed his condition with his family and office visit was 40 minutes today, 20 minutes of which was counseling the family. We tried a home health evaluation and therapy, but neither he nor his wife tolerated strangers in the house very well. The family notices that he is having more difficulty finishing sentences and finishing conversation. He has had no other new focal weakness sensory loss visual changes or gait problems or bowel or bladder problems.  He has had a normal CT of the head and normal metabolic screening for treatable causes of his memory loss recently on a hospital visit he was found to have urinary tract infection. We discussed urinary suppression, we will try vitamin C 1000 mg first. 
We had long counseling with patient and wife and her daughters, and he is to continue his Lexapro for anxiety and depression, and his wife's medications were adjusted. We also talked to the daughters, about family therapy. The patient has not had any new medical problems complications and or illnesses. He has a past history of memory loss, high blood pressure, anxiety and vasectomy. Past Medical History:  
Diagnosis Date  Anxiety disorder  Depression  Essential hypertension  Memory disorder  Neurological disorder Past Surgical History:  
Procedure Laterality Date [address] Family History Problem Relation Age of Onset  Dementia Mother  Heart Disease Father Social History Tobacco Use  Smoking status: Never Smoker  Smokeless tobacco: Never Used Substance Use Topics  Alcohol use: No  
   
Current Outpatient Medications Medication Sig Dispense Refill  divalproex ER (DEPAKOTE ER) 500 mg ER tablet Take 2 Tabs by mouth daily. 60 Tab 11  
 zonisamide (ZONEGRAN) 100 mg capsule Take 1 Cap by mouth daily. 90 Cap 5  carbidopa-levodopa (SINEMET)  mg per tablet Take 1 Tab by mouth three (3) times daily. 270 Tab 3  
 memantine-donepezil (NAMZARIC) 28-10 mg CSpX TAKE 1 CAPSULE BY MOUTH EVERY DAY 90 Cap 3  
 irbesartan (AVAPRO) 150 mg tablet Take 150 mg by mouth nightly.  sertraline (ZOLOFT) 100 mg tablet TAKE 1 TABLET BY MOUTH EVERY MORNING  5  
 ascorbic acid, vitamin C, (VITAMIN C) 500 mg tablet Take  by mouth two (2) times a day.  spironolactone (ALDACTONE) 25 mg tablet 25 mg two (2) times a day.  tamsulosin (FLOMAX) 0.4 mg capsule Take 0.4 mg by mouth daily.  vitamin e 400 unit tab Take  by mouth.  VIT C/TERELL AC/LUT/COPPER/ZNOX (PRESERVISION LUTEIN PO) Take  by mouth.  aspirin 81 mg tablet Take 81 mg by mouth.  multivitamins-minerals-lutein (CENTRUM SILVER) Tab Take  by mouth.  Cholecalciferol, Vitamin D3, (VITAMIN D3) 1,000 unit cap Take  by mouth. No Known Allergies Review of Systems: A comprehensive review of systems was negative except for: Neurological: positive for memory problems Objective: I 
 
 
NEUROLOGICAL EXAM: 
 
Appearance: The patient is well developed, well nourished, moderately overweight, provides a incoherent history and is in no acute distress. Mental Status: Oriented to place and person not the president and not the date. Patient can not remember one of 3 words at 30 seconds with distraction. Difficulty naming his children. Patient still a little mentally slow and has difficulty with words and names at times. Patient has difficult time doing clock drawing, and has moderate recent memory loss. Mood and affect appropriate. Cranial Nerves:   Intact visual fields. Fundi are benign, disc are flat, no lesions seen on funduscopy. JAQUELIN, EOM's full, no nystagmus, no ptosis. Facial sensation is normal. Corneal reflexes are not tested. Facial movement is symmetric, but patient has masklike facies. Hearing is abnormal bilaterally. Palate is midline with normal sternocleidomastoid and trapezius muscles are normal. Tongue is midline. Neck without meningismus or bruits Temporal arteries are not tender or enlarged Motor:  4/5 strength in upper and lower proximal and distal muscles. Normal bulk and increased tone eyes with cogwheel rigidity prominent in the upper extremities. . No fasciculations. Patient has decreased rapid alternating movement in all extremities Reflexes:   Deep tendon reflexes 2+/4 and symmetrical. 
No Babinski or clonus present Sensory:   Normal to touch, pinprick and vibration and DSS.   
Gait:  Abnormal gait as patient is unsteady when he initially gets up and starts to move and on turns, and patient needs maximum assistance of one person to ambulate. Tremor:    Mild bilateral static and action tremor noted. Cerebellar:  Abnormal Romberg and tandem cerebellar signs present. Finger-nose-finger exam performed slowly but symmetrically Neurovascular:  Normal heart sounds and regular rhythm, peripheral pulses decreased, and no carotid bruits. Assessment:  
 
 
 
Plan:  
 
Patient with increasing sundowning from 5-7 each night, not sleeping well, increasing hallucinations and agitated behavior, we will increase his Depakote to 1000 mg at 5 PM at dinnertime each night, see if that helps the behavior and his not sleeping. Family advised GI side effects, pacing drowsiness or sedation or any side effect to call us immediately. We will continue his zonisamide, 100 mg twice a day for his Parkinson's disease and behavior and see how he does on that. With increasing weakness and difficulty walking, associated with parkinsonian-like features probably secondary to his neuroleptics of Zyprexa which she is currently off of. He will continue Sinemet 25/100 mg one half pill 3 times a day one half pill 3 times a day Moderate to severe dementia consistent with Alzheimer's disease We advised him to watch his weight gain, and the family will monitor that. He is to continue Lexapro and Namzaric and neither melatonin nor Neurontin were tolerated He is to continue his multivitamins and vitamin D on a regular basis and remained mentally and physically active He is no longer driving or working at this time Patient condition discussed with family and 20 minutes spent counseling the family, about the disease, medications and prognosis, and they were advised to start planning for long-term care, get power of  and living Will We will see him again in 6 months, earlier if needed. He will call if any problems. Discussed his diagnosis, treatment options and the possibility of new medications in the future with the patient and his family Signed By: Milan Stokes MD   
 February 22, 2019 This note will not be viewable in [address]

## 2019-06-14 ENCOUNTER — APPOINTMENT (OUTPATIENT)
Dept: GENERAL RADIOLOGY | Age: 74
DRG: 853 | End: 2019-06-14
Attending: EMERGENCY MEDICINE
Payer: MEDICARE

## 2019-06-14 ENCOUNTER — APPOINTMENT (OUTPATIENT)
Dept: CT IMAGING | Age: 74
DRG: 853 | End: 2019-06-14
Attending: EMERGENCY MEDICINE
Payer: MEDICARE

## 2019-06-14 ENCOUNTER — HOSPITAL ENCOUNTER (INPATIENT)
Age: 74
LOS: 8 days | Discharge: HOME | DRG: 853 | End: 2019-06-22
Attending: EMERGENCY MEDICINE | Admitting: SURGERY
Payer: MEDICARE

## 2019-06-14 DIAGNOSIS — K35.32 APPENDICITIS WITH PERFORATION: ICD-10-CM

## 2019-06-14 DIAGNOSIS — K35.32 RUPTURED APPENDICITIS: Primary | ICD-10-CM

## 2019-06-14 DIAGNOSIS — A41.9 SEPSIS, DUE TO UNSPECIFIED ORGANISM: ICD-10-CM

## 2019-06-14 LAB
ALBUMIN SERPL-MCNC: 3.4 G/DL (ref 3.5–5)
ALBUMIN/GLOB SERPL: 0.7 {RATIO} (ref 1.1–2.2)
ALP SERPL-CCNC: 68 U/L (ref 45–117)
ALT SERPL-CCNC: 12 U/L (ref 12–78)
AMORPH CRY URNS QL MICRO: ABNORMAL
ANION GAP SERPL CALC-SCNC: 12 MMOL/L (ref 5–15)
APPEARANCE UR: CLEAR
AST SERPL-CCNC: 12 U/L (ref 15–37)
ATRIAL RATE: 107 BPM
BACTERIA URNS QL MICRO: ABNORMAL /HPF
BASOPHILS # BLD: 0 K/UL (ref 0–0.1)
BASOPHILS NFR BLD: 0 % (ref 0–1)
BILIRUB SERPL-MCNC: 0.9 MG/DL (ref 0.2–1)
BILIRUB UR QL CFM: NEGATIVE
BUN SERPL-MCNC: 38 MG/DL (ref 6–20)
BUN/CREAT SERPL: 13 (ref 12–20)
CALCIUM SERPL-MCNC: 9.4 MG/DL (ref 8.5–10.1)
CALCULATED P AXIS, ECG09: 41 DEGREES
CALCULATED R AXIS, ECG10: -25 DEGREES
CALCULATED T AXIS, ECG11: 126 DEGREES
CHLORIDE SERPL-SCNC: 101 MMOL/L (ref 97–108)
CO2 SERPL-SCNC: 27 MMOL/L (ref 21–32)
COLOR UR: ABNORMAL
COMMENT, HOLDF: NORMAL
CREAT SERPL-MCNC: 2.98 MG/DL (ref 0.7–1.3)
DIAGNOSIS, 93000: NORMAL
DIFFERENTIAL METHOD BLD: ABNORMAL
EOSINOPHIL # BLD: 0 K/UL (ref 0–0.4)
EOSINOPHIL NFR BLD: 0 % (ref 0–7)
EPITH CASTS URNS QL MICRO: ABNORMAL /LPF
ERYTHROCYTE [DISTWIDTH] IN BLOOD BY AUTOMATED COUNT: 13.9 % (ref 11.5–14.5)
GLOBULIN SER CALC-MCNC: 4.6 G/DL (ref 2–4)
GLUCOSE SERPL-MCNC: 134 MG/DL (ref 65–100)
GLUCOSE UR STRIP.AUTO-MCNC: NEGATIVE MG/DL
HCT VFR BLD AUTO: 43.1 % (ref 36.6–50.3)
HGB BLD-MCNC: 14.1 G/DL (ref 12.1–17)
HGB UR QL STRIP: ABNORMAL
IMM GRANULOCYTES # BLD AUTO: 0.1 K/UL (ref 0–0.04)
IMM GRANULOCYTES NFR BLD AUTO: 1 % (ref 0–0.5)
KETONES UR QL STRIP.AUTO: ABNORMAL MG/DL
LACTATE SERPL-SCNC: 2.5 MMOL/L (ref 0.4–2)
LACTATE SERPL-SCNC: 3.2 MMOL/L (ref 0.4–2)
LEUKOCYTE ESTERASE UR QL STRIP.AUTO: ABNORMAL
LYMPHOCYTES # BLD: 1.2 K/UL (ref 0.8–3.5)
LYMPHOCYTES NFR BLD: 6 % (ref 12–49)
MAGNESIUM SERPL-MCNC: 2.2 MG/DL (ref 1.6–2.4)
MCH RBC QN AUTO: 30.7 PG (ref 26–34)
MCHC RBC AUTO-ENTMCNC: 32.7 G/DL (ref 30–36.5)
MCV RBC AUTO: 93.9 FL (ref 80–99)
MONOCYTES # BLD: 0.9 K/UL (ref 0–1)
MONOCYTES NFR BLD: 5 % (ref 5–13)
MUCOUS THREADS URNS QL MICRO: ABNORMAL /LPF
NEUTS SEG # BLD: 17.3 K/UL (ref 1.8–8)
NEUTS SEG NFR BLD: 88 % (ref 32–75)
NITRITE UR QL STRIP.AUTO: POSITIVE
NRBC # BLD: 0 K/UL (ref 0–0.01)
NRBC BLD-RTO: 0 PER 100 WBC
OTHER,OTHU: ABNORMAL
P-R INTERVAL, ECG05: 132 MS
PH UR STRIP: 5 [PH] (ref 5–8)
PLATELET # BLD AUTO: 302 K/UL (ref 150–400)
PMV BLD AUTO: 10.6 FL (ref 8.9–12.9)
POTASSIUM SERPL-SCNC: 4.5 MMOL/L (ref 3.5–5.1)
PROT SERPL-MCNC: 8 G/DL (ref 6.4–8.2)
PROT UR STRIP-MCNC: 30 MG/DL
Q-T INTERVAL, ECG07: 312 MS
QRS DURATION, ECG06: 84 MS
QTC CALCULATION (BEZET), ECG08: 416 MS
RBC # BLD AUTO: 4.59 M/UL (ref 4.1–5.7)
RBC #/AREA URNS HPF: ABNORMAL /HPF (ref 0–5)
SAMPLES BEING HELD,HOLD: NORMAL
SODIUM SERPL-SCNC: 140 MMOL/L (ref 136–145)
SP GR UR REFRACTOMETRY: 1.03 (ref 1–1.03)
TROPONIN I SERPL-MCNC: <0.05 NG/ML
UR CULT HOLD, URHOLD: NORMAL
UROBILINOGEN UR QL STRIP.AUTO: 0.2 EU/DL (ref 0.2–1)
VENTRICULAR RATE, ECG03: 107 BPM
WBC # BLD AUTO: 19.5 K/UL (ref 4.1–11.1)
WBC URNS QL MICRO: ABNORMAL /HPF (ref 0–4)

## 2019-06-14 PROCEDURE — P9045 ALBUMIN (HUMAN), 5%, 250 ML: HCPCS | Performed by: SURGERY

## 2019-06-14 PROCEDURE — 87076 CULTURE ANAEROBE IDENT EACH: CPT

## 2019-06-14 PROCEDURE — 81001 URINALYSIS AUTO W/SCOPE: CPT

## 2019-06-14 PROCEDURE — 96374 THER/PROPH/DIAG INJ IV PUSH: CPT

## 2019-06-14 PROCEDURE — 74011000258 HC RX REV CODE- 258: Performed by: EMERGENCY MEDICINE

## 2019-06-14 PROCEDURE — 73502 X-RAY EXAM HIP UNI 2-3 VIEWS: CPT

## 2019-06-14 PROCEDURE — 93005 ELECTROCARDIOGRAM TRACING: CPT

## 2019-06-14 PROCEDURE — 36415 COLL VENOUS BLD VENIPUNCTURE: CPT

## 2019-06-14 PROCEDURE — 70450 CT HEAD/BRAIN W/O DYE: CPT

## 2019-06-14 PROCEDURE — 74011250636 HC RX REV CODE- 250/636: Performed by: SURGERY

## 2019-06-14 PROCEDURE — 72131 CT LUMBAR SPINE W/O DYE: CPT

## 2019-06-14 PROCEDURE — 74176 CT ABD & PELVIS W/O CONTRAST: CPT

## 2019-06-14 PROCEDURE — 71045 X-RAY EXAM CHEST 1 VIEW: CPT

## 2019-06-14 PROCEDURE — P9045 ALBUMIN (HUMAN), 5%, 250 ML: HCPCS

## 2019-06-14 PROCEDURE — 85025 COMPLETE CBC W/AUTO DIFF WBC: CPT

## 2019-06-14 PROCEDURE — 96361 HYDRATE IV INFUSION ADD-ON: CPT

## 2019-06-14 PROCEDURE — 74011250636 HC RX REV CODE- 250/636: Performed by: EMERGENCY MEDICINE

## 2019-06-14 PROCEDURE — 65610000006 HC RM INTENSIVE CARE

## 2019-06-14 PROCEDURE — 96365 THER/PROPH/DIAG IV INF INIT: CPT

## 2019-06-14 PROCEDURE — 74011250637 HC RX REV CODE- 250/637: Performed by: SURGERY

## 2019-06-14 PROCEDURE — 83605 ASSAY OF LACTIC ACID: CPT

## 2019-06-14 PROCEDURE — 80053 COMPREHEN METABOLIC PANEL: CPT

## 2019-06-14 PROCEDURE — 99285 EMERGENCY DEPT VISIT HI MDM: CPT

## 2019-06-14 PROCEDURE — 83735 ASSAY OF MAGNESIUM: CPT

## 2019-06-14 PROCEDURE — 87040 BLOOD CULTURE FOR BACTERIA: CPT

## 2019-06-14 PROCEDURE — 74011250636 HC RX REV CODE- 250/636

## 2019-06-14 PROCEDURE — 84484 ASSAY OF TROPONIN QUANT: CPT

## 2019-06-14 RX ORDER — CARBIDOPA AND LEVODOPA 25; 100 MG/1; MG/1
0.5 TABLET ORAL 3 TIMES DAILY
Status: DISCONTINUED | OUTPATIENT
Start: 2019-06-14 | End: 2019-06-22 | Stop reason: HOSPADM

## 2019-06-14 RX ORDER — METRONIDAZOLE 500 MG/100ML
500 INJECTION, SOLUTION INTRAVENOUS EVERY 12 HOURS
Status: DISCONTINUED | OUTPATIENT
Start: 2019-06-14 | End: 2019-06-15

## 2019-06-14 RX ORDER — SODIUM CHLORIDE, SODIUM LACTATE, POTASSIUM CHLORIDE, CALCIUM CHLORIDE 600; 310; 30; 20 MG/100ML; MG/100ML; MG/100ML; MG/100ML
150 INJECTION, SOLUTION INTRAVENOUS CONTINUOUS
Status: DISCONTINUED | OUTPATIENT
Start: 2019-06-14 | End: 2019-06-16

## 2019-06-14 RX ORDER — SERTRALINE HYDROCHLORIDE 100 MG/1
150 TABLET, FILM COATED ORAL DAILY
COMMUNITY

## 2019-06-14 RX ORDER — DIVALPROEX SODIUM 500 MG/1
1000 TABLET, EXTENDED RELEASE ORAL DAILY
Status: DISCONTINUED | OUTPATIENT
Start: 2019-06-15 | End: 2019-06-14

## 2019-06-14 RX ORDER — DIVALPROEX SODIUM 500 MG/1
500 TABLET, EXTENDED RELEASE ORAL DAILY
COMMUNITY
End: 2020-08-26 | Stop reason: SDUPTHER

## 2019-06-14 RX ORDER — ALBUMIN HUMAN 50 G/1000ML
SOLUTION INTRAVENOUS
Status: COMPLETED
Start: 2019-06-14 | End: 2019-06-14

## 2019-06-14 RX ORDER — DONEPEZIL HYDROCHLORIDE 10 MG/1
10 TABLET, FILM COATED ORAL DAILY
Status: DISCONTINUED | OUTPATIENT
Start: 2019-06-15 | End: 2019-06-22 | Stop reason: HOSPADM

## 2019-06-14 RX ORDER — ALBUMIN HUMAN 50 G/1000ML
25 SOLUTION INTRAVENOUS ONCE
Status: COMPLETED | OUTPATIENT
Start: 2019-06-14 | End: 2019-06-14

## 2019-06-14 RX ORDER — LEVOFLOXACIN 5 MG/ML
500 INJECTION, SOLUTION INTRAVENOUS EVERY 24 HOURS
Status: DISCONTINUED | OUTPATIENT
Start: 2019-06-14 | End: 2019-06-14 | Stop reason: DRUGHIGH

## 2019-06-14 RX ORDER — SODIUM CHLORIDE 0.9 % (FLUSH) 0.9 %
5-40 SYRINGE (ML) INJECTION EVERY 8 HOURS
Status: DISCONTINUED | OUTPATIENT
Start: 2019-06-14 | End: 2019-06-22 | Stop reason: HOSPADM

## 2019-06-14 RX ORDER — TAMSULOSIN HYDROCHLORIDE 0.4 MG/1
0.4 CAPSULE ORAL DAILY
Status: DISCONTINUED | OUTPATIENT
Start: 2019-06-15 | End: 2019-06-22 | Stop reason: HOSPADM

## 2019-06-14 RX ORDER — LEVOFLOXACIN 5 MG/ML
500 INJECTION, SOLUTION INTRAVENOUS
Status: DISCONTINUED | OUTPATIENT
Start: 2019-06-14 | End: 2019-06-15

## 2019-06-14 RX ORDER — HYDROMORPHONE HYDROCHLORIDE 1 MG/ML
1 INJECTION, SOLUTION INTRAMUSCULAR; INTRAVENOUS; SUBCUTANEOUS
Status: DISCONTINUED | OUTPATIENT
Start: 2019-06-14 | End: 2019-06-22 | Stop reason: HOSPADM

## 2019-06-14 RX ORDER — SODIUM CHLORIDE 0.9 % (FLUSH) 0.9 %
5-40 SYRINGE (ML) INJECTION AS NEEDED
Status: DISCONTINUED | OUTPATIENT
Start: 2019-06-14 | End: 2019-06-22 | Stop reason: HOSPADM

## 2019-06-14 RX ORDER — DIPHENHYDRAMINE HYDROCHLORIDE 50 MG/ML
12.5 INJECTION, SOLUTION INTRAMUSCULAR; INTRAVENOUS
Status: ACTIVE | OUTPATIENT
Start: 2019-06-14 | End: 2019-06-15

## 2019-06-14 RX ORDER — METRONIDAZOLE 500 MG/100ML
500 INJECTION, SOLUTION INTRAVENOUS ONCE
Status: DISCONTINUED | OUTPATIENT
Start: 2019-06-14 | End: 2019-06-14

## 2019-06-14 RX ORDER — SODIUM CHLORIDE 0.9 % (FLUSH) 0.9 %
10 SYRINGE (ML) INJECTION ONCE
Status: DISCONTINUED | OUTPATIENT
Start: 2019-06-14 | End: 2019-06-14

## 2019-06-14 RX ORDER — DIVALPROEX SODIUM 500 MG/1
500 TABLET, EXTENDED RELEASE ORAL DAILY
Status: DISCONTINUED | OUTPATIENT
Start: 2019-06-15 | End: 2019-06-17 | Stop reason: CLARIF

## 2019-06-14 RX ORDER — MEMANTINE HYDROCHLORIDE 10 MG/1
10 TABLET ORAL 2 TIMES DAILY
Status: DISCONTINUED | OUTPATIENT
Start: 2019-06-15 | End: 2019-06-22 | Stop reason: HOSPADM

## 2019-06-14 RX ORDER — SPIRONOLACTONE 25 MG/1
25 TABLET ORAL 2 TIMES DAILY
Status: DISCONTINUED | OUTPATIENT
Start: 2019-06-14 | End: 2019-06-14

## 2019-06-14 RX ORDER — METRONIDAZOLE 500 MG/100ML
500 INJECTION, SOLUTION INTRAVENOUS EVERY 8 HOURS
Status: DISCONTINUED | OUTPATIENT
Start: 2019-06-14 | End: 2019-06-14 | Stop reason: CLARIF

## 2019-06-14 RX ORDER — SERTRALINE HYDROCHLORIDE 50 MG/1
150 TABLET, FILM COATED ORAL DAILY
Status: DISCONTINUED | OUTPATIENT
Start: 2019-06-15 | End: 2019-06-22 | Stop reason: HOSPADM

## 2019-06-14 RX ORDER — SODIUM CHLORIDE 0.9 % (FLUSH) 0.9 %
5-10 SYRINGE (ML) INJECTION AS NEEDED
Status: DISCONTINUED | OUTPATIENT
Start: 2019-06-14 | End: 2019-06-14

## 2019-06-14 RX ORDER — NALOXONE HYDROCHLORIDE 0.4 MG/ML
0.4 INJECTION, SOLUTION INTRAMUSCULAR; INTRAVENOUS; SUBCUTANEOUS AS NEEDED
Status: DISCONTINUED | OUTPATIENT
Start: 2019-06-14 | End: 2019-06-22 | Stop reason: HOSPADM

## 2019-06-14 RX ORDER — FENTANYL CITRATE 50 UG/ML
50 INJECTION, SOLUTION INTRAMUSCULAR; INTRAVENOUS ONCE
Status: COMPLETED | OUTPATIENT
Start: 2019-06-14 | End: 2019-06-14

## 2019-06-14 RX ORDER — CARBIDOPA AND LEVODOPA 25; 100 MG/1; MG/1
0.5 TABLET ORAL 3 TIMES DAILY
COMMUNITY
End: 2022-03-11 | Stop reason: SDUPTHER

## 2019-06-14 RX ORDER — ONDANSETRON 2 MG/ML
4 INJECTION INTRAMUSCULAR; INTRAVENOUS
Status: DISCONTINUED | OUTPATIENT
Start: 2019-06-14 | End: 2019-06-22 | Stop reason: HOSPADM

## 2019-06-14 RX ORDER — ZONISAMIDE 100 MG/1
100 CAPSULE ORAL DAILY
Status: DISCONTINUED | OUTPATIENT
Start: 2019-06-15 | End: 2019-06-18

## 2019-06-14 RX ADMIN — ALBUMIN HUMAN 25 G: 50 SOLUTION INTRAVENOUS at 20:52

## 2019-06-14 RX ADMIN — FENTANYL CITRATE 50 MCG: 50 INJECTION, SOLUTION INTRAMUSCULAR; INTRAVENOUS at 15:04

## 2019-06-14 RX ADMIN — ALBUMIN (HUMAN) 25 G: 12.5 INJECTION, SOLUTION INTRAVENOUS at 20:52

## 2019-06-14 RX ADMIN — CARBIDOPA AND LEVODOPA 0.5 TABLET: 25; 100 TABLET ORAL at 18:10

## 2019-06-14 RX ADMIN — CARBIDOPA AND LEVODOPA 0.5 TABLET: 25; 100 TABLET ORAL at 22:13

## 2019-06-14 RX ADMIN — SODIUM CHLORIDE 1000 ML: 900 INJECTION, SOLUTION INTRAVENOUS at 14:06

## 2019-06-14 RX ADMIN — SODIUM CHLORIDE 1000 ML: 900 INJECTION, SOLUTION INTRAVENOUS at 15:08

## 2019-06-14 RX ADMIN — SODIUM CHLORIDE 318 ML: 900 INJECTION, SOLUTION INTRAVENOUS at 14:38

## 2019-06-14 RX ADMIN — SODIUM CHLORIDE, SODIUM LACTATE, POTASSIUM CHLORIDE, AND CALCIUM CHLORIDE 150 ML/HR: 600; 310; 30; 20 INJECTION, SOLUTION INTRAVENOUS at 17:22

## 2019-06-14 RX ADMIN — LEVOFLOXACIN 500 MG: 5 INJECTION, SOLUTION INTRAVENOUS at 18:10

## 2019-06-14 RX ADMIN — Medication 10 ML: at 22:14

## 2019-06-14 RX ADMIN — PIPERACILLIN AND TAZOBACTAM 3.38 G: 3; .375 INJECTION, POWDER, FOR SOLUTION INTRAVENOUS at 14:34

## 2019-06-14 RX ADMIN — METRONIDAZOLE 500 MG: 500 INJECTION, SOLUTION INTRAVENOUS at 18:45

## 2019-06-14 RX ADMIN — HYDROMORPHONE HYDROCHLORIDE 1 MG: 1 INJECTION, SOLUTION INTRAMUSCULAR; INTRAVENOUS; SUBCUTANEOUS at 18:54

## 2019-06-14 RX ADMIN — Medication 10 ML: at 18:13

## 2019-06-14 RX ADMIN — ALBUMIN (HUMAN) 25 G: 12.5 INJECTION, SOLUTION INTRAVENOUS at 18:06

## 2019-06-14 NOTE — PROGRESS NOTES
Clinical Pharmacy Note: Metronidazole Dosing    Please note that the metronidazole dose for Oneal Tolentino has been changed to 500 mg IV q12h per [facility]-approved protocol. Please contact the pharmacy with any questions.     WILDER BasilioD

## 2019-06-14 NOTE — H&P
[facility] General Surgery History and Physical    History of Present Illness:      Eden Carlson is a 72 y.o. male who was Tx from [place]  for perforated appendicitis. He has been having some N/V and decreased appetite the last 2 days at home. He had a fever yesterday and felt worse today and his daughters brought him in. He has been having some abdominal pain as well. He has dementia and is not very communicative.      Past Medical History:   Diagnosis Date    Anxiety disorder     Depression     Essential hypertension     Memory disorder     Neurological disorder        Past Surgical History:   Procedure Laterality Date    HX OTHER SURGICAL      left big toe nail removed    HX VASECTOMY  1975         Current Facility-Administered Medications:     carbidopa-levodopa (SINEMET)  mg per tablet 0.5 Tab, 0.5 Tab, Oral, TID, Roman Shukla MD    [START ON 6/15/2019] tamsulosin (FLOMAX) capsule 0.4 mg, 0.4 mg, Oral, DAILY, Roman Shukla MD    [START ON 6/15/2019] sertraline (ZOLOFT) tablet 150 mg, 150 mg, Oral, DAILY, Roman Shukla MD    [START ON 6/15/2019] zonisamide (ZONEGRAN) capsule 100 mg, 100 mg, Oral, DAILY, Roman Shukla MD    sodium chloride (NS) flush 5-40 mL, 5-40 mL, IntraVENous, Q8H, Roman Shukla MD    sodium chloride (NS) flush 5-40 mL, 5-40 mL, IntraVENous, PRN, Roman Shukla MD    HYDROmorphone (PF) (DILAUDID) injection 1 mg, 1 mg, IntraVENous, Q4H PRN, Roman Shkula MD    naloxone [facility]) injection 0.4 mg, 0.4 mg, IntraVENous, PRN, Roman Shukla MD    ondansetron [facility]) injection 4 mg, 4 mg, IntraVENous, Q4H PRN, Roman Shkula MD    diphenhydrAMINE (BENADRYL) injection 12.5 mg, 12.5 mg, IntraVENous, ONCE PRN, Roman Shukla MD    lactated Ringers infusion, 150 mL/hr, IntraVENous, CONTINUOUS, Roman Shukla MD    albumin human 5% (BUMINATE) solution 25 g, 25 g, IntraVENous, ONCE, Roman Shukla MD    [START ON 6/15/2019] divalproex ER (DEPAKOTE ER) 24 hour tablet 500 mg, 500 mg, Oral, DAILY, Devin Chin MD    [START ON 6/15/2019] memantine [facility]) tablet 10 mg, 10 mg, Oral, BID, Devin Chin MD    [START ON 6/15/2019] donepezil (ARICEPT) tablet 10 mg, 10 mg, Oral, DAILY, Devin Chin MD    albumin human 5% (BUMINATE) solution 25 g, 25 g, IntraVENous, ONCE, Devin Chin MD    levoFLOXacin [facility]) 500 mg in D5W IVPB, 500 mg, IntraVENous, Q48H, Devin Chin MD    metroNIDAZOLE (FLAGYL) IVPB premix 500 mg, 500 mg, IntraVENous, Q12H, Devin Chin MD    No Known Allergies    Social History     Socioeconomic History    Marital status:      Spouse name: Not on file    Number of children: Not on file    Years of education: Not on file    Highest education level: Not on file   Occupational History    Not on file   Social Needs    Financial resource strain: Not on file    Food insecurity:     Worry: Not on file     Inability: Not on file    Transportation needs:     Medical: Not on file     Non-medical: Not on file   Tobacco Use    Smoking status: Never Smoker    Smokeless tobacco: Never Used   Substance and Sexual Activity    Alcohol use: No    Drug use: No    Sexual activity: Not on file   Lifestyle    Physical activity:     Days per week: Not on file     Minutes per session: Not on file    Stress: Not on file   Relationships    Social connections:     Talks on phone: Not on file     Gets together: Not on file     Attends Orthodoxy service: Not on file     Active member of club or organization: Not on file     Attends meetings of clubs or organizations: Not on file     Relationship status: Not on file    Intimate partner violence:     Fear of current or ex partner: Not on file     Emotionally abused: Not on file     Physically abused: Not on file     Forced sexual activity: Not on file   Other Topics Concern    Not on file   Social History Narrative    Not on file       Family History   Problem Relation Age of Onset    Dementia Mother     Heart Disease Father        ROS   Constitutional: positive for fevers  Ears, Nose, Mouth, Throat, and Face: negative  Respiratory: negative  Cardiovascular: negative  Gastrointestinal: positive for nausea, vomiting and abdominal pain  Genitourinary:negative  Integument/Breast: negative  Hematologic/Lymphatic: negative  Behavioral/Psychiatric: negative  Allergic/Immunologic: negative      Physical Exam:     Visit Vitals  /72 (BP 1 Location: Right arm, BP Patient Position: At rest)   Pulse (!) 107   Temp 99 °F (37.2 °C)   Resp 21   Ht 5' 11\" (1.803 m)   Wt 243 lb 13.3 oz (110.6 kg)   SpO2 95%   BMI 34.01 kg/m²       General - alert and oriented, no apparent distress  HEENT - no jaundice, no hearing imparement  Pulm - CTAB, no C/W/R  CV - RRR, no M/R/G  Abd - soft, ND, BS Present, TTP diffusely, mild guarding, no hernias or scars  Ext - pulses intact in UE and LE bilaterally, no edema  Skin - supple, no rashes  Psychiatric - normal affect, good mood    Labs  Lab Results   Component Value Date/Time    Sodium 140 06/14/2019 01:22 PM    Potassium 4.5 06/14/2019 01:22 PM    Chloride 101 06/14/2019 01:22 PM    CO2 27 06/14/2019 01:22 PM    Anion gap 12 06/14/2019 01:22 PM    Glucose 134 (H) 06/14/2019 01:22 PM    BUN 38 (H) 06/14/2019 01:22 PM    Creatinine 2.98 (H) 06/14/2019 01:22 PM    BUN/Creatinine ratio 13 06/14/2019 01:22 PM    GFR est AA 25 (L) 06/14/2019 01:22 PM    GFR est non-AA 21 (L) 06/14/2019 01:22 PM    Calcium 9.4 06/14/2019 01:22 PM    Bilirubin, total 0.9 06/14/2019 01:22 PM    AST (SGOT) 12 (L) 06/14/2019 01:22 PM    Alk.  phosphatase 68 06/14/2019 01:22 PM    Protein, total 8.0 06/14/2019 01:22 PM    Albumin 3.4 (L) 06/14/2019 01:22 PM    Globulin 4.6 (H) 06/14/2019 01:22 PM    A-G Ratio 0.7 (L) 06/14/2019 01:22 PM    ALT (SGPT) 12 06/14/2019 01:22 PM     Lab Results   Component Value Date/Time    WBC 19.5 (H) 06/14/2019 01:22 PM    HGB 14.1 06/14/2019 01:22 PM    HCT 43.1 06/14/2019 01:22 PM    PLATELET 605 74/48/5149 01:22 PM    MCV 93.9 06/14/2019 01:22 PM     Lactic acid 2.5    Imaging  CT abd - LUNG BASES: Right basilar subsegmental atelectasis. INCIDENTALLY IMAGED HEART AND MEDIASTINUM: Calcified right hilar node. Coronary  artery calcification. LIVER: No mass or biliary dilatation. GALLBLADDER: Unremarkable. SPLEEN: No mass. PANCREAS: No mass or ductal dilatation. ADRENALS: Unremarkable. KIDNEYS/URETERS: No mass. 2 punctate nonobstructing calcifications in the right  kidney. , Right hydroureter secondary to 5 mm calculus at the pelvic inlet  (series 2, image 59). STOMACH: Unremarkable. SMALL BOWEL: No dilatation or wall thickening. COLON: No dilatation or wall thickening. APPENDIX: Appendix contains at least 2 calculi (image coronal 61). There is a  fluid collection just inferior to the appendix that measures 5 x 3 x 2 cm  (series 2, image 84 and coronal image 53). PERITONEUM: No ascites or pneumoperitoneum. RETROPERITONEUM: No lymphadenopathy or aortic aneurysm. REPRODUCTIVE ORGANS: Small prostate with central calcifications. URINARY BLADDER: No mass or calculus. BONES: Degenerative disk disease. ADDITIONAL COMMENTS: Fat-containing left inguinal hernia.     IMPRESSION  IMPRESSION:  Probable perforated appendicitis   Incidental nonobstructing right renal calculi. Right basilar subsegmental atelectasis  A right hydroureter secondary to a 5 mm calculus at the pelvic inlet   I have reviewed and agree with all of the pertinent images    Assessment:     Monse Chavez is a 72 y.o. male with perforated appendicitis and R nephrolithiais, renal failure, sepsis and dehydration    Recommendations:     1. Appendicitis - I reviewed the CT with Dr Albertina Aragon on call for IR and says the fluid collection is only about 1cm is too small to be drained. He will be on IV abx for now and hold off on any operation at this point with a perforation. This should improve with time and IV abx. 2. Nephrolithiasis - urology consulted for R hydroureter. He does have some renal failure eitehr due to kidney stone or sepsis    3. Renal failure dehydration, acute kidney injury - aggressive IVF resuscitation and albumin today, repeat Cr tomorrow and Lactic acid    4. Sepsis - the patient has sepsis with fever, tachycardia and elevated WBC    5. Awilda Guerrero MD    Mr. Fransico Kerr has a reminder for a \"due or due soon\" health maintenance. I have asked that he contact his primary care provider for follow-up on this health maintenance.

## 2019-06-14 NOTE — ED NOTES
Verbal report and paperwork given to [facility]/Critical Care Providers; time allotted for questions but denied having any at this time.

## 2019-06-14 NOTE — ED NOTES
TRANSFER - OUT REPORT:    Verbal report given to Radha Villegas RN (name) on Arturo Bailey  being transferred to ICU 7 (unit) for routine progression of care. Report consisted of patients Situation, Background, Assessment and Recommendations(SBAR). Information from the following report(s) SBAR, ED Summary, MAR, Recent Results, Med Rec Status and Cardiac Rhythm ST was reviewed with the receiving nurse. Lines:   Peripheral IV 06/14/19 Left Wrist (Active)   Site Assessment Clean, dry, & intact 6/14/2019  1:22 PM   Phlebitis Assessment 0 6/14/2019  1:22 PM   Infiltration Assessment 0 6/14/2019  1:22 PM   Dressing Status Clean, dry, & intact 6/14/2019  1:22 PM   Dressing Type Transparent;Tape 6/14/2019  1:22 PM   Hub Color/Line Status Pink;Flushed 6/14/2019  1:22 PM   Action Taken Blood drawn 6/14/2019  1:22 PM       Peripheral IV 06/14/19 Right Antecubital (Active)   Site Assessment Clean, dry, & intact 6/14/2019  1:30 PM   Phlebitis Assessment 0 6/14/2019  1:30 PM   Infiltration Assessment 0 6/14/2019  1:30 PM   Dressing Status Clean, dry, & intact 6/14/2019  1:30 PM   Dressing Type Tape;Transparent 6/14/2019  1:30 PM   Hub Color/Line Status Green;Flushed 6/14/2019  1:30 PM   Action Taken Blood drawn 6/14/2019  1:30 PM        Opportunity for questions and clarification was provided.       Patient transported with:  Monitor

## 2019-06-14 NOTE — PROGRESS NOTES
Radiology  Reviewed CT from [facility] ED. Findings of appendicitis. Small no contained fluid adjacent to caudal tip of the cecum measures 1.6 cm in max thickness and is too small to reasonably attempt for drainage. No associated gas.

## 2019-06-14 NOTE — ED PROVIDER NOTES
72 y.o. Male with hx of dementia, HTN, presents to the ED with his family members stating that over the last 2 days he has had worsening mental status. He is normally alert and communicative but baseline confused, but has been significantly less talkative and interactive. He reportedly seemed confused 2 days ago, tried to stand up unassisted and then slid down ouf of a chair on to his rear end on the floor. No head injury during this minor GLF. No LOC. He was thought to have no significant injury after this happened. Since then he has developed intermittent nonbloody vomiting x 3, and had a fever of 101 yesterday evening. He reportedly had normal nonbloody BM yesterday without diarrhea. He was noted to have a tender abdomen today. No hx of prior abd surgeries. No medications given for his sx.             Past Medical History:   Diagnosis Date    Anxiety disorder     Depression     Essential hypertension     Memory disorder     Neurological disorder        Past Surgical History:   Procedure Laterality Date    HX OTHER SURGICAL      left big toe nail removed    HX VASECTOMY  1975         Family History:   Problem Relation Age of Onset    Dementia Mother     Heart Disease Father        Social History     Socioeconomic History    Marital status:      Spouse name: Not on file    Number of children: Not on file    Years of education: Not on file    Highest education level: Not on file   Occupational History    Not on file   Social Needs    Financial resource strain: Not on file    Food insecurity:     Worry: Not on file     Inability: Not on file    Transportation needs:     Medical: Not on file     Non-medical: Not on file   Tobacco Use    Smoking status: Never Smoker    Smokeless tobacco: Never Used   Substance and Sexual Activity    Alcohol use: No    Drug use: No    Sexual activity: Not on file   Lifestyle    Physical activity:     Days per week: Not on file     Minutes per session: Not on file    Stress: Not on file   Relationships    Social connections:     Talks on phone: Not on file     Gets together: Not on file     Attends [employer] service: Not on file     Active member of club or organization: Not on file     Attends meetings of clubs or organizations: Not on file     Relationship status: Not on file    Intimate partner violence:     Fear of current or ex partner: Not on file     Emotionally abused: Not on file     Physically abused: Not on file     Forced sexual activity: Not on file   Other Topics Concern    Not on file   Social History Narrative    Not on file         ALLERGIES: Patient has no known allergies. Review of Systems   Unable to perform ROS: Dementia       Vitals:    06/14/19 1313   BP: 102/69   Pulse: (!) 109   Resp: 20   Temp: 98.3 °F (36.8 °C)   SpO2: 98%   Weight: 110.6 kg (243 lb 13.3 oz)   Height: 5' 11\" (1.803 m)            Physical Exam   Constitutional: He is oriented to person, place, and time. He appears well-developed. He appears ill. No distress. confused   HENT:   Head: Normocephalic and atraumatic. Nose: Nose normal.   MM dry, oropharynx clear   Eyes: Pupils are equal, round, and reactive to light. Conjunctivae and EOM are normal.   Neck: Normal range of motion. Neck supple. Cardiovascular: Normal rate, regular rhythm, normal heart sounds and intact distal pulses. Pulmonary/Chest: Effort normal and breath sounds normal.   Abdominal: Soft. Normal appearance. He exhibits no distension. There is generalized tenderness. There is rigidity, guarding and tenderness at McBurney's point. There is no rebound and no CVA tenderness. Musculoskeletal: He exhibits no edema or tenderness. Neurological: He is alert and oriented to person, place, and time. He has normal strength. No cranial nerve deficit or sensory deficit. Coordination normal. GCS eye subscore is 4. GCS verbal subscore is 5. GCS motor subscore is 6. Skin: Skin is warm and dry.  He is not diaphoretic. Nursing note and vitals reviewed. MDM    72 y.o. male presents with gen abd pain, and concern for sepsis. Peritoneal abdomen. Given IVF resuscitation and dose of zosyn IV. Sara Walker Given dose of IV fentanyl for pain. Labs show lactate 3.2, WBC 19, Cr 2.98, BUN 38, UA shows nitrite positive, with small blood, 1+ bacteria. CXR viewed by myself and read by radiology showing central congestion without overt CHF. Right hip shows calcific bursitis but no other acute abnormalities. CT head neg. CT abdplv shows perforated appendicitis as well as a 5mm ureteral stone at the pelvic inlet. CT lumbar shows no acute fracture   Procedures    Total critical care time spent exclusive of procedures:  40 minutes    1317 EKG shows sinus tachycardia with a rate of 107 bpm with nonspecific T wave flattening inferolaterally with no CHEPE or STD    2:55pm Dr. Taras Méndez general surgery who accepted the patient for admission and possible perc drain placement vs operative mgmt.

## 2019-06-14 NOTE — PROGRESS NOTES
Day #1 of Levofloxacin   Indication:  Intra-abdominal infection   Current regimen:  500 mg q24h  Abx regimen: Levofloxacin+Metronidazole   Recent Labs     19  1322   WBC 19.5*   CREA 2.98*   BUN 38*     Est CrCl: ~25 ml/min;    Temp (24hrs), Av.7 °F (37.1 °C), Min:98 °F (36.7 °C), Max:99.1 °F (37.3 °C)    Cultures:   Urine - pending  Blood- pending     Plan: Change to 500 mg q48h per renal adjustment protocol      Abdias Young, PharmD

## 2019-06-14 NOTE — ED NOTES
Pt transported out of ED via [facility] stretcher. Vitals updated. Pt resting comfortably. Grandson to ride with pt.

## 2019-06-14 NOTE — ED TRIAGE NOTES
Triage: pt c/o nausea and fatigue starting yesterday. x2 episodes of vomiting yesterday evening and this AM. Caretaker reports \"bowel\" in emesis. . Pt's baseline mentation is alert and able to communicate but has confusion. Tempeture last night of 100.0. +abd pain.

## 2019-06-15 ENCOUNTER — APPOINTMENT (OUTPATIENT)
Dept: GENERAL RADIOLOGY | Age: 74
DRG: 853 | End: 2019-06-15
Attending: SURGERY
Payer: MEDICARE

## 2019-06-15 ENCOUNTER — ANESTHESIA EVENT (OUTPATIENT)
Dept: SURGERY | Age: 74
DRG: 853 | End: 2019-06-15
Payer: MEDICARE

## 2019-06-15 ENCOUNTER — ANESTHESIA (OUTPATIENT)
Dept: SURGERY | Age: 74
DRG: 853 | End: 2019-06-15
Payer: MEDICARE

## 2019-06-15 LAB
ALBUMIN SERPL-MCNC: 2.9 G/DL (ref 3.5–5)
ALBUMIN/GLOB SERPL: 0.9 {RATIO} (ref 1.1–2.2)
ALP SERPL-CCNC: 50 U/L (ref 45–117)
ALT SERPL-CCNC: 7 U/L (ref 12–78)
ANION GAP SERPL CALC-SCNC: 6 MMOL/L (ref 5–15)
AST SERPL-CCNC: 13 U/L (ref 15–37)
BASOPHILS # BLD: 0 K/UL (ref 0–0.1)
BASOPHILS NFR BLD: 0 % (ref 0–1)
BILIRUB SERPL-MCNC: 0.5 MG/DL (ref 0.2–1)
BUN SERPL-MCNC: 35 MG/DL (ref 6–20)
BUN/CREAT SERPL: 22 (ref 12–20)
CALCIUM SERPL-MCNC: 8.2 MG/DL (ref 8.5–10.1)
CHLORIDE SERPL-SCNC: 114 MMOL/L (ref 97–108)
CO2 SERPL-SCNC: 26 MMOL/L (ref 21–32)
CREAT SERPL-MCNC: 1.58 MG/DL (ref 0.7–1.3)
DIFFERENTIAL METHOD BLD: ABNORMAL
EOSINOPHIL # BLD: 0 K/UL (ref 0–0.4)
EOSINOPHIL NFR BLD: 0 % (ref 0–7)
ERYTHROCYTE [DISTWIDTH] IN BLOOD BY AUTOMATED COUNT: 13.7 % (ref 11.5–14.5)
ERYTHROCYTE [DISTWIDTH] IN BLOOD BY AUTOMATED COUNT: 13.8 % (ref 11.5–14.5)
GLOBULIN SER CALC-MCNC: 3.4 G/DL (ref 2–4)
GLUCOSE SERPL-MCNC: 109 MG/DL (ref 65–100)
HCT VFR BLD AUTO: 33.7 % (ref 36.6–50.3)
HCT VFR BLD AUTO: 34.1 % (ref 36.6–50.3)
HGB BLD-MCNC: 10.7 G/DL (ref 12.1–17)
HGB BLD-MCNC: 10.7 G/DL (ref 12.1–17)
IMM GRANULOCYTES # BLD AUTO: 0 K/UL
IMM GRANULOCYTES NFR BLD AUTO: 0 %
LACTATE SERPL-SCNC: 1.1 MMOL/L (ref 0.4–2)
LYMPHOCYTES # BLD: 0.6 K/UL (ref 0.8–3.5)
LYMPHOCYTES NFR BLD: 4 % (ref 12–49)
MCH RBC QN AUTO: 30.4 PG (ref 26–34)
MCH RBC QN AUTO: 30.6 PG (ref 26–34)
MCHC RBC AUTO-ENTMCNC: 31.4 G/DL (ref 30–36.5)
MCHC RBC AUTO-ENTMCNC: 31.8 G/DL (ref 30–36.5)
MCV RBC AUTO: 96.3 FL (ref 80–99)
MCV RBC AUTO: 96.9 FL (ref 80–99)
MONOCYTES # BLD: 0.3 K/UL (ref 0–1)
MONOCYTES NFR BLD: 2 % (ref 5–13)
NEUTS BAND NFR BLD MANUAL: 8 % (ref 0–6)
NEUTS SEG # BLD: 14 K/UL (ref 1.8–8)
NEUTS SEG NFR BLD: 86 % (ref 32–75)
NRBC # BLD: 0 K/UL (ref 0–0.01)
NRBC # BLD: 0 K/UL (ref 0–0.01)
NRBC BLD-RTO: 0 PER 100 WBC
NRBC BLD-RTO: 0 PER 100 WBC
PLATELET # BLD AUTO: 181 K/UL (ref 150–400)
PLATELET # BLD AUTO: 197 K/UL (ref 150–400)
PMV BLD AUTO: 10.8 FL (ref 8.9–12.9)
PMV BLD AUTO: 11.2 FL (ref 8.9–12.9)
POTASSIUM SERPL-SCNC: 4.4 MMOL/L (ref 3.5–5.1)
PROT SERPL-MCNC: 6.3 G/DL (ref 6.4–8.2)
RBC # BLD AUTO: 3.5 M/UL (ref 4.1–5.7)
RBC # BLD AUTO: 3.52 M/UL (ref 4.1–5.7)
RBC MORPH BLD: ABNORMAL
SODIUM SERPL-SCNC: 146 MMOL/L (ref 136–145)
WBC # BLD AUTO: 14.5 K/UL (ref 4.1–11.1)
WBC # BLD AUTO: 14.9 K/UL (ref 4.1–11.1)

## 2019-06-15 PROCEDURE — 76210000016 HC OR PH I REC 1 TO 1.5 HR: Performed by: SURGERY

## 2019-06-15 PROCEDURE — 77030012407 HC DRN WND BARD -B: Performed by: SURGERY

## 2019-06-15 PROCEDURE — 77030020263 HC SOL INJ SOD CL0.9% LFCR 1000ML: Performed by: SURGERY

## 2019-06-15 PROCEDURE — 77030002933 HC SUT MCRYL J&J -A: Performed by: SURGERY

## 2019-06-15 PROCEDURE — 77030035045 HC TRCR ENDOSC VRSPRT BLDLSS COVD -B: Performed by: SURGERY

## 2019-06-15 PROCEDURE — 77030026438 HC STYL ET INTUB CARD -A: Performed by: NURSE ANESTHETIST, CERTIFIED REGISTERED

## 2019-06-15 PROCEDURE — 94640 AIRWAY INHALATION TREATMENT: CPT

## 2019-06-15 PROCEDURE — 77030020053 HC ELECTRD LAPSCP COVD -B: Performed by: SURGERY

## 2019-06-15 PROCEDURE — 77030034849: Performed by: SURGERY

## 2019-06-15 PROCEDURE — 85025 COMPLETE CBC W/AUTO DIFF WBC: CPT

## 2019-06-15 PROCEDURE — 77030008756 HC TU IRR SUC STRY -B: Performed by: SURGERY

## 2019-06-15 PROCEDURE — 80053 COMPREHEN METABOLIC PANEL: CPT

## 2019-06-15 PROCEDURE — 76010000131 HC OR TIME 2 TO 2.5 HR: Performed by: SURGERY

## 2019-06-15 PROCEDURE — 77030002996 HC SUT SLK J&J -A: Performed by: SURGERY

## 2019-06-15 PROCEDURE — 83605 ASSAY OF LACTIC ACID: CPT

## 2019-06-15 PROCEDURE — 77030008684 HC TU ET CUF COVD -B: Performed by: NURSE ANESTHETIST, CERTIFIED REGISTERED

## 2019-06-15 PROCEDURE — 77030037366 HC STPLR ENDO TRI-STPLR COVD -C: Performed by: SURGERY

## 2019-06-15 PROCEDURE — 0T768DZ DILATION OF RIGHT URETER WITH INTRALUMINAL DEVICE, VIA NATURAL OR ARTIFICIAL OPENING ENDOSCOPIC: ICD-10-PCS | Performed by: UROLOGY

## 2019-06-15 PROCEDURE — 77030032230 HC DSCTR ULTSONC CRDLSS COVD -E: Performed by: SURGERY

## 2019-06-15 PROCEDURE — 77030013567 HC DRN WND RESERV BARD -A: Performed by: SURGERY

## 2019-06-15 PROCEDURE — 77030035048 HC TRCR ENDOSC OPTCL COVD -B: Performed by: SURGERY

## 2019-06-15 PROCEDURE — 36415 COLL VENOUS BLD VENIPUNCTURE: CPT

## 2019-06-15 PROCEDURE — C1769 GUIDE WIRE: HCPCS | Performed by: SURGERY

## 2019-06-15 PROCEDURE — 0DTJ4ZZ RESECTION OF APPENDIX, PERCUTANEOUS ENDOSCOPIC APPROACH: ICD-10-PCS | Performed by: SURGERY

## 2019-06-15 PROCEDURE — 77030035051: Performed by: SURGERY

## 2019-06-15 PROCEDURE — 77030011640 HC PAD GRND REM COVD -A: Performed by: SURGERY

## 2019-06-15 PROCEDURE — 77030002895 HC DEV VASC CLOSR COVD -B: Performed by: SURGERY

## 2019-06-15 PROCEDURE — C2617 STENT, NON-COR, TEM W/O DEL: HCPCS | Performed by: SURGERY

## 2019-06-15 PROCEDURE — 77030031139 HC SUT VCRL2 J&J -A: Performed by: SURGERY

## 2019-06-15 PROCEDURE — 77030016151 HC PROTCTR LNS DFOG COVD -B: Performed by: SURGERY

## 2019-06-15 PROCEDURE — 76060000036 HC ANESTHESIA 2.5 TO 3 HR: Performed by: SURGERY

## 2019-06-15 PROCEDURE — 74011250636 HC RX REV CODE- 250/636: Performed by: SURGERY

## 2019-06-15 PROCEDURE — 77030022473 HC HNDL ENDO GIA UNIV USDA -C: Performed by: SURGERY

## 2019-06-15 PROCEDURE — 85027 COMPLETE CBC AUTOMATED: CPT

## 2019-06-15 PROCEDURE — 77030020747 HC TU INSUF ENDOSC TELE -A: Performed by: SURGERY

## 2019-06-15 PROCEDURE — 77030002916 HC SUT ETHLN J&J -A: Performed by: SURGERY

## 2019-06-15 PROCEDURE — 77030032490 HC SLV COMPR SCD KNE COVD -B: Performed by: SURGERY

## 2019-06-15 PROCEDURE — 74011250637 HC RX REV CODE- 250/637: Performed by: SURGERY

## 2019-06-15 PROCEDURE — 77030037032 HC INSRT SCIS CLICKLLINE DISP STOR -B: Performed by: SURGERY

## 2019-06-15 PROCEDURE — 77030039266 HC ADH SKN EXOFIN S2SG -A: Performed by: SURGERY

## 2019-06-15 PROCEDURE — 77030013079 HC BLNKT BAIR HGGR 3M -A: Performed by: NURSE ANESTHETIST, CERTIFIED REGISTERED

## 2019-06-15 PROCEDURE — 74018 RADEX ABDOMEN 1 VIEW: CPT

## 2019-06-15 PROCEDURE — 65610000006 HC RM INTENSIVE CARE

## 2019-06-15 PROCEDURE — 74011250636 HC RX REV CODE- 250/636

## 2019-06-15 PROCEDURE — 74022 RADEX COMPL AQT ABD SERIES: CPT

## 2019-06-15 PROCEDURE — 74011000250 HC RX REV CODE- 250: Performed by: SURGERY

## 2019-06-15 PROCEDURE — 74011000258 HC RX REV CODE- 258: Performed by: SURGERY

## 2019-06-15 RX ORDER — MIDAZOLAM HYDROCHLORIDE 1 MG/ML
1 INJECTION, SOLUTION INTRAMUSCULAR; INTRAVENOUS AS NEEDED
Status: DISCONTINUED | OUTPATIENT
Start: 2019-06-15 | End: 2019-06-16 | Stop reason: HOSPADM

## 2019-06-15 RX ORDER — HYDROMORPHONE HYDROCHLORIDE 1 MG/ML
1 INJECTION, SOLUTION INTRAMUSCULAR; INTRAVENOUS; SUBCUTANEOUS ONCE
Status: COMPLETED | OUTPATIENT
Start: 2019-06-15 | End: 2019-06-15

## 2019-06-15 RX ORDER — POTASSIUM CHLORIDE AND SODIUM CHLORIDE 450; 150 MG/100ML; MG/100ML
INJECTION, SOLUTION INTRAVENOUS CONTINUOUS
Status: DISCONTINUED | OUTPATIENT
Start: 2019-06-15 | End: 2019-06-22 | Stop reason: HOSPADM

## 2019-06-15 RX ORDER — SODIUM CHLORIDE, SODIUM LACTATE, POTASSIUM CHLORIDE, CALCIUM CHLORIDE 600; 310; 30; 20 MG/100ML; MG/100ML; MG/100ML; MG/100ML
100 INJECTION, SOLUTION INTRAVENOUS CONTINUOUS
Status: DISCONTINUED | OUTPATIENT
Start: 2019-06-16 | End: 2019-06-16

## 2019-06-15 RX ORDER — ACETAMINOPHEN 650 MG/1
650 SUPPOSITORY RECTAL
Status: DISCONTINUED | OUTPATIENT
Start: 2019-06-15 | End: 2019-06-16

## 2019-06-15 RX ORDER — VANCOMYCIN HYDROCHLORIDE
1250
Status: DISCONTINUED | OUTPATIENT
Start: 2019-06-16 | End: 2019-06-16

## 2019-06-15 RX ORDER — VANCOMYCIN 2 GRAM/500 ML IN 0.9 % SODIUM CHLORIDE INTRAVENOUS
2000 ONCE
Status: COMPLETED | OUTPATIENT
Start: 2019-06-15 | End: 2019-06-15

## 2019-06-15 RX ORDER — FUROSEMIDE 10 MG/ML
20 INJECTION INTRAMUSCULAR; INTRAVENOUS ONCE
Status: COMPLETED | OUTPATIENT
Start: 2019-06-15 | End: 2019-06-15

## 2019-06-15 RX ORDER — SODIUM CHLORIDE 0.9 % (FLUSH) 0.9 %
5-40 SYRINGE (ML) INJECTION AS NEEDED
Status: DISCONTINUED | OUTPATIENT
Start: 2019-06-15 | End: 2019-06-16 | Stop reason: HOSPADM

## 2019-06-15 RX ORDER — ACETAMINOPHEN 325 MG/1
650 TABLET ORAL ONCE
Status: DISCONTINUED | OUTPATIENT
Start: 2019-06-16 | End: 2019-06-16 | Stop reason: HOSPADM

## 2019-06-15 RX ORDER — ROCURONIUM BROMIDE 10 MG/ML
INJECTION, SOLUTION INTRAVENOUS AS NEEDED
Status: DISCONTINUED | OUTPATIENT
Start: 2019-06-15 | End: 2019-06-16 | Stop reason: HOSPADM

## 2019-06-15 RX ORDER — LIDOCAINE HYDROCHLORIDE 10 MG/ML
0.1 INJECTION, SOLUTION EPIDURAL; INFILTRATION; INTRACAUDAL; PERINEURAL AS NEEDED
Status: DISCONTINUED | OUTPATIENT
Start: 2019-06-15 | End: 2019-06-16 | Stop reason: HOSPADM

## 2019-06-15 RX ORDER — ROPIVACAINE HYDROCHLORIDE 5 MG/ML
30 INJECTION, SOLUTION EPIDURAL; INFILTRATION; PERINEURAL AS NEEDED
Status: DISCONTINUED | OUTPATIENT
Start: 2019-06-15 | End: 2019-06-16 | Stop reason: HOSPADM

## 2019-06-15 RX ORDER — IPRATROPIUM BROMIDE AND ALBUTEROL SULFATE 2.5; .5 MG/3ML; MG/3ML
3 SOLUTION RESPIRATORY (INHALATION)
Status: DISCONTINUED | OUTPATIENT
Start: 2019-06-15 | End: 2019-06-20

## 2019-06-15 RX ORDER — SODIUM CHLORIDE 0.9 % (FLUSH) 0.9 %
5-40 SYRINGE (ML) INJECTION EVERY 8 HOURS
Status: DISCONTINUED | OUTPATIENT
Start: 2019-06-16 | End: 2019-06-16 | Stop reason: HOSPADM

## 2019-06-15 RX ORDER — FENTANYL CITRATE 50 UG/ML
50 INJECTION, SOLUTION INTRAMUSCULAR; INTRAVENOUS AS NEEDED
Status: DISCONTINUED | OUTPATIENT
Start: 2019-06-15 | End: 2019-06-16 | Stop reason: HOSPADM

## 2019-06-15 RX ORDER — SODIUM CHLORIDE 9 MG/ML
25 INJECTION, SOLUTION INTRAVENOUS CONTINUOUS
Status: DISCONTINUED | OUTPATIENT
Start: 2019-06-16 | End: 2019-06-16

## 2019-06-15 RX ORDER — ACETAMINOPHEN 325 MG/1
650 TABLET ORAL
Status: DISCONTINUED | OUTPATIENT
Start: 2019-06-15 | End: 2019-06-16

## 2019-06-15 RX ADMIN — SERTRALINE HYDROCHLORIDE 150 MG: 50 TABLET ORAL at 08:39

## 2019-06-15 RX ADMIN — Medication 10 ML: at 22:55

## 2019-06-15 RX ADMIN — METRONIDAZOLE 500 MG: 500 INJECTION, SOLUTION INTRAVENOUS at 05:34

## 2019-06-15 RX ADMIN — DONEPEZIL HYDROCHLORIDE 10 MG: 10 TABLET, FILM COATED ORAL at 08:39

## 2019-06-15 RX ADMIN — Medication 120 MCG: at 23:41

## 2019-06-15 RX ADMIN — FUROSEMIDE 20 MG: 10 INJECTION, SOLUTION INTRAMUSCULAR; INTRAVENOUS at 13:49

## 2019-06-15 RX ADMIN — PIPERACILLIN AND TAZOBACTAM 3.38 G: 3; .375 INJECTION, POWDER, FOR SOLUTION INTRAVENOUS at 13:49

## 2019-06-15 RX ADMIN — MEMANTINE HYDROCHLORIDE 10 MG: 10 TABLET ORAL at 08:40

## 2019-06-15 RX ADMIN — ACETAMINOPHEN 650 MG: 325 TABLET ORAL at 18:22

## 2019-06-15 RX ADMIN — LIDOCAINE HYDROCHLORIDE 80 MG: 20 INJECTION, SOLUTION EPIDURAL; INFILTRATION; INTRACAUDAL; PERINEURAL at 23:41

## 2019-06-15 RX ADMIN — HYDROMORPHONE HYDROCHLORIDE 1 MG: 1 INJECTION, SOLUTION INTRAMUSCULAR; INTRAVENOUS; SUBCUTANEOUS at 05:34

## 2019-06-15 RX ADMIN — HYDROMORPHONE HYDROCHLORIDE 1 MG: 1 INJECTION, SOLUTION INTRAMUSCULAR; INTRAVENOUS; SUBCUTANEOUS at 17:00

## 2019-06-15 RX ADMIN — TAMSULOSIN HYDROCHLORIDE 0.4 MG: 0.4 CAPSULE ORAL at 08:40

## 2019-06-15 RX ADMIN — Medication 120 MCG: at 23:44

## 2019-06-15 RX ADMIN — IPRATROPIUM BROMIDE AND ALBUTEROL SULFATE 3 ML: .5; 3 SOLUTION RESPIRATORY (INHALATION) at 19:16

## 2019-06-15 RX ADMIN — CARBIDOPA AND LEVODOPA 0.5 TABLET: 25; 100 TABLET ORAL at 08:40

## 2019-06-15 RX ADMIN — SODIUM CHLORIDE, SODIUM LACTATE, POTASSIUM CHLORIDE, AND CALCIUM CHLORIDE: 600; 310; 30; 20 INJECTION, SOLUTION INTRAVENOUS at 23:35

## 2019-06-15 RX ADMIN — PIPERACILLIN AND TAZOBACTAM 3.38 G: 3; .375 INJECTION, POWDER, FOR SOLUTION INTRAVENOUS at 22:54

## 2019-06-15 RX ADMIN — CARBIDOPA AND LEVODOPA 0.5 TABLET: 25; 100 TABLET ORAL at 15:11

## 2019-06-15 RX ADMIN — HYDROMORPHONE HYDROCHLORIDE 1 MG: 1 INJECTION, SOLUTION INTRAMUSCULAR; INTRAVENOUS; SUBCUTANEOUS at 13:58

## 2019-06-15 RX ADMIN — HYDROMORPHONE HYDROCHLORIDE 1 MG: 1 INJECTION, SOLUTION INTRAMUSCULAR; INTRAVENOUS; SUBCUTANEOUS at 00:30

## 2019-06-15 RX ADMIN — HYDROMORPHONE HYDROCHLORIDE 1 MG: 1 INJECTION, SOLUTION INTRAMUSCULAR; INTRAVENOUS; SUBCUTANEOUS at 09:59

## 2019-06-15 RX ADMIN — POTASSIUM CHLORIDE AND SODIUM CHLORIDE: 450; 150 INJECTION, SOLUTION INTRAVENOUS at 09:41

## 2019-06-15 RX ADMIN — PROPOFOL 125 MG: 10 INJECTION, EMULSION INTRAVENOUS at 23:41

## 2019-06-15 RX ADMIN — ALBUMIN HUMAN 250 ML: 50 SOLUTION INTRAVENOUS at 23:57

## 2019-06-15 RX ADMIN — MEMANTINE HYDROCHLORIDE 10 MG: 10 TABLET ORAL at 17:00

## 2019-06-15 RX ADMIN — ACETAMINOPHEN 650 MG: 325 TABLET ORAL at 13:14

## 2019-06-15 RX ADMIN — Medication 30 ML: at 13:14

## 2019-06-15 RX ADMIN — Medication 10 ML: at 05:34

## 2019-06-15 RX ADMIN — DIVALPROEX SODIUM 500 MG: 500 TABLET, EXTENDED RELEASE ORAL at 08:39

## 2019-06-15 RX ADMIN — HYDROMORPHONE HYDROCHLORIDE 1 MG: 1 INJECTION, SOLUTION INTRAMUSCULAR; INTRAVENOUS; SUBCUTANEOUS at 18:23

## 2019-06-15 RX ADMIN — SUCCINYLCHOLINE CHLORIDE 160 MG: 20 INJECTION INTRAMUSCULAR; INTRAVENOUS at 23:41

## 2019-06-15 RX ADMIN — ZONISAMIDE 100 MG: 100 CAPSULE ORAL at 08:40

## 2019-06-15 RX ADMIN — ROCURONIUM BROMIDE 30 MG: 10 INJECTION, SOLUTION INTRAVENOUS at 23:48

## 2019-06-15 RX ADMIN — IPRATROPIUM BROMIDE AND ALBUTEROL SULFATE 3 ML: .5; 3 SOLUTION RESPIRATORY (INHALATION) at 15:21

## 2019-06-15 RX ADMIN — VANCOMYCIN HYDROCHLORIDE 2000 MG: 10 INJECTION, POWDER, LYOPHILIZED, FOR SOLUTION INTRAVENOUS at 13:50

## 2019-06-15 NOTE — PROGRESS NOTES
Pt arrived on unit via transport; VS stable; pt alert to self; moves all extremities; family at bedside; MD Kristen Vasquez paged to notify of arrival

## 2019-06-15 NOTE — CONSULTS
Consult    Patient: Renay Daugherty MRN: [id number]  SSN: [id number]    YOB: 1945  Age: 72 y.o. Sex: male      Subjective:      Renay Daugherty is a 72 y.o. male who is being seen for right ureteral stone. Location- right ureter  Timing- constant  Duration- unknown but seen on CT from yesterday  Severity- mild 5 mm stone  Associations- no flank pain  Modifying factors- none identified  Context- admitted for AMS and abd pain. CT shows perforated appendix and incidental right ureteral stone. I viewed CT and do not appreciate significant hydro despite radiology report. Receiving abx and will be scheduled for appendectomy in next few days.        Past Medical History:   Diagnosis Date    Anxiety disorder     Depression     Essential hypertension     Memory disorder     Neurological disorder      Past Surgical History:   Procedure Laterality Date    HX OTHER SURGICAL      left big toe nail removed    HX VASECTOMY  1975      Family History   Problem Relation Age of Onset    Dementia Mother     Heart Disease Father      Social History     Tobacco Use    Smoking status: Never Smoker    Smokeless tobacco: Never Used   Substance Use Topics    Alcohol use: No      Current Facility-Administered Medications   Medication Dose Route Frequency Provider Last Rate Last Dose    0.45% sodium chloride with KCl 20 mEq/L infusion   IntraVENous CONTINUOUS Lisa Lowe  mL/hr at 06/15/19 1349      acetaminophen (TYLENOL) suppository 650 mg  650 mg Rectal Q4H PRN Lisa Lowe MD        acetaminophen (TYLENOL) tablet 650 mg  650 mg Oral Q4H PRN Lisa Lowe MD   650 mg at 06/15/19 1314    piperacillin-tazobactam (ZOSYN) 3.375 g in 0.9% sodium chloride (MBP/ADV) 100 mL  3.375 g IntraVENous Q8H Lisa Lowe MD 25 mL/hr at 06/15/19 1349 3.375 g at 06/15/19 1349    albuterol-ipratropium (DUO-NEB) 2.5 MG-0.5 MG/3 ML  3 mL Nebulization Q4H RT Lisa Lowe MD   3 mL at 06/15/19 1521    carbidopa-levodopa (SINEMET)  mg per tablet 0.5 Tab  0.5 Tab Oral TID Benji Tyler MD   0.5 Tab at 06/15/19 1511    tamsulosin (FLOMAX) capsule 0.4 mg  0.4 mg Oral DAILY Benji Tyler MD   0.4 mg at 06/15/19 0840    sertraline (ZOLOFT) tablet 150 mg  150 mg Oral DAILY Benji Tyler MD   150 mg at 06/15/19 5030    zonisamide (ZONEGRAN) capsule 100 mg  100 mg Oral DAILY Benji Tyler MD   100 mg at 06/15/19 0840    sodium chloride (NS) flush 5-40 mL  5-40 mL IntraVENous Q8H Benji Tyler MD   30 mL at 06/15/19 1314    sodium chloride (NS) flush 5-40 mL  5-40 mL IntraVENous PRN Benji Tyler MD        HYDROmorphone (PF) (DILAUDID) injection 1 mg  1 mg IntraVENous Q4H PRN Benji Tyler MD   1 mg at 06/15/19 1358    naloxone (NARCAN) injection 0.4 mg  0.4 mg IntraVENous PRN Benji Tyler MD        ondansetron [facility]) injection 4 mg  4 mg IntraVENous Q4H PRN Benji Tyler MD        diphenhydrAMINE (BENADRYL) injection 12.5 mg  12.5 mg IntraVENous ONCE PRN Benji Tyler MD        lactated Ringers infusion  150 mL/hr IntraVENous CONTINUOUS Benji Tyler  mL/hr at 06/14/19 1722 150 mL/hr at 06/14/19 1722    divalproex ER (DEPAKOTE ER) 24 hour tablet 500 mg  500 mg Oral DAILY Benji Tyler MD   500 mg at 06/15/19 0839    memantine (NAMENDA) tablet 10 mg  10 mg Oral BID Benji Tyler MD   10 mg at 06/15/19 0840    donepezil (ARICEPT) tablet 10 mg  10 mg Oral DAILY Benji Tyler MD   10 mg at 06/15/19 1775        No Known Allergies    Review of Systems:  Review of systems not obtained due to patient factors.  Altered mental status    Objective:     Vitals:    06/15/19 1300 06/15/19 1400 06/15/19 1500 06/15/19 1522   BP: 119/82 (!) 148/96 113/79    Pulse: 95 (!) 113 (!) 101    Resp: 16 (!) 35 17    Temp:       SpO2: 97% 91% 92% 94%   Weight:       Height:            Physical Exam:  GENERAL: alert, no distress, appears stated age, confused, disoriented  EYE: negative  THROAT & NECK: normal  LUNG: no overt signs of COPD  HEART: no overt signs of CHF  ABDOMEN: obese  EXTREMITIES:  extremities normal, atraumatic, no cyanosis or edema  SKIN: Normal.  NEUROLOGIC: positive findings: disoriented, confused and delirious  PSYCHIATRIC: delirious     Recent Results (from the past 24 hour(s))   METABOLIC PANEL, COMPREHENSIVE    Collection Time: 06/15/19  4:23 AM   Result Value Ref Range    Sodium 146 (H) 136 - 145 mmol/L    Potassium 4.4 3.5 - 5.1 mmol/L    Chloride 114 (H) 97 - 108 mmol/L    CO2 26 21 - 32 mmol/L    Anion gap 6 5 - 15 mmol/L    Glucose 109 (H) 65 - 100 mg/dL    BUN 35 (H) 6 - 20 MG/DL    Creatinine 1.58 (H) 0.70 - 1.30 MG/DL    BUN/Creatinine ratio 22 (H) 12 - 20      GFR est AA 53 (L) >60 ml/min/1.73m2    GFR est non-AA 43 (L) >60 ml/min/1.73m2    Calcium 8.2 (L) 8.5 - 10.1 MG/DL    Bilirubin, total 0.5 0.2 - 1.0 MG/DL    ALT (SGPT) 7 (L) 12 - 78 U/L    AST (SGOT) 13 (L) 15 - 37 U/L    Alk.  phosphatase 50 45 - 117 U/L    Protein, total 6.3 (L) 6.4 - 8.2 g/dL    Albumin 2.9 (L) 3.5 - 5.0 g/dL    Globulin 3.4 2.0 - 4.0 g/dL    A-G Ratio 0.9 (L) 1.1 - 2.2     CBC W/O DIFF    Collection Time: 06/15/19  4:23 AM   Result Value Ref Range    WBC 14.5 (H) 4.1 - 11.1 K/uL    RBC 3.52 (L) 4.10 - 5.70 M/uL    HGB 10.7 (L) 12.1 - 17.0 g/dL    HCT 34.1 (L) 36.6 - 50.3 %    MCV 96.9 80.0 - 99.0 FL    MCH 30.4 26.0 - 34.0 PG    MCHC 31.4 30.0 - 36.5 g/dL    RDW 13.7 11.5 - 14.5 %    PLATELET 084 839 - 340 K/uL    MPV 11.2 8.9 - 12.9 FL    NRBC 0.0 0  WBC    ABSOLUTE NRBC 0.00 0.00 - 0.01 K/uL   LACTIC ACID    Collection Time: 06/15/19  4:23 AM   Result Value Ref Range    Lactic acid 1.1 0.4 - 2.0 MMOL/L   CBC WITH AUTOMATED DIFF    Collection Time: 06/15/19 11:37 AM   Result Value Ref Range    WBC 14.9 (H) 4.1 - 11.1 K/uL    RBC 3.50 (L) 4.10 - 5.70 M/uL    HGB 10.7 (L) 12.1 - 17.0 g/dL    HCT 33.7 (L) 36.6 - 50.3 %    MCV 96.3 80.0 - 99.0 FL    MCH 30.6 26.0 - 34.0 PG    MCHC 31.8 30.0 - 36.5 g/dL    RDW 13.8 11.5 - 14.5 %    PLATELET 824 046 - 500 K/uL    MPV 10.8 8.9 - 12.9 FL    NRBC 0.0 0  WBC    ABSOLUTE NRBC 0.00 0.00 - 0.01 K/uL    NEUTROPHILS 86 (H) 32 - 75 %    BAND NEUTROPHILS 8 (H) 0 - 6 %    LYMPHOCYTES 4 (L) 12 - 49 %    MONOCYTES 2 (L) 5 - 13 %    EOSINOPHILS 0 0 - 7 %    BASOPHILS 0 0 - 1 %    IMMATURE GRANULOCYTES 0 %    ABS. NEUTROPHILS 14.0 (H) 1.8 - 8.0 K/UL    ABS. LYMPHOCYTES 0.6 (L) 0.8 - 3.5 K/UL    ABS. MONOCYTES 0.3 0.0 - 1.0 K/UL    ABS. EOSINOPHILS 0.0 0.0 - 0.4 K/UL    ABS. BASOPHILS 0.0 0.0 - 0.1 K/UL    ABS. IMM. GRANS. 0.0 K/UL    DF MANUAL      RBC COMMENTS NORMOCYTIC, NORMOCHROMIC         CT Results  (Last 48 hours)               06/14/19 1416  CT HEAD WO CONT Final result    Impression:  IMPRESSION: Moderate white matter disease. No acute infarct. Narrative:  EXAM: CT HEAD WO CONT       INDICATION: Acute mental status change. Nausea, fatigue, confusion. COMPARISON: None. CONTRAST: None. TECHNIQUE: Unenhanced CT of the head was performed using 5 mm images. Brain and   bone windows were generated. CT dose reduction was achieved through use of a   standardized protocol tailored for this examination and automatic exposure   control for dose modulation. FINDINGS:   The ventricles and sulci are prominent but symmetric in size, shape and   configuration and midline. There is moderate periventricular white matter   hypodensity. There is no intracranial hemorrhage, extra-axial collection, mass,   mass effect or midline shift. The basilar cisterns are open. No acute infarct   is identified. The bone windows demonstrate no abnormalities. The visualized   portions of the paranasal sinuses and mastoid air cells are clear.           06/14/19 1416  CT SPINE LUMB WO CONT Final result    Impression:  IMPRESSION:        Incidental right mid ureteral stone. Multilevel degenerative disc disease. Mild central canal stenosis L3-4.     Significant osseous neural foraminal stenosis at L4-5 and L5-S1 as detailed. Narrative:  EXAM:  CT LUMBAR SPINE WITHOUT  CONTRAST       INDICATION: Ground-level fall, axial load. COMPARISON: None. CONTRAST:  None. TECHNIQUE: Multislice helical CT of the lumbar spine was performed without   intravenous contrast administration. Coronal and sagittal reconstructions were   generated. CT dose reduction was achieved through use of a standardized   protocol tailored for this examination and automatic exposure control for dose   modulation. FINDINGS:       The alignment is within normal limits. There is no fracture or subluxation. The   paravertebral soft tissues are within normal limits. A right ureteral stone is noted at the pelvic inlet. Lower thoracic spine: Disc desiccation at T11-12. Mild disc bulge at T12-L1. L1-L2: No significant spinal canal stenosis. Mild disc bulge resulting in mild   bilateral neural foraminal stenosis. (Series 4, image 33). L2-L3: Disc osteophyte complex. This in combination with ligamentum flavum   hypertrophy and facet arthropathy results in central canal diameter of 11 mm. There is mild bilateral neural foraminal stenosis (series 4, image 48). Sheryl Harrison L3-L4: There is a broad disc bulge with a superimposed central protrusion. Bilateral ligamentum flavum hypertrophy and facet arthropathy is present. Central canal measures 9 mm anterior-posterior. Mild bilateral neural foraminal   stenosis (series 4, image 60). Sheryl Harrison L4-L5: Broad disc bulge, extending into the left lateral recess. Bilateral   ligamentum flavum hypertrophy and facet arthropathy. Central canal measures 12   mm anterior to posterior. Moderate severe right and severe left neural foraminal   stenosis (series 4, image 73). Sheryl Cornelio L5-S1: Disc osteophyte complex with disc desiccation. Central canal is patent.    Moderate severe bilateral osseous neural foraminal stenosis (series 4, image   84). Sj Chan 06/14/19 1416  CT ABD PELV WO CONT Final result    Impression:  IMPRESSION:   Probable perforated appendicitis    Incidental nonobstructing right renal calculi. Right basilar subsegmental atelectasis   A right hydroureter secondary to a 5 mm calculus at the pelvic inlet    This result was verbally relayed by me to Flash Stoll RN at 1432 hours. 789       Narrative:  EXAM: CT ABD PELV WO CONT       INDICATION: Generalized abdominal pain, fatigue, nausea, vomiting, confusion       COMPARISON: None       CONTRAST:  None. TECHNIQUE:    Thin axial images were obtained through the abdomen and pelvis. Coronal and   sagittal reconstructions were generated. Oral contrast was not administered. CT   dose reduction was achieved through use of a standardized protocol tailored for   this examination and automatic exposure control for dose modulation. The absence of intravenous contrast material reduces the sensitivity for   evaluation of the solid parenchymal organs of the abdomen. FINDINGS:    LUNG BASES: Right basilar subsegmental atelectasis. INCIDENTALLY IMAGED HEART AND MEDIASTINUM: Calcified right hilar node. Coronary   artery calcification. LIVER: No mass or biliary dilatation. GALLBLADDER: Unremarkable. SPLEEN: No mass. PANCREAS: No mass or ductal dilatation. ADRENALS: Unremarkable. KIDNEYS/URETERS: No mass. 2 punctate nonobstructing calcifications in the right   kidney. , Right hydroureter secondary to 5 mm calculus at the pelvic inlet   (series 2, image 59). STOMACH: Unremarkable. SMALL BOWEL: No dilatation or wall thickening. COLON: No dilatation or wall thickening. APPENDIX: Appendix contains at least 2 calculi (image coronal 61). There is a   fluid collection just inferior to the appendix that measures 5 x 3 x 2 cm   (series 2, image 84 and coronal image 53). PERITONEUM: No ascites or pneumoperitoneum.    RETROPERITONEUM: No lymphadenopathy or aortic aneurysm. REPRODUCTIVE ORGANS: Small prostate with central calcifications. URINARY BLADDER: No mass or calculus. BONES: Degenerative disk disease. ADDITIONAL COMMENTS: Fat-containing left inguinal hernia. Assessment:     Hospital Problems  Date Reviewed: 7/5/2018          Codes Class Noted POA    Appendicitis with perforation ICD-10-CM: K35.32  ICD-9-CM: 540.0  6/14/2019 Unknown          right ureteral stone    Plan: We can place right ureteral stent at the time of appendectomy. Please call [facility] office [phone] when surgery is being scheduled to coordinate right ureteral stent placement during same anesthetic setting.          Signed By: Alexx Riley MD     Maria Luisa 15, 2019

## 2019-06-15 NOTE — PROGRESS NOTES
1930 Bedside and Verbal shift change report given to ABILIO Noel RN (oncoming nurse) by NICO Andrews RN (offgoing nurse). Report included the following information SBAR, Kardex, ED Summary, MAR, Accordion, Recent Results, Med Rec Status and Cardiac Rhythm Sinus Tachycardia. 2000 Patient drowsy, opens eyes to voice, follows some commands  Pupils equal round reactive 2's, pt not communicating well  Oriented to self at baseline per family and previous RN report. Pt mostly says no, confused  Moves x 4 spontaneously, not consistent with commands. Sinus tachycardia, low 100's    0000 Reassessment, no changes  0215 Pt states he needs to poop, no bowel movement on bedpan  Labs drawn  0521 Dr. Bill Felton paged for 6/14/19 Hgb 14.1 drop to 10.7 06/15/19 lab draws  Pt still low sinus tachycardia, SBP stable currently 110/72 (82)   No obvious bleeding, no shadowing, abdominal distention remains the same.

## 2019-06-15 NOTE — PROGRESS NOTES
Day #2 of Zosyn  Indication:  Intraabdominal Infection - Perforated appendicitis  Current regimen:  3.375 g Q 6H  Abx regimen: Zosyn  Recent Labs     06/15/19  1137 06/15/19  0423 19  1322   WBC 14.9* 14.5* 19.5*   CREA  --  1.58* 2.98*   BUN  --  35* 38*     Est CrCl: 55 ml/min; UO: -- ml/kg/hr  Temp (24hrs), Av.2 °F (37.3 °C), Min:98 °F (36.7 °C), Max:100.8 °F (38.2 °C)    Cultures:    BCx - NG - preliminary    Plan: Change to 3.375 g Q8H

## 2019-06-15 NOTE — PROGRESS NOTES
Pharmacist Note - Vancomycin Dosing    Consult provided for this 72 y.o. male for indication of intraabdominal infection (perforated appendix). Antibiotic regimen(s): Vancomycin + Zosyn    Recent Labs     06/15/19  1137 06/15/19  0423 19  1322   WBC 14.9* 14.5* 19.5*   CREA  --  1.58* 2.98*   BUN  --  35* 38*     Frequency of BMP: every day x 5 (already ordered)  Height: 180.3 cm  Weight: 112.7 kg  Est CrCl: 54.8 ml/min  Temp (24hrs), Av.8 °F (37.7 °C), Min:99.1 °F (37.3 °C), Max:100.8 °F (38.2 °C)    Cultures:  2019 Blood cx, NGTD, preliminary    Goal trough = 15 - 20 mcg/mL    Therapy will be initiated with a loading dose of 2000 mg IV x 1 to be followed by a maintenance dose of 1250 mg IV every 18 hours. Note that his renal function is improving. Pharmacy to follow patient daily and order levels / make dose adjustments as appropriate.

## 2019-06-15 NOTE — PROGRESS NOTES
0800: report received from leonides. 1100: pt taken to xray for abd xray. 1300: dr Gael Pedersen paged re: temps. Tylenol ordered. Also order to [facility]ult [facility] re: kidney stone. Dr Emmanuel Graff on call. 1320: dr Sabra Franco called back, given update and dr Sabra Franco will be in to see pt today. 1345: dr Gael Pedersen paged re: pt wheezing. Lasix, duo-nebs, and decreased rate with fluids ordered. Dr Gael Pedersen also wants a ng tube if pt vomits. 1800: dr Gael Pedersen paged and informed of continued temps, urology note, pt pulling at howard cath, and pt screaming out in pain. Order received for one time dilaudid 1 mg iv, and restraints. 1930: dr Gael Pedersen on floor to see pt. Dr Gael Pedersen to take pt to surgery tonight. Consent received from Bladen, daughter. 1945: dr Sabra Franco, urology paged.

## 2019-06-15 NOTE — PROGRESS NOTES
PCCM  AFVSS  On no pressors  No WOB    WBC coming down, CR coming down, no fevers  CXR low lung volumes    Plan:  Can go to [facility] on current abx  Needs repeat CXR ( current one poor quality)  Will see as needed.

## 2019-06-15 NOTE — PROGRESS NOTES
[id number] [facility] Surgery    HD #2    Subjective     NPO, still having some pain, no fevers, no BM    Objective     Patient Vitals for the past 24 hrs:   Temp Pulse Resp BP SpO2   06/15/19 0800 99.6 °F (37.6 °C) (!) 104 25 119/76 94 %   06/15/19 0700  (!) 106 18 117/67 94 %   06/15/19 0600  (!) 109 26 121/75 93 %   06/15/19 0500  (!) 104 20 110/72 93 %   06/15/19 0400 99.1 °F (37.3 °C) (!) 101 24 129/79 92 %   06/15/19 0300  (!) 101 22 112/71 92 %   06/15/19 0200  (!) 105 18 105/69 93 %   06/15/19 0100  (!) 106 22 112/68 91 %   06/15/19 0000 99.1 °F (37.3 °C)   123/71    06/14/19 2300  100 19 101/64 93 %   06/14/19 2200  96 19 103/65 91 %   06/14/19 2100  99 20 97/63 93 %   06/14/19 2000 99.4 °F (37.4 °C) (!) 105 25 107/66 93 %   06/14/19 1900  (!) 106 24 117/84 92 %   06/14/19 1800  99 24 100/67 94 %   06/14/19 1700  (!) 108 23 99/64 93 %   06/14/19 1654 99 °F (37.2 °C) (!) 107 21 109/72 95 %   06/14/19 1606 99.1 °F (37.3 °C) (!) 102 16 111/70 94 %   06/14/19 1546  (!) 102 19 112/67 97 %   06/14/19 1545 99.1 °F (37.3 °C) (!) 102 28 90/62 96 %   06/14/19 1530  99 23 100/64 95 %   06/14/19 1515  (!) 101 23 117/69    06/14/19 1459 98 °F (36.7 °C)       06/14/19 1445  95 20 113/67 97 %   06/14/19 1430  94  99/67 97 %   06/14/19 1400  97  95/63 98 %   06/14/19 1345  (!) 101 19 92/54 98 %   06/14/19 1320 98.5 °F (36.9 °C)       06/14/19 1313 98.3 °F (36.8 °C) (!) 109 20 102/69 98 %       Date 06/14/19 0700 - 06/15/19 0659 06/15/19 0700 - 06/16/19 0659   Shift 9947-8494 2854-1780 24 Hour Total 0098-6150 5568-4718 24 Hour Total   INTAKE   I.V.(mL/kg/hr) 0(0) 8264(3) 5275(1)        Volume (lactated Ringers infusion) 0 1895 1895        Volume (albumin human 5% (BUMINATE) solution 25 g)  500 500        Volume (levoFLOXacin (LEVAQUIN) 500 mg in D5W IVPB)  100 100        Volume (metroNIDAZOLE (FLAGYL) IVPB premix 500 mg)  200 200      Shift Total(mL/kg) 0(0) 9726(20.7) 4630(84.4)      OUTPUT   Urine(mL/kg/hr)  605(0.4) 605(0.2) 215  215     Urine Output (mL) (Urinary Catheter 06/14/19)  605 605 215  215   Shift Total(mL/kg)  605(5.4) 605(5.4) 215(1.9)  215(1.9)   NET 0 2090 2090 -215  -215   Weight (kg) 110.6 112.7 112.7 112.7 112.7 112.7       PE  Pulm - CTAB  CV - RRR  Abd - soft, a little more distended today, BS hypo, less TTP but still TTP periumbilical most, no guarding today    Labs  Recent Results (from the past 12 hour(s))   METABOLIC PANEL, COMPREHENSIVE    Collection Time: 06/15/19  4:23 AM   Result Value Ref Range    Sodium 146 (H) 136 - 145 mmol/L    Potassium 4.4 3.5 - 5.1 mmol/L    Chloride 114 (H) 97 - 108 mmol/L    CO2 26 21 - 32 mmol/L    Anion gap 6 5 - 15 mmol/L    Glucose 109 (H) 65 - 100 mg/dL    BUN 35 (H) 6 - 20 MG/DL    Creatinine 1.58 (H) 0.70 - 1.30 MG/DL    BUN/Creatinine ratio 22 (H) 12 - 20      GFR est AA 53 (L) >60 ml/min/1.73m2    GFR est non-AA 43 (L) >60 ml/min/1.73m2    Calcium 8.2 (L) 8.5 - 10.1 MG/DL    Bilirubin, total 0.5 0.2 - 1.0 MG/DL    ALT (SGPT) 7 (L) 12 - 78 U/L    AST (SGOT) 13 (L) 15 - 37 U/L    Alk. phosphatase 50 45 - 117 U/L    Protein, total 6.3 (L) 6.4 - 8.2 g/dL    Albumin 2.9 (L) 3.5 - 5.0 g/dL    Globulin 3.4 2.0 - 4.0 g/dL    A-G Ratio 0.9 (L) 1.1 - 2.2     CBC W/O DIFF    Collection Time: 06/15/19  4:23 AM   Result Value Ref Range    WBC 14.5 (H) 4.1 - 11.1 K/uL    RBC 3.52 (L) 4.10 - 5.70 M/uL    HGB 10.7 (L) 12.1 - 17.0 g/dL    HCT 34.1 (L) 36.6 - 50.3 %    MCV 96.9 80.0 - 99.0 FL    MCH 30.4 26.0 - 34.0 PG    MCHC 31.4 30.0 - 36.5 g/dL    RDW 13.7 11.5 - 14.5 %    PLATELET 444 078 - 261 K/uL    MPV 11.2 8.9 - 12.9 FL    NRBC 0.0 0  WBC    ABSOLUTE NRBC 0.00 0.00 - 0.01 K/uL   LACTIC ACID    Collection Time: 06/15/19  4:23 AM   Result Value Ref Range    Lactic acid 1.1 0.4 - 2.0 MMOL/L         Assessment     Francy Do is a 72 y. o.yr old male with perforated appendicitis and R nephrolithiasis    Plan     He does seem to be improving based on the labs and vitals this am, no more hypotension  Still has some tachycardia but the LA is normal now and WBC decreased to 14  Hgb lower at 10.7 but he got a lot of IVF yesterday and we do not have a baseline for him  I will repeat the Hgb today at 12pm, no signs of GI bleeding or retroperitoneal hematoma on the previous CT  Urology to see him today for R kidney stone  Sips of clears today  Continuing IV abx  Acute renal failure - Cr improving with IVF resuscitation UOP good, keeping the howard cathter unruly  Holding on the lovenox today since the drop in hgb but will start it soon  Staying in the ICU today    Sabine Cleary MD

## 2019-06-16 LAB
ANION GAP SERPL CALC-SCNC: 9 MMOL/L (ref 5–15)
BUN SERPL-MCNC: 28 MG/DL (ref 6–20)
BUN/CREAT SERPL: 21 (ref 12–20)
CALCIUM SERPL-MCNC: 7.9 MG/DL (ref 8.5–10.1)
CHLORIDE SERPL-SCNC: 110 MMOL/L (ref 97–108)
CO2 SERPL-SCNC: 24 MMOL/L (ref 21–32)
CREAT SERPL-MCNC: 1.33 MG/DL (ref 0.7–1.3)
ERYTHROCYTE [DISTWIDTH] IN BLOOD BY AUTOMATED COUNT: 13.7 % (ref 11.5–14.5)
GLUCOSE SERPL-MCNC: 165 MG/DL (ref 65–100)
HCT VFR BLD AUTO: 31.1 % (ref 36.6–50.3)
HGB BLD-MCNC: 9.7 G/DL (ref 12.1–17)
LACTATE SERPL-SCNC: 2.9 MMOL/L (ref 0.4–2)
MCH RBC QN AUTO: 30.1 PG (ref 26–34)
MCHC RBC AUTO-ENTMCNC: 31.2 G/DL (ref 30–36.5)
MCV RBC AUTO: 96.6 FL (ref 80–99)
NRBC # BLD: 0 K/UL (ref 0–0.01)
NRBC BLD-RTO: 0 PER 100 WBC
PLATELET # BLD AUTO: 185 K/UL (ref 150–400)
PMV BLD AUTO: 11.1 FL (ref 8.9–12.9)
POTASSIUM SERPL-SCNC: 3.8 MMOL/L (ref 3.5–5.1)
RBC # BLD AUTO: 3.22 M/UL (ref 4.1–5.7)
SODIUM SERPL-SCNC: 143 MMOL/L (ref 136–145)
WBC # BLD AUTO: 13.3 K/UL (ref 4.1–11.1)

## 2019-06-16 PROCEDURE — 94664 DEMO&/EVAL PT USE INHALER: CPT

## 2019-06-16 PROCEDURE — 36415 COLL VENOUS BLD VENIPUNCTURE: CPT

## 2019-06-16 PROCEDURE — 74011250636 HC RX REV CODE- 250/636: Performed by: SURGERY

## 2019-06-16 PROCEDURE — 94640 AIRWAY INHALATION TREATMENT: CPT

## 2019-06-16 PROCEDURE — 88304 TISSUE EXAM BY PATHOLOGIST: CPT

## 2019-06-16 PROCEDURE — 74011000258 HC RX REV CODE- 258: Performed by: SURGERY

## 2019-06-16 PROCEDURE — 80048 BASIC METABOLIC PNL TOTAL CA: CPT

## 2019-06-16 PROCEDURE — 74011636320 HC RX REV CODE- 636/320: Performed by: SURGERY

## 2019-06-16 PROCEDURE — 74011000250 HC RX REV CODE- 250

## 2019-06-16 PROCEDURE — 74011250637 HC RX REV CODE- 250/637: Performed by: SURGERY

## 2019-06-16 PROCEDURE — 83605 ASSAY OF LACTIC ACID: CPT

## 2019-06-16 PROCEDURE — P9045 ALBUMIN (HUMAN), 5%, 250 ML: HCPCS

## 2019-06-16 PROCEDURE — 74011250636 HC RX REV CODE- 250/636

## 2019-06-16 PROCEDURE — 74011250636 HC RX REV CODE- 250/636: Performed by: ANESTHESIOLOGY

## 2019-06-16 PROCEDURE — 74011000250 HC RX REV CODE- 250: Performed by: SURGERY

## 2019-06-16 PROCEDURE — 85027 COMPLETE CBC AUTOMATED: CPT

## 2019-06-16 PROCEDURE — 77030019563 HC DEV ATTCH FEED HOLL -A

## 2019-06-16 PROCEDURE — 65610000006 HC RM INTENSIVE CARE

## 2019-06-16 PROCEDURE — 74011000250 HC RX REV CODE- 250: Performed by: NURSE ANESTHETIST, CERTIFIED REGISTERED

## 2019-06-16 RX ORDER — FENTANYL CITRATE 50 UG/ML
INJECTION, SOLUTION INTRAMUSCULAR; INTRAVENOUS AS NEEDED
Status: DISCONTINUED | OUTPATIENT
Start: 2019-06-16 | End: 2019-06-16 | Stop reason: HOSPADM

## 2019-06-16 RX ORDER — ALBUMIN HUMAN 50 G/1000ML
SOLUTION INTRAVENOUS AS NEEDED
Status: DISCONTINUED | OUTPATIENT
Start: 2019-06-15 | End: 2019-06-16 | Stop reason: HOSPADM

## 2019-06-16 RX ORDER — SODIUM CHLORIDE 0.9 % (FLUSH) 0.9 %
5-40 SYRINGE (ML) INJECTION AS NEEDED
Status: DISCONTINUED | OUTPATIENT
Start: 2019-06-16 | End: 2019-06-16 | Stop reason: HOSPADM

## 2019-06-16 RX ORDER — ONDANSETRON 2 MG/ML
4 INJECTION INTRAMUSCULAR; INTRAVENOUS AS NEEDED
Status: DISCONTINUED | OUTPATIENT
Start: 2019-06-16 | End: 2019-06-16 | Stop reason: HOSPADM

## 2019-06-16 RX ORDER — DIPHENHYDRAMINE HYDROCHLORIDE 50 MG/ML
12.5 INJECTION, SOLUTION INTRAMUSCULAR; INTRAVENOUS AS NEEDED
Status: DISCONTINUED | OUTPATIENT
Start: 2019-06-16 | End: 2019-06-16 | Stop reason: HOSPADM

## 2019-06-16 RX ORDER — DEXMEDETOMIDINE HYDROCHLORIDE 4 UG/ML
INJECTION, SOLUTION INTRAVENOUS AS NEEDED
Status: DISCONTINUED | OUTPATIENT
Start: 2019-06-16 | End: 2019-06-16 | Stop reason: HOSPADM

## 2019-06-16 RX ORDER — HEPARIN SODIUM 5000 [USP'U]/ML
5000 INJECTION, SOLUTION INTRAVENOUS; SUBCUTANEOUS EVERY 8 HOURS
Status: DISCONTINUED | OUTPATIENT
Start: 2019-06-16 | End: 2019-06-22 | Stop reason: HOSPADM

## 2019-06-16 RX ORDER — ONDANSETRON 2 MG/ML
INJECTION INTRAMUSCULAR; INTRAVENOUS AS NEEDED
Status: DISCONTINUED | OUTPATIENT
Start: 2019-06-16 | End: 2019-06-16 | Stop reason: HOSPADM

## 2019-06-16 RX ORDER — LIDOCAINE HYDROCHLORIDE 20 MG/ML
INJECTION, SOLUTION EPIDURAL; INFILTRATION; INTRACAUDAL; PERINEURAL AS NEEDED
Status: DISCONTINUED | OUTPATIENT
Start: 2019-06-15 | End: 2019-06-16 | Stop reason: HOSPADM

## 2019-06-16 RX ORDER — SUCCINYLCHOLINE CHLORIDE 20 MG/ML
INJECTION INTRAMUSCULAR; INTRAVENOUS AS NEEDED
Status: DISCONTINUED | OUTPATIENT
Start: 2019-06-15 | End: 2019-06-16 | Stop reason: HOSPADM

## 2019-06-16 RX ORDER — SODIUM CHLORIDE 0.9 % (FLUSH) 0.9 %
5-40 SYRINGE (ML) INJECTION EVERY 8 HOURS
Status: DISCONTINUED | OUTPATIENT
Start: 2019-06-16 | End: 2019-06-16 | Stop reason: HOSPADM

## 2019-06-16 RX ORDER — SODIUM CHLORIDE, SODIUM LACTATE, POTASSIUM CHLORIDE, CALCIUM CHLORIDE 600; 310; 30; 20 MG/100ML; MG/100ML; MG/100ML; MG/100ML
100 INJECTION, SOLUTION INTRAVENOUS CONTINUOUS
Status: DISCONTINUED | OUTPATIENT
Start: 2019-06-16 | End: 2019-06-16

## 2019-06-16 RX ORDER — HYDROMORPHONE HYDROCHLORIDE 1 MG/ML
0.5 INJECTION, SOLUTION INTRAMUSCULAR; INTRAVENOUS; SUBCUTANEOUS
Status: DISCONTINUED | OUTPATIENT
Start: 2019-06-16 | End: 2019-06-16 | Stop reason: HOSPADM

## 2019-06-16 RX ORDER — PHENYLEPHRINE HCL IN 0.9% NACL 0.4MG/10ML
SYRINGE (ML) INTRAVENOUS AS NEEDED
Status: DISCONTINUED | OUTPATIENT
Start: 2019-06-16 | End: 2019-06-16 | Stop reason: HOSPADM

## 2019-06-16 RX ORDER — PROPOFOL 10 MG/ML
INJECTION, EMULSION INTRAVENOUS AS NEEDED
Status: DISCONTINUED | OUTPATIENT
Start: 2019-06-15 | End: 2019-06-16 | Stop reason: HOSPADM

## 2019-06-16 RX ORDER — MORPHINE SULFATE 10 MG/ML
2 INJECTION, SOLUTION INTRAMUSCULAR; INTRAVENOUS
Status: DISCONTINUED | OUTPATIENT
Start: 2019-06-16 | End: 2019-06-16 | Stop reason: HOSPADM

## 2019-06-16 RX ORDER — FENTANYL CITRATE 50 UG/ML
25 INJECTION, SOLUTION INTRAMUSCULAR; INTRAVENOUS
Status: DISCONTINUED | OUTPATIENT
Start: 2019-06-16 | End: 2019-06-16 | Stop reason: HOSPADM

## 2019-06-16 RX ORDER — LORAZEPAM 2 MG/ML
2 INJECTION INTRAMUSCULAR
Status: DISCONTINUED | OUTPATIENT
Start: 2019-06-16 | End: 2019-06-20

## 2019-06-16 RX ORDER — MIDAZOLAM HYDROCHLORIDE 1 MG/ML
0.5 INJECTION, SOLUTION INTRAMUSCULAR; INTRAVENOUS
Status: DISCONTINUED | OUTPATIENT
Start: 2019-06-16 | End: 2019-06-16 | Stop reason: HOSPADM

## 2019-06-16 RX ORDER — DEXAMETHASONE SODIUM PHOSPHATE 4 MG/ML
INJECTION, SOLUTION INTRA-ARTICULAR; INTRALESIONAL; INTRAMUSCULAR; INTRAVENOUS; SOFT TISSUE AS NEEDED
Status: DISCONTINUED | OUTPATIENT
Start: 2019-06-16 | End: 2019-06-16 | Stop reason: HOSPADM

## 2019-06-16 RX ORDER — BUPIVACAINE HYDROCHLORIDE AND EPINEPHRINE 5; 5 MG/ML; UG/ML
INJECTION, SOLUTION EPIDURAL; INTRACAUDAL; PERINEURAL AS NEEDED
Status: DISCONTINUED | OUTPATIENT
Start: 2019-06-16 | End: 2019-06-16 | Stop reason: HOSPADM

## 2019-06-16 RX ADMIN — IPRATROPIUM BROMIDE AND ALBUTEROL SULFATE 3 ML: .5; 3 SOLUTION RESPIRATORY (INHALATION) at 04:29

## 2019-06-16 RX ADMIN — Medication 10 ML: at 21:04

## 2019-06-16 RX ADMIN — PROPOFOL 25 MG: 10 INJECTION, EMULSION INTRAVENOUS at 01:42

## 2019-06-16 RX ADMIN — HEPARIN SODIUM 5000 UNITS: 5000 INJECTION INTRAVENOUS; SUBCUTANEOUS at 09:06

## 2019-06-16 RX ADMIN — ZONISAMIDE 100 MG: 100 CAPSULE ORAL at 08:22

## 2019-06-16 RX ADMIN — DEXMEDETOMIDINE HYDROCHLORIDE 8 MCG: 4 INJECTION, SOLUTION INTRAVENOUS at 02:11

## 2019-06-16 RX ADMIN — SODIUM CHLORIDE, SODIUM LACTATE, POTASSIUM CHLORIDE, AND CALCIUM CHLORIDE: 600; 310; 30; 20 INJECTION, SOLUTION INTRAVENOUS at 00:15

## 2019-06-16 RX ADMIN — PIPERACILLIN AND TAZOBACTAM 3.38 G: 3; .375 INJECTION, POWDER, FOR SOLUTION INTRAVENOUS at 21:36

## 2019-06-16 RX ADMIN — Medication 40 ML: at 13:07

## 2019-06-16 RX ADMIN — HYDROMORPHONE HYDROCHLORIDE 1 MG: 1 INJECTION, SOLUTION INTRAMUSCULAR; INTRAVENOUS; SUBCUTANEOUS at 05:56

## 2019-06-16 RX ADMIN — PROPOFOL 50 MG: 10 INJECTION, EMULSION INTRAVENOUS at 01:25

## 2019-06-16 RX ADMIN — DEXMEDETOMIDINE HYDROCHLORIDE 8 MCG: 4 INJECTION, SOLUTION INTRAVENOUS at 02:13

## 2019-06-16 RX ADMIN — MEMANTINE HYDROCHLORIDE 10 MG: 10 TABLET ORAL at 17:16

## 2019-06-16 RX ADMIN — FENTANYL CITRATE 25 MCG: 50 INJECTION INTRAMUSCULAR; INTRAVENOUS at 03:04

## 2019-06-16 RX ADMIN — DIVALPROEX SODIUM 500 MG: 500 TABLET, EXTENDED RELEASE ORAL at 08:22

## 2019-06-16 RX ADMIN — VANCOMYCIN HYDROCHLORIDE 1250 MG: 10 INJECTION, POWDER, LYOPHILIZED, FOR SOLUTION INTRAVENOUS at 06:07

## 2019-06-16 RX ADMIN — ROCURONIUM BROMIDE 10 MG: 10 INJECTION, SOLUTION INTRAVENOUS at 00:29

## 2019-06-16 RX ADMIN — FENTANYL CITRATE 50 MCG: 50 INJECTION, SOLUTION INTRAMUSCULAR; INTRAVENOUS at 00:35

## 2019-06-16 RX ADMIN — Medication 160 MCG: at 00:07

## 2019-06-16 RX ADMIN — POTASSIUM CHLORIDE AND SODIUM CHLORIDE: 450; 150 INJECTION, SOLUTION INTRAVENOUS at 02:30

## 2019-06-16 RX ADMIN — ACETAMINOPHEN 1000 MG: 650 SOLUTION ORAL at 16:21

## 2019-06-16 RX ADMIN — PROPOFOL 25 MG: 10 INJECTION, EMULSION INTRAVENOUS at 01:44

## 2019-06-16 RX ADMIN — DEXAMETHASONE SODIUM PHOSPHATE 4 MG: 4 INJECTION, SOLUTION INTRA-ARTICULAR; INTRALESIONAL; INTRAMUSCULAR; INTRAVENOUS; SOFT TISSUE at 00:20

## 2019-06-16 RX ADMIN — FENTANYL CITRATE 50 MCG: 50 INJECTION, SOLUTION INTRAMUSCULAR; INTRAVENOUS at 00:32

## 2019-06-16 RX ADMIN — DEXMEDETOMIDINE HYDROCHLORIDE 8 MCG: 4 INJECTION, SOLUTION INTRAVENOUS at 02:15

## 2019-06-16 RX ADMIN — Medication 5 ML: at 02:00

## 2019-06-16 RX ADMIN — HYDROMORPHONE HYDROCHLORIDE 1 MG: 1 INJECTION, SOLUTION INTRAMUSCULAR; INTRAVENOUS; SUBCUTANEOUS at 09:06

## 2019-06-16 RX ADMIN — TAMSULOSIN HYDROCHLORIDE 0.4 MG: 0.4 CAPSULE ORAL at 08:22

## 2019-06-16 RX ADMIN — CARBIDOPA AND LEVODOPA 0.5 TABLET: 25; 100 TABLET ORAL at 21:04

## 2019-06-16 RX ADMIN — ONDANSETRON 4 MG: 2 INJECTION INTRAMUSCULAR; INTRAVENOUS at 00:20

## 2019-06-16 RX ADMIN — IPRATROPIUM BROMIDE AND ALBUTEROL SULFATE 3 ML: .5; 3 SOLUTION RESPIRATORY (INHALATION) at 19:30

## 2019-06-16 RX ADMIN — DEXMEDETOMIDINE HYDROCHLORIDE 8 MCG: 4 INJECTION, SOLUTION INTRAVENOUS at 02:09

## 2019-06-16 RX ADMIN — ROCURONIUM BROMIDE 20 MG: 10 INJECTION, SOLUTION INTRAVENOUS at 00:03

## 2019-06-16 RX ADMIN — CARBIDOPA AND LEVODOPA 0.5 TABLET: 25; 100 TABLET ORAL at 16:21

## 2019-06-16 RX ADMIN — IPRATROPIUM BROMIDE AND ALBUTEROL SULFATE 3 ML: .5; 3 SOLUTION RESPIRATORY (INHALATION) at 02:30

## 2019-06-16 RX ADMIN — LORAZEPAM 2 MG: 2 INJECTION INTRAMUSCULAR; INTRAVENOUS at 20:55

## 2019-06-16 RX ADMIN — HYDROMORPHONE HYDROCHLORIDE 1 MG: 1 INJECTION, SOLUTION INTRAMUSCULAR; INTRAVENOUS; SUBCUTANEOUS at 16:19

## 2019-06-16 RX ADMIN — POTASSIUM CHLORIDE AND SODIUM CHLORIDE: 450; 150 INJECTION, SOLUTION INTRAVENOUS at 12:28

## 2019-06-16 RX ADMIN — MEMANTINE HYDROCHLORIDE 10 MG: 10 TABLET ORAL at 08:30

## 2019-06-16 RX ADMIN — DONEPEZIL HYDROCHLORIDE 10 MG: 10 TABLET, FILM COATED ORAL at 08:22

## 2019-06-16 RX ADMIN — FENTANYL CITRATE 25 MCG: 50 INJECTION INTRAMUSCULAR; INTRAVENOUS at 03:09

## 2019-06-16 RX ADMIN — SUGAMMADEX 200 MG: 100 INJECTION, SOLUTION INTRAVENOUS at 01:40

## 2019-06-16 RX ADMIN — SERTRALINE HYDROCHLORIDE 150 MG: 50 TABLET ORAL at 08:22

## 2019-06-16 RX ADMIN — Medication 10 ML: at 05:53

## 2019-06-16 RX ADMIN — POTASSIUM CHLORIDE AND SODIUM CHLORIDE: 450; 150 INJECTION, SOLUTION INTRAVENOUS at 22:54

## 2019-06-16 RX ADMIN — DEXMEDETOMIDINE HYDROCHLORIDE 8 MCG: 4 INJECTION, SOLUTION INTRAVENOUS at 02:07

## 2019-06-16 RX ADMIN — HYDROMORPHONE HYDROCHLORIDE 1 MG: 1 INJECTION, SOLUTION INTRAMUSCULAR; INTRAVENOUS; SUBCUTANEOUS at 20:44

## 2019-06-16 RX ADMIN — PIPERACILLIN AND TAZOBACTAM 3.38 G: 3; .375 INJECTION, POWDER, FOR SOLUTION INTRAVENOUS at 13:07

## 2019-06-16 RX ADMIN — PROPOFOL 25 MG: 10 INJECTION, EMULSION INTRAVENOUS at 01:28

## 2019-06-16 RX ADMIN — IPRATROPIUM BROMIDE AND ALBUTEROL SULFATE 3 ML: .5; 3 SOLUTION RESPIRATORY (INHALATION) at 15:37

## 2019-06-16 RX ADMIN — HYDROMORPHONE HYDROCHLORIDE 1 MG: 1 INJECTION, SOLUTION INTRAMUSCULAR; INTRAVENOUS; SUBCUTANEOUS at 12:49

## 2019-06-16 RX ADMIN — HEPARIN SODIUM 5000 UNITS: 5000 INJECTION INTRAVENOUS; SUBCUTANEOUS at 16:21

## 2019-06-16 RX ADMIN — IPRATROPIUM BROMIDE AND ALBUTEROL SULFATE 3 ML: .5; 3 SOLUTION RESPIRATORY (INHALATION) at 11:46

## 2019-06-16 RX ADMIN — CARBIDOPA AND LEVODOPA 0.5 TABLET: 25; 100 TABLET ORAL at 08:22

## 2019-06-16 RX ADMIN — PIPERACILLIN AND TAZOBACTAM 3.38 G: 3; .375 INJECTION, POWDER, FOR SOLUTION INTRAVENOUS at 05:56

## 2019-06-16 RX ADMIN — IPRATROPIUM BROMIDE AND ALBUTEROL SULFATE 3 ML: .5; 3 SOLUTION RESPIRATORY (INHALATION) at 23:51

## 2019-06-16 RX ADMIN — IPRATROPIUM BROMIDE AND ALBUTEROL SULFATE 3 ML: .5; 3 SOLUTION RESPIRATORY (INHALATION) at 07:59

## 2019-06-16 NOTE — OP NOTES
[facility]  OPERATIVE REPORT    Name:  Tracy Vega  MR#:  [id number]  :  1945  ACCOUNT #:  [de-identified]  DATE OF SERVICE:  2019    PREOPERATIVE DIAGNOSIS:  Perforated appendicitis and right ureteral stone. POSTOPERATIVE DIAGNOSIS:  Perforated appendicitis and right ureteral stone. PROCEDURE PERFORMED:  Laparoscopic appendectomy. SURGEON:  Chi Collins MD    ASSISTANT:  Janeen Vargas SA    ANESTHESIA:  General.    COMPLICATIONS:  None. SPECIMENS REMOVED:  Appendix. IMPLANTS:  None. ESTIMATED BLOOD LOSS:  20 mL. COUNTS:  The counts were correct. FINDINGS:  Perforated appendix. Appendix was fully resected. Abscess in the right lower quadrant of the perforation. Small and large bowel distention from ileus. A 19-Kiswahili round Lowell drain placed in the right lower quadrant and right ureteral stent was placed by Urology. INDICATIONS FOR OPERATION:  The patient is a 79-year-old male who has been admitted with perforated appendicitis. He has failed nonoperative treatment with antibiotics with persistent fevers, tachycardia, and peritonitis and is needing laparoscopic appendectomy with possible ureteral stent placement for the right kidney stone. DESCRIPTION OF OPERATION:  The patient was met in the preop holding area. The H and P was updated. Consent was signed. All risks and benefits were explained to the patient prior to start of the operation. Urology started off first placing the right ureteral stent. Once that stent was placed and they had completed their portion of the operation, we then started the abdominal portion of the operation. The abdomen was prepped and draped in standard sterile fashion. Time-out was called. Antibiotics were given. SCDs were on lower extremities. I started the operation by making a 5-mm incision to the right upper quadrant inserting a VisiPort trocar into the intra-abdominal cavity insufflating to 15 mmHg. We were able to get a small window into the upper abdomen. We tilted the patient to the right side in a little bit of Trendelenburg. We placed a 5-mm periumbilical trocar site, a 5-mm suprapubic trocar, and a 5-mm left lower abdomen trocar. We then looked down into the right lower quadrant and we could see the area near the appendix. We noted that the small bowel was extremely distended as well as the right colon and sigmoid colon due to the ileus. We were able to see in the right lower quadrant. There was lot of inflammation present down in that area. We dissected with a suction dissector and found an abscess cavity and drained that abscess cavity with suction . We could see a portion of the appendix and there appeared to be a perforation with rupture and necrosis of the mid body of the appendix. We were able to dissect the tip of the appendix up from out of the pelvis and then once we had identified our anatomy, we dissected the appendix free from the mesentery with the Sonicision device down to the base of the appendix. Once the base of the appendix was exposed, we then used a 30-mm tan load Endo-MELVIN stapler dividing the appendix with the stapler and passing the appendix specimen off the field and removing that from the patient with an Endo Catch bag. Specimen was passed off as perforated appendix for pathology. We then irrigated out the right lower quadrant with 1000 mL of saline irrigation. The area was then cleaned. The operative site appeared to be hemostatic and there was no sign of any other purulence. We looked throughout the abdominal cavity, at least the parts we could see pretty well, and there was definitely no sign of any bowel ischemia, just distended bowels, but no sign of distant purulent peritonitis throughout the abdominal cavity, most localized down into the right lower quadrant. Our operation was complete.   We did place a 19-English round Lowell drain into the right lower quadrant in the appendectomy bed and brought that out of the suprapubic trocar site and then closed our 12-mm left lower quadrant trocar site with an Endo Close suture passing device in interrupted figure-of-eight fashion. We then desufflated the abdominal cavity, removed the trocars, and closed the skin with 4-0 Monocryl and Dermabond to complete the operation. Dr. Javy Manjarrez was present and scrubbed during the entire operation.       Tevin Scott MD      [id number]  D:  06/16/2019 1:53  T:  06/16/2019 1:56  JOB #:  [id number]

## 2019-06-16 NOTE — PROGRESS NOTES
Came to see the patient this pm and he has been having increasing confusion, still tachycardic now with persistent fevers. He is having more abdominal pain today and now appears to have peritonitis. He will exploration in the OR tonight. I have discussed this with his daughters and I have consented them for diagnostic laparoscopy, possible open laparotomy and appendectomy. [facility] I have discussed the above procedure with the patient in detail. We reviewed the benefits and possible complications of the surgery which include bleeding, infection, damage to adjacent organs, venous thromboembolism, need for repeat surgery, death and other unforseen complications. The patient agreed to proceed with the surgery.         Sheyla Early

## 2019-06-16 NOTE — BRIEF OP NOTE
BRIEF OPERATIVE NOTE    Date of Procedure: 6/15/2019   Preoperative Diagnosis: POSSIBLE ILEUS  Postoperative Diagnosis: * No post-op diagnosis entered *    Procedure(s):    Cystoscopy Ureteral Stent Insertion Or Removal       * Linette Still MD - Co-Surgeon         Surgical Staff:  Circ-1: Travis Reese RN  Radiology Technician: Josh Cage  Scrub Tech-1: Brian, [address] [facility] Asst-1: Marci FELIPE  No case tracking events are documented in the log.   Anesthesia: General   Estimated Blood Loss: 0  Specimens: * No specimens in log *   Findings: stone   Complications: none  Implants: * No implants in log *    Dict#:[id number]    Dc howard per primary team.

## 2019-06-16 NOTE — OP NOTES
[address] [place]   OPERATIVE REPORT    Name:  Shala Schulz  MR#:  [id number]  :  1945  ACCOUNT #:  [de-identified]  DATE OF SERVICE:  06/15/2019    PREOPERATIVE DIAGNOSIS:  Right ureteral stone. POSTOPERATIVE DIAGNOSIS:  Right ureteral stone. PROCEDURES PERFORMED:  1. Cystoscopy with retrograde pyelogram with interpretation. 2.  Ureteral stent insertion on the right. 3.  Simple You catheter change. SURGEON:  Marc Clements MD    ASSISTANT:  None. ANESTHESIA:  General.    COMPLICATIONS:  None. SPECIMENS REMOVED:  None. IMPLANTS:  None. ESTIMATED BLOOD LOSS:  None. FINDINGS:  Right ureteral stone, 6 x 28 ureteral stent. INDICATIONS FOR PROCEDURE:  The patient is a 31-year-old male with appendicitis. He was incidentally found to have a right ureteral stone. Because he has been brought to the operating room today for appendectomy and exploration, decision was made to place a right ureteral stent for his stone. PROCEDURE IN DETAIL:  After obtaining informed consent, the patient was brought back to the operating room and placed on the operating table in supine position. Anesthesia was induced. Airway was established without complication. The patient was prepped and draped in usual aseptic fashion. After final time-out, rigid cystoscope was inserted into the bladder. Urethra was normal, prostate was normal, bladder was normal, trigone and ureteral orifices were normal.  Right ureteral orifice was identified and cannulated with the TigerTail and contrast was injected. RADIOGRAPHIC INTERPRETATION AND RETROGRADE PYELOGRAM:  Fluoroscopic imaging revealed normal caliber ureter up to the distal third where a filling defect was seen with very mild proximal hydroureteronephrosis. This was consistent with known ureteral stone. Next, a 0.035 Sensor wire was advanced up to the kidney under fluoroscopy.   The TigerTail was exchanged for a 6 x 28 ureteral stent without a string, advanced over the wire, and the wire was removed leaving a coil in the kidney and bladder demonstrating appropriate positioning. The cystoscope was removed and a 16-Romansh You catheter was inserted and 10 mL in the balloon to gravity drainage. At that time, the patient was left on the table for Dr. Horace Wheatley to do his part of the surgery.       Clifton Horan MD      [id number]  D:  06/16/2019 0:16  T:  06/16/2019 1:31  JOB #:  [id number]

## 2019-06-16 NOTE — PROGRESS NOTES
Progress Note    Patient: Rosalinda Matta MRN: [id number]  SSN: [id number]    YOB: 1945  Age: 72 y.o. Sex: male      Admit Date: 6/14/2019    LOS: 2 days     Subjective:   POD 1 right ureteral stent; lap appy    Urine draining. Objective:     Vitals:    06/16/19 0447 06/16/19 0500 06/16/19 0600 06/16/19 0700   BP:  98/73 101/58 107/59   Pulse:  (!) 102 (!) 101 (!) 111   Resp:  22 23 23   Temp:       SpO2:  95% 93% 93%   Weight: 116.8 kg (257 lb 8 oz)      Height:            Intake and Output:  Current Shift: No intake/output data recorded. Last three shifts: 06/14 1901 - 06/16 0700  In: 8542.5 [I.V.:8542.5]  Out: 5221 [Urine:2575; Drains:40]    Physical Exam:   GENERAL: sleeping comfortably  UROLOGIC: clear yellow urine in howard bag    Lab/Data Review:  BMP:   Lab Results   Component Value Date/Time     06/16/2019 04:17 AM    K 3.8 06/16/2019 04:17 AM     (H) 06/16/2019 04:17 AM    CO2 24 06/16/2019 04:17 AM    AGAP 9 06/16/2019 04:17 AM     (H) 06/16/2019 04:17 AM    BUN 28 (H) 06/16/2019 04:17 AM    CREA 1.33 (H) 06/16/2019 04:17 AM    GFRAA >60 06/16/2019 04:17 AM    GFRNA 53 (L) 06/16/2019 04:17 AM     CBC:   Lab Results   Component Value Date/Time    WBC 13.3 (H) 06/16/2019 04:17 AM    HGB 9.7 (L) 06/16/2019 04:17 AM    HCT 31.1 (L) 06/16/2019 04:17 AM     06/16/2019 04:17 AM            Assessment:     Active Problems:    Appendicitis with perforation (6/14/2019)    right ureteral stone    Plan:     Please have patient follow up with [facility] for outpatient treatment of ureteral stone. [phone]  Dc howard per primary team.    Thank you for allowing us to take part in patient's care. Call with questions.      Signed By: Christopher Green MD     June 16, 2019

## 2019-06-16 NOTE — ANESTHESIA PREPROCEDURE EVALUATION
Relevant Problems   No relevant active problems       Anesthetic History   No history of anesthetic complications            Review of Systems / Medical History  Patient summary reviewed, nursing notes reviewed and pertinent labs reviewed    Pulmonary  Within defined limits                 Neuro/Psych   Within defined limits      Neuromuscular disease, psychiatric history and dementia     Cardiovascular  Within defined limits  Hypertension              Exercise tolerance: <4 METS     GI/Hepatic/Renal  Within defined limits              Endo/Other  Within defined limits           Other Findings   Comments: peritonitis           Physical Exam    Airway  Mallampati: II  TM Distance: > 6 cm  Neck ROM: normal range of motion   Mouth opening: Normal     Cardiovascular  Regular rate and rhythm,  S1 and S2 normal,  no murmur, click, rub, or gallop             Dental  No notable dental hx       Pulmonary  Breath sounds clear to auscultation               Abdominal  GI exam deferred       Other Findings            Anesthetic Plan    ASA: 3, emergent  Anesthesia type: general          Induction: Intravenous  Anesthetic plan and risks discussed with: Patient

## 2019-06-16 NOTE — PROGRESS NOTES
Mr. Nery Celaya is confused this AM.  Tm 100.8 Tc 98.2 HR: 111 BP: 107/59 Resp Rate: 23 92% sat on 2 liters/minute. Intake/Output Summary (Last 24 hours) at 6/16/2019 0803  Last data filed at 6/16/2019 0700  Gross per 24 hour   Intake 5547.5 ml   Output 2105 ml   Net 3442.5 ml   Exam: Cor: RRR. Lungs: Bilateral breath sounds. Clear to auscultation. Abd: Soft. Distended. Tender. Dressings dry. Drain output is serosanguinous. Labs:   Recent Results (from the past 12 hour(s))   METABOLIC PANEL, BASIC    Collection Time: 06/16/19  4:17 AM   Result Value Ref Range    Sodium 143 136 - 145 mmol/L    Potassium 3.8 3.5 - 5.1 mmol/L    Chloride 110 (H) 97 - 108 mmol/L    CO2 24 21 - 32 mmol/L    Anion gap 9 5 - 15 mmol/L    Glucose 165 (H) 65 - 100 mg/dL    BUN 28 (H) 6 - 20 MG/DL    Creatinine 1.33 (H) 0.70 - 1.30 MG/DL    BUN/Creatinine ratio 21 (H) 12 - 20      GFR est AA >60 >60 ml/min/1.73m2    GFR est non-AA 53 (L) >60 ml/min/1.73m2    Calcium 7.9 (L) 8.5 - 10.1 MG/DL   CBC W/O DIFF    Collection Time: 06/16/19  4:17 AM   Result Value Ref Range    WBC 13.3 (H) 4.1 - 11.1 K/uL    RBC 3.22 (L) 4.10 - 5.70 M/uL    HGB 9.7 (L) 12.1 - 17.0 g/dL    HCT 31.1 (L) 36.6 - 50.3 %    MCV 96.6 80.0 - 99.0 FL    MCH 30.1 26.0 - 34.0 PG    MCHC 31.2 30.0 - 36.5 g/dL    RDW 13.7 11.5 - 14.5 %    PLATELET 715 976 - 260 K/uL    MPV 11.1 8.9 - 12.9 FL    NRBC 0.0 0  WBC    ABSOLUTE NRBC 0.00 0.00 - 0.01 K/uL   LACTIC ACID    Collection Time: 06/16/19  4:17 AM   Result Value Ref Range    Lactic acid 2.9 (HH) 0.4 - 2.0 MMOL/L   NPO for now. Continue NG decompression. IVF at 125 ml/hour. IV abx - continue Zosyn. Will d/c Vancomycin. Leave howard to accurately measure urine output. Urology following. DVT prophylaxis - SC Heparin. Pain medication and anti-emetics as needed.    Labs in AM.

## 2019-06-16 NOTE — BRIEF OP NOTE
BRIEF OPERATIVE NOTE    Date of Procedure: 6/16/2019   Preoperative Diagnosis: perforated appendicitis, RIGHT URETERAL STONE  Postoperative Diagnosis: perforated appendicitis, RIGHT URETERAL STONE  Procedure(s):  Laparoscopic appendectomy  Surgeon(s) and Role:     Kurt Eid MD - Primary         Surgical Staff:  Circ-1: Angela Fall RN  Radiology Technician: Nicolasa Brunner  Scrub Tech-1: Elenita HENRY  Surg Asst-1: Sheryl FELIPE  Event Time In Time Out   Incision Start 06/16/2019 0030    Incision Close       Anesthesia: General   Estimated Blood Loss: 20cc  Specimens:   ID Type Source Tests Collected by Time Destination   1 : appendix Fresh Appendix  Ras Singletary MD 6/16/2019 0110 Pathology      Findings: perforated appendix, appendix fully resected, abscess in RLQ at perforation, small and large bowel distention from ileus, 19Fr Drain in RLQ, R ureteral stent placed by urology  Complications: none  Implants: * No implants in log *

## 2019-06-16 NOTE — PROGRESS NOTES
0330 TRANSFER - IN REPORT:    Verbal report received from [facility]) on Francy Socks  being received from [facility] [employer]) for routine post - op      Report consisted of patients Situation, Background, Assessment and   Recommendations(SBAR). Information from the following report(s) SBAR and Cardiac Rhythm NSR/sinus tachycardia was reviewed with the receiving nurse. Opportunity for questions and clarification was provided. Assessment completed upon patients arrival to unit and care assumed.          Patient assessment complete, patient agitated/combative

## 2019-06-16 NOTE — PERIOP NOTES
TRANSFER - OUT REPORT:    Verbal report given to Lady Mccann RN(name) on Oneal Tolentino  being transferred to ICU 7(unit) for routine post - op       Report consisted of patients Situation, Background, Assessment and   Recommendations(SBAR). Time Pre op antibiotic given:see mar  Anesthesia Stop time: 0200  You Present on Transfer to floor:yes  Order for You on Chart:yes  Discharge Prescriptions with Chart:no    Information from the following report(s) SBAR, OR Summary, Intake/Output and MAR was reviewed with the receiving nurse. Opportunity for questions and clarification was provided. Is the patient on 02? YES       L/Min 3       Other n/a    Is the patient on a monitor? YES    Is the nurse transporting with the patient? YES    Surgical Waiting Area notified of patient's transfer from PACU?  YES      The following personal items collected during your admission accompanied patient upon transfer:   Dental Appliance:    Vision: Visual Aid: None  Hearing Aid:    Jewelry:    Clothing:    Other Valuables:    Valuables sent to safe:

## 2019-06-16 NOTE — PROGRESS NOTES
1930 Bedside and Verbal shift change report given to ABILIO Mason RN (oncoming nurse) by SOTERO Ramirez RN (offgoing nurse). Report included the following information SBAR, Kardex, ED Summary, MAR, Accordion, Recent Results, Med Rec Status and Cardiac Rhythm Sinus Tachycardia. 1945 Dr. Edwar Franks at bedside, SOTERO Ramirez RN putting in NGT to intermittent suction. Patient agitated, yelling. Received telephone consent for surgical procedure with Dr. Edwar Franks and Anyi Lorenzo RN.

## 2019-06-17 LAB
ANION GAP SERPL CALC-SCNC: 6 MMOL/L (ref 5–15)
BUN SERPL-MCNC: 24 MG/DL (ref 6–20)
BUN/CREAT SERPL: 19 (ref 12–20)
CALCIUM SERPL-MCNC: 8 MG/DL (ref 8.5–10.1)
CHLORIDE SERPL-SCNC: 113 MMOL/L (ref 97–108)
CO2 SERPL-SCNC: 25 MMOL/L (ref 21–32)
CREAT SERPL-MCNC: 1.27 MG/DL (ref 0.7–1.3)
ERYTHROCYTE [DISTWIDTH] IN BLOOD BY AUTOMATED COUNT: 14.1 % (ref 11.5–14.5)
GLUCOSE SERPL-MCNC: 84 MG/DL (ref 65–100)
HCT VFR BLD AUTO: 29.1 % (ref 36.6–50.3)
HGB BLD-MCNC: 9.1 G/DL (ref 12.1–17)
MCH RBC QN AUTO: 30.5 PG (ref 26–34)
MCHC RBC AUTO-ENTMCNC: 31.3 G/DL (ref 30–36.5)
MCV RBC AUTO: 97.7 FL (ref 80–99)
NRBC # BLD: 0 K/UL (ref 0–0.01)
NRBC BLD-RTO: 0 PER 100 WBC
PLATELET # BLD AUTO: 189 K/UL (ref 150–400)
PMV BLD AUTO: 10.8 FL (ref 8.9–12.9)
POTASSIUM SERPL-SCNC: 3.8 MMOL/L (ref 3.5–5.1)
RBC # BLD AUTO: 2.98 M/UL (ref 4.1–5.7)
SODIUM SERPL-SCNC: 144 MMOL/L (ref 136–145)
WBC # BLD AUTO: 10.4 K/UL (ref 4.1–11.1)

## 2019-06-17 PROCEDURE — 74011000250 HC RX REV CODE- 250: Performed by: SURGERY

## 2019-06-17 PROCEDURE — 74011250636 HC RX REV CODE- 250/636: Performed by: SURGERY

## 2019-06-17 PROCEDURE — 94640 AIRWAY INHALATION TREATMENT: CPT

## 2019-06-17 PROCEDURE — 77030019563 HC DEV ATTCH FEED HOLL -A

## 2019-06-17 PROCEDURE — 74011000258 HC RX REV CODE- 258: Performed by: SURGERY

## 2019-06-17 PROCEDURE — 65660000000 HC RM CCU STEPDOWN

## 2019-06-17 PROCEDURE — 85027 COMPLETE CBC AUTOMATED: CPT

## 2019-06-17 PROCEDURE — 36415 COLL VENOUS BLD VENIPUNCTURE: CPT

## 2019-06-17 PROCEDURE — 74011250637 HC RX REV CODE- 250/637: Performed by: SURGERY

## 2019-06-17 PROCEDURE — 80048 BASIC METABOLIC PNL TOTAL CA: CPT

## 2019-06-17 RX ADMIN — HYDROMORPHONE HYDROCHLORIDE 1 MG: 1 INJECTION, SOLUTION INTRAMUSCULAR; INTRAVENOUS; SUBCUTANEOUS at 16:14

## 2019-06-17 RX ADMIN — HEPARIN SODIUM 5000 UNITS: 5000 INJECTION INTRAVENOUS; SUBCUTANEOUS at 00:29

## 2019-06-17 RX ADMIN — Medication 10 ML: at 05:55

## 2019-06-17 RX ADMIN — IPRATROPIUM BROMIDE AND ALBUTEROL SULFATE 3 ML: .5; 3 SOLUTION RESPIRATORY (INHALATION) at 20:01

## 2019-06-17 RX ADMIN — POTASSIUM CHLORIDE AND SODIUM CHLORIDE: 450; 150 INJECTION, SOLUTION INTRAVENOUS at 07:27

## 2019-06-17 RX ADMIN — MEMANTINE HYDROCHLORIDE 10 MG: 10 TABLET ORAL at 09:19

## 2019-06-17 RX ADMIN — HYDROMORPHONE HYDROCHLORIDE 1 MG: 1 INJECTION, SOLUTION INTRAMUSCULAR; INTRAVENOUS; SUBCUTANEOUS at 09:18

## 2019-06-17 RX ADMIN — IPRATROPIUM BROMIDE AND ALBUTEROL SULFATE 3 ML: .5; 3 SOLUTION RESPIRATORY (INHALATION) at 12:44

## 2019-06-17 RX ADMIN — MEMANTINE HYDROCHLORIDE 10 MG: 10 TABLET ORAL at 19:30

## 2019-06-17 RX ADMIN — Medication 10 ML: at 22:17

## 2019-06-17 RX ADMIN — HEPARIN SODIUM 5000 UNITS: 5000 INJECTION INTRAVENOUS; SUBCUTANEOUS at 08:25

## 2019-06-17 RX ADMIN — SERTRALINE HYDROCHLORIDE 150 MG: 50 TABLET ORAL at 09:19

## 2019-06-17 RX ADMIN — PIPERACILLIN AND TAZOBACTAM 3.38 G: 3; .375 INJECTION, POWDER, FOR SOLUTION INTRAVENOUS at 22:29

## 2019-06-17 RX ADMIN — VALPROATE SODIUM 125 MG: 100 INJECTION, SOLUTION INTRAVENOUS at 16:14

## 2019-06-17 RX ADMIN — CARBIDOPA AND LEVODOPA 0.5 TABLET: 25; 100 TABLET ORAL at 22:21

## 2019-06-17 RX ADMIN — Medication 10 ML: at 14:33

## 2019-06-17 RX ADMIN — IPRATROPIUM BROMIDE AND ALBUTEROL SULFATE 3 ML: .5; 3 SOLUTION RESPIRATORY (INHALATION) at 16:35

## 2019-06-17 RX ADMIN — HYDROMORPHONE HYDROCHLORIDE 1 MG: 1 INJECTION, SOLUTION INTRAMUSCULAR; INTRAVENOUS; SUBCUTANEOUS at 21:39

## 2019-06-17 RX ADMIN — VALPROATE SODIUM 125 MG: 100 INJECTION, SOLUTION INTRAVENOUS at 09:20

## 2019-06-17 RX ADMIN — VALPROATE SODIUM 125 MG: 100 INJECTION, SOLUTION INTRAVENOUS at 21:30

## 2019-06-17 RX ADMIN — HEPARIN SODIUM 5000 UNITS: 5000 INJECTION INTRAVENOUS; SUBCUTANEOUS at 16:15

## 2019-06-17 RX ADMIN — DONEPEZIL HYDROCHLORIDE 10 MG: 10 TABLET, FILM COATED ORAL at 09:19

## 2019-06-17 RX ADMIN — IPRATROPIUM BROMIDE AND ALBUTEROL SULFATE 3 ML: .5; 3 SOLUTION RESPIRATORY (INHALATION) at 23:54

## 2019-06-17 RX ADMIN — FAMOTIDINE 20 MG: 10 INJECTION, SOLUTION INTRAVENOUS at 21:50

## 2019-06-17 RX ADMIN — PIPERACILLIN AND TAZOBACTAM 3.38 G: 3; .375 INJECTION, POWDER, FOR SOLUTION INTRAVENOUS at 14:32

## 2019-06-17 RX ADMIN — HYDROMORPHONE HYDROCHLORIDE 1 MG: 1 INJECTION, SOLUTION INTRAMUSCULAR; INTRAVENOUS; SUBCUTANEOUS at 04:59

## 2019-06-17 RX ADMIN — IPRATROPIUM BROMIDE AND ALBUTEROL SULFATE 3 ML: .5; 3 SOLUTION RESPIRATORY (INHALATION) at 08:09

## 2019-06-17 RX ADMIN — CARBIDOPA AND LEVODOPA 0.5 TABLET: 25; 100 TABLET ORAL at 16:14

## 2019-06-17 RX ADMIN — FAMOTIDINE 20 MG: 10 INJECTION, SOLUTION INTRAVENOUS at 08:25

## 2019-06-17 RX ADMIN — POTASSIUM CHLORIDE AND SODIUM CHLORIDE: 450; 150 INJECTION, SOLUTION INTRAVENOUS at 17:33

## 2019-06-17 RX ADMIN — CARBIDOPA AND LEVODOPA 0.5 TABLET: 25; 100 TABLET ORAL at 09:19

## 2019-06-17 RX ADMIN — IPRATROPIUM BROMIDE AND ALBUTEROL SULFATE 3 ML: .5; 3 SOLUTION RESPIRATORY (INHALATION) at 03:35

## 2019-06-17 RX ADMIN — LORAZEPAM 2 MG: 2 INJECTION INTRAMUSCULAR; INTRAVENOUS at 20:30

## 2019-06-17 RX ADMIN — PIPERACILLIN AND TAZOBACTAM 3.38 G: 3; .375 INJECTION, POWDER, FOR SOLUTION INTRAVENOUS at 05:52

## 2019-06-17 NOTE — PROGRESS NOTES
0730: Bedside and Verbal shift change report given to Elisa Colin RN (oncoming nurse) by Celine High RN (offgoing nurse). Report included the following information SBAR, Kardex, ED Summary, OR Summary, Procedure Summary, Intake/Output, MAR, Accordion, Recent Results, Med Rec Status and Cardiac Rhythm Sinus Tach. 0830: Reached out to general surgery regarding NPO status with how to proceed with numerous PO meds ordered. Received orders from Mikaela Fritz NP to give PO meds via NG tube and clamp for one hour, then resume continuous suction.

## 2019-06-17 NOTE — PROGRESS NOTES
Reason for Admission:   Appendicitis with perforation                   RRAT Score:       9              Plan for utilizing home health:    Unable to assess currently. Pt still requiring ICU care after surgery for Appendicitis with perforation. Current Advanced Directive/Advance Care Plan:   Chart indicates he prepared an MPOA with 's office in Spring 2015 but document is not scanned in chart. Transition of Care Plan:     Pt has family. Will need to assess once pt is able to participate with therapy, pending medical progress and discussion with family.   Doug 13, MSW

## 2019-06-17 NOTE — PROGRESS NOTES
[facility] General Surgery    POD #1    Subjective     NPO, NGT, still confused, pain seems controlled    Objective     Patient Vitals for the past 24 hrs:   Temp Pulse Resp BP SpO2   06/17/19 0900  (!) 102 21 123/85 100 %   06/17/19 0806     100 %   06/17/19 0800 99.5 °F (37.5 °C) (!) 107 21 113/78 92 %   06/17/19 0700  (!) 104 17 132/72 (!) 89 %   06/17/19 0600  (!) 107 25 119/71 92 %   06/17/19 0500  (!) 109 27 106/81 96 %   06/17/19 0400 99.7 °F (37.6 °C) (!) 108 17 104/74 94 %   06/17/19 0335     94 %   06/17/19 0300  (!) 106 25 (!) 121/102 94 %   06/17/19 0201  (!) 105 19 121/58 93 %   06/17/19 0200  (!) 107 19  91 %   06/17/19 0100  (!) 104 20 118/68 93 %   06/17/19 0000 99 °F (37.2 °C) 100 19  96 %   06/16/19 2351     96 %   06/16/19 2315  (!) 106 19 112/62 96 %   06/16/19 2300  (!) 103 19 96/70 96 %   06/16/19 2230  (!) 106 17 98/54 94 %   06/16/19 2200  (!) 109 17  94 %   06/16/19 2100  (!) 114 18 114/68 93 %   06/16/19 2000 99.1 °F (37.3 °C) (!) 116 27 104/61 96 %   06/16/19 1930     94 %   06/16/19 1909  (!) 121 (!) 33 114/72 93 %   06/16/19 1908  (!) 125 28 110/64 91 %   06/16/19 1900  (!) 107 19 114/72 97 %   06/16/19 1800  (!) 103 18  99 %   06/16/19 1700  (!) 101 17 115/72 98 %   06/16/19 1600 99.2 °F (37.3 °C) (!) 107 20 122/64 100 %   06/16/19 1537     100 %   06/16/19 1500  (!) 101 19 109/74 100 %   06/16/19 1400  (!) 106 17 115/65 99 %   06/16/19 1333    123/60    06/16/19 1300  (!) 101 20 (!) 88/52    06/16/19 1200 99.7 °F (37.6 °C) (!) 108 (!) 31 122/65 99 %   06/16/19 1146     98 %   06/16/19 1100  (!) 110 18 108/72 100 %   06/16/19 1000  (!) 102 18 108/66 99 %       Date 06/16/19 0700 - 06/17/19 0659 06/17/19 0700 - 06/18/19 0659   Shift 8435-1246 1655-0918 24 Hour Total 0826-5035 1612-8518 24 Hour Total   INTAKE   I.V.(mL/kg/hr) 1441.3(1) 1825(1.3) 3266.3(1.2) 600  600     Volume (0.45% sodium chloride with KCl 20 mEq/L infusion) 1191.3 1625 2816.3 500  500     Volume (piperacillin-tazobactam (ZOSYN) 3.375 g in 0.9% sodium chloride (MBP/ADV) 100 mL)  200 200 100  100     Volume (vancomycin (VANCOCIN) 1250 mg in  ml infusion) 250  250      Shift Total(mL/kg) 1441.3(12.3) 1825(15.5) 3266.3(27.8) 600(5.1)  600(5.1)   OUTPUT   Urine(mL/kg/hr) 450(0.3) 550(0.4) 1000(0.4) 250  250     Urine Output (mL) (Urinary Catheter 06/14/19)  250  250   Emesis/NG output 250 350 600        Output (ml) (Nasogastric Tube 06/15/19) 250 350 600      Drains 40 25 65 10  10     Output (ml) (Keith Garcia #1 06/16/19 Left; Lower Abdomen) 40 25 65 10  10   Shift Total(mL/kg) 740(6.3) 925(7.9) 1759(91.6) 260(2.2)  260(2.2)   .3 900 1601.3 340  340   Weight (kg) 116.8 117.5 117.5 117.5 117.5 117.5       PE  Pulm - CTAB  CV - RRR  Abd - soft, distended, BS hypo, incisions c/d/i, ARIANA ss    Labs  Recent Results (from the past 12 hour(s))   METABOLIC PANEL, BASIC    Collection Time: 06/17/19  4:30 AM   Result Value Ref Range    Sodium 144 136 - 145 mmol/L    Potassium 3.8 3.5 - 5.1 mmol/L    Chloride 113 (H) 97 - 108 mmol/L    CO2 25 21 - 32 mmol/L    Anion gap 6 5 - 15 mmol/L    Glucose 84 65 - 100 mg/dL    BUN 24 (H) 6 - 20 MG/DL    Creatinine 1.27 0.70 - 1.30 MG/DL    BUN/Creatinine ratio 19 12 - 20      GFR est AA >60 >60 ml/min/1.73m2    GFR est non-AA 56 (L) >60 ml/min/1.73m2    Calcium 8.0 (L) 8.5 - 10.1 MG/DL   CBC W/O DIFF    Collection Time: 06/17/19  4:30 AM   Result Value Ref Range    WBC 10.4 4.1 - 11.1 K/uL    RBC 2.98 (L) 4.10 - 5.70 M/uL    HGB 9.1 (L) 12.1 - 17.0 g/dL    HCT 29.1 (L) 36.6 - 50.3 %    MCV 97.7 80.0 - 99.0 FL    MCH 30.5 26.0 - 34.0 PG    MCHC 31.3 30.0 - 36.5 g/dL    RDW 14.1 11.5 - 14.5 %    PLATELET 721 622 - 130 K/uL    MPV 10.8 8.9 - 12.9 FL    NRBC 0.0 0  WBC    ABSOLUTE NRBC 0.00 0.00 - 0.01 K/uL         Assessment     Casimiro Luna is a 72 y. o.yr old male s/p laparoscopic appendectomy    Plan     Keeping the NGT in place had 1000cc out the last day  Continue IVF  Continue howard catheter  PPI for some darker NGT output  Tx from ICU        Awilda Guerrero MD

## 2019-06-18 LAB
ANION GAP SERPL CALC-SCNC: 7 MMOL/L (ref 5–15)
BASOPHILS # BLD: 0 K/UL (ref 0–0.1)
BASOPHILS NFR BLD: 0 % (ref 0–1)
BUN SERPL-MCNC: 18 MG/DL (ref 6–20)
BUN/CREAT SERPL: 18 (ref 12–20)
CALCIUM SERPL-MCNC: 7.9 MG/DL (ref 8.5–10.1)
CHLORIDE SERPL-SCNC: 111 MMOL/L (ref 97–108)
CO2 SERPL-SCNC: 24 MMOL/L (ref 21–32)
CREAT SERPL-MCNC: 0.98 MG/DL (ref 0.7–1.3)
DIFFERENTIAL METHOD BLD: ABNORMAL
EOSINOPHIL # BLD: 0.2 K/UL (ref 0–0.4)
EOSINOPHIL NFR BLD: 2 % (ref 0–7)
ERYTHROCYTE [DISTWIDTH] IN BLOOD BY AUTOMATED COUNT: 14 % (ref 11.5–14.5)
GLUCOSE SERPL-MCNC: 76 MG/DL (ref 65–100)
HCT VFR BLD AUTO: 31.7 % (ref 36.6–50.3)
HGB BLD-MCNC: 10 G/DL (ref 12.1–17)
IMM GRANULOCYTES # BLD AUTO: 0.1 K/UL (ref 0–0.04)
IMM GRANULOCYTES NFR BLD AUTO: 1 % (ref 0–0.5)
LACTATE SERPL-SCNC: 0.9 MMOL/L (ref 0.4–2)
LYMPHOCYTES # BLD: 0.9 K/UL (ref 0.8–3.5)
LYMPHOCYTES NFR BLD: 8 % (ref 12–49)
MCH RBC QN AUTO: 30.4 PG (ref 26–34)
MCHC RBC AUTO-ENTMCNC: 31.5 G/DL (ref 30–36.5)
MCV RBC AUTO: 96.4 FL (ref 80–99)
MONOCYTES # BLD: 0.9 K/UL (ref 0–1)
MONOCYTES NFR BLD: 9 % (ref 5–13)
NEUTS SEG # BLD: 8.4 K/UL (ref 1.8–8)
NEUTS SEG NFR BLD: 80 % (ref 32–75)
NRBC # BLD: 0 K/UL (ref 0–0.01)
NRBC BLD-RTO: 0 PER 100 WBC
PLATELET # BLD AUTO: 222 K/UL (ref 150–400)
PMV BLD AUTO: 10.4 FL (ref 8.9–12.9)
POTASSIUM SERPL-SCNC: 3.8 MMOL/L (ref 3.5–5.1)
RBC # BLD AUTO: 3.29 M/UL (ref 4.1–5.7)
SODIUM SERPL-SCNC: 142 MMOL/L (ref 136–145)
WBC # BLD AUTO: 10.5 K/UL (ref 4.1–11.1)

## 2019-06-18 PROCEDURE — 65660000000 HC RM CCU STEPDOWN

## 2019-06-18 PROCEDURE — 36415 COLL VENOUS BLD VENIPUNCTURE: CPT

## 2019-06-18 PROCEDURE — 74011250636 HC RX REV CODE- 250/636: Performed by: SURGERY

## 2019-06-18 PROCEDURE — 92610 EVALUATE SWALLOWING FUNCTION: CPT | Performed by: SPEECH-LANGUAGE PATHOLOGIST

## 2019-06-18 PROCEDURE — 74011000250 HC RX REV CODE- 250: Performed by: SURGERY

## 2019-06-18 PROCEDURE — 80048 BASIC METABOLIC PNL TOTAL CA: CPT

## 2019-06-18 PROCEDURE — 94640 AIRWAY INHALATION TREATMENT: CPT

## 2019-06-18 PROCEDURE — 83605 ASSAY OF LACTIC ACID: CPT

## 2019-06-18 PROCEDURE — 77030018798 HC PMP KT ENTRL FED COVD -A

## 2019-06-18 PROCEDURE — 74011250637 HC RX REV CODE- 250/637: Performed by: SURGERY

## 2019-06-18 PROCEDURE — 85025 COMPLETE CBC W/AUTO DIFF WBC: CPT

## 2019-06-18 PROCEDURE — 77010033678 HC OXYGEN DAILY

## 2019-06-18 PROCEDURE — 74011000258 HC RX REV CODE- 258: Performed by: SURGERY

## 2019-06-18 RX ADMIN — FAMOTIDINE 20 MG: 10 INJECTION, SOLUTION INTRAVENOUS at 20:48

## 2019-06-18 RX ADMIN — Medication 10 ML: at 13:48

## 2019-06-18 RX ADMIN — DONEPEZIL HYDROCHLORIDE 10 MG: 10 TABLET, FILM COATED ORAL at 09:07

## 2019-06-18 RX ADMIN — ASCORBIC ACID: 500 INJECTION, SOLUTION INTRAMUSCULAR; INTRAVENOUS; SUBCUTANEOUS at 18:07

## 2019-06-18 RX ADMIN — IPRATROPIUM BROMIDE AND ALBUTEROL SULFATE 3 ML: .5; 3 SOLUTION RESPIRATORY (INHALATION) at 21:16

## 2019-06-18 RX ADMIN — VALPROATE SODIUM 125 MG: 100 INJECTION, SOLUTION INTRAVENOUS at 03:45

## 2019-06-18 RX ADMIN — ACETAMINOPHEN 1000 MG: 650 SOLUTION ORAL at 21:10

## 2019-06-18 RX ADMIN — PIPERACILLIN AND TAZOBACTAM 3.38 G: 3; .375 INJECTION, POWDER, FOR SOLUTION INTRAVENOUS at 20:49

## 2019-06-18 RX ADMIN — IPRATROPIUM BROMIDE AND ALBUTEROL SULFATE 3 ML: .5; 3 SOLUTION RESPIRATORY (INHALATION) at 07:43

## 2019-06-18 RX ADMIN — VALPROATE SODIUM 125 MG: 100 INJECTION, SOLUTION INTRAVENOUS at 21:25

## 2019-06-18 RX ADMIN — SERTRALINE HYDROCHLORIDE 150 MG: 50 TABLET ORAL at 09:06

## 2019-06-18 RX ADMIN — POTASSIUM CHLORIDE AND SODIUM CHLORIDE: 450; 150 INJECTION, SOLUTION INTRAVENOUS at 17:52

## 2019-06-18 RX ADMIN — HYDROMORPHONE HYDROCHLORIDE 1 MG: 1 INJECTION, SOLUTION INTRAMUSCULAR; INTRAVENOUS; SUBCUTANEOUS at 10:18

## 2019-06-18 RX ADMIN — IPRATROPIUM BROMIDE AND ALBUTEROL SULFATE 3 ML: .5; 3 SOLUTION RESPIRATORY (INHALATION) at 03:43

## 2019-06-18 RX ADMIN — CARBIDOPA AND LEVODOPA 0.5 TABLET: 25; 100 TABLET ORAL at 16:29

## 2019-06-18 RX ADMIN — HYDROMORPHONE HYDROCHLORIDE 1 MG: 1 INJECTION, SOLUTION INTRAMUSCULAR; INTRAVENOUS; SUBCUTANEOUS at 20:47

## 2019-06-18 RX ADMIN — POTASSIUM CHLORIDE AND SODIUM CHLORIDE: 450; 150 INJECTION, SOLUTION INTRAVENOUS at 09:09

## 2019-06-18 RX ADMIN — CARBIDOPA AND LEVODOPA 0.5 TABLET: 25; 100 TABLET ORAL at 09:06

## 2019-06-18 RX ADMIN — MEMANTINE HYDROCHLORIDE 10 MG: 10 TABLET ORAL at 09:06

## 2019-06-18 RX ADMIN — Medication 10 ML: at 21:30

## 2019-06-18 RX ADMIN — Medication 10 ML: at 05:45

## 2019-06-18 RX ADMIN — POTASSIUM CHLORIDE AND SODIUM CHLORIDE: 450; 150 INJECTION, SOLUTION INTRAVENOUS at 01:17

## 2019-06-18 RX ADMIN — HEPARIN SODIUM 5000 UNITS: 5000 INJECTION INTRAVENOUS; SUBCUTANEOUS at 01:27

## 2019-06-18 RX ADMIN — HYDROMORPHONE HYDROCHLORIDE 1 MG: 1 INJECTION, SOLUTION INTRAMUSCULAR; INTRAVENOUS; SUBCUTANEOUS at 04:05

## 2019-06-18 RX ADMIN — IPRATROPIUM BROMIDE AND ALBUTEROL SULFATE 3 ML: .5; 3 SOLUTION RESPIRATORY (INHALATION) at 11:44

## 2019-06-18 RX ADMIN — VALPROATE SODIUM 125 MG: 100 INJECTION, SOLUTION INTRAVENOUS at 09:08

## 2019-06-18 RX ADMIN — HEPARIN SODIUM 5000 UNITS: 5000 INJECTION INTRAVENOUS; SUBCUTANEOUS at 09:06

## 2019-06-18 RX ADMIN — FAMOTIDINE 20 MG: 10 INJECTION, SOLUTION INTRAVENOUS at 09:06

## 2019-06-18 RX ADMIN — ZONISAMIDE 100 MG: 100 CAPSULE ORAL at 15:20

## 2019-06-18 RX ADMIN — VALPROATE SODIUM 125 MG: 100 INJECTION, SOLUTION INTRAVENOUS at 16:29

## 2019-06-18 RX ADMIN — PIPERACILLIN AND TAZOBACTAM 3.38 G: 3; .375 INJECTION, POWDER, FOR SOLUTION INTRAVENOUS at 13:47

## 2019-06-18 RX ADMIN — MEMANTINE HYDROCHLORIDE 10 MG: 10 TABLET ORAL at 17:52

## 2019-06-18 RX ADMIN — PIPERACILLIN AND TAZOBACTAM 3.38 G: 3; .375 INJECTION, POWDER, FOR SOLUTION INTRAVENOUS at 05:43

## 2019-06-18 RX ADMIN — HEPARIN SODIUM 5000 UNITS: 5000 INJECTION INTRAVENOUS; SUBCUTANEOUS at 17:13

## 2019-06-18 RX ADMIN — CARBIDOPA AND LEVODOPA 0.5 TABLET: 25; 100 TABLET ORAL at 20:48

## 2019-06-18 RX ADMIN — IPRATROPIUM BROMIDE AND ALBUTEROL SULFATE 3 ML: .5; 3 SOLUTION RESPIRATORY (INHALATION) at 15:31

## 2019-06-18 NOTE — PROGRESS NOTES
Problem: Dysphagia (Adult)  Goal: *Acute Goals and Plan of Care (Insert Text)  Description  Speech therapy goals  initiated 6/18/2019   1. Patient will participate in re-assessment of swallow function within 7 days    Outcome: Progressing Towards Goal     [address] EVALUATION  Patient: Reginald Shin (44 y.o. male)  Date: 6/18/2019  Primary Diagnosis: Appendicitis with perforation [K35.32]  Appendicitis with perforation [K35.32]  Appendicitis with perforation [K35.32]  Procedure(s) (LRB):  CYSTOSCOPY  INSERTION OF RIGHT URETERAL STENT, LAPAROSCOPY GENERAL DIAGNOSTIC, ,APPENDECTOMY, (N/A)  Cystoscopy Right Ureteral Stent  Insertion (N/A) 3 Days Post-Op   Precautions: aspiration       ASSESSMENT :  Based on the objective data described below, the patient presents with severe oral dysphagia characterized by max cues to accept ice chip from spoon with ice repeatedly falling from his mouth after acceptance and absent acceptance of straw sips with no attempt to suck despite repeated verbal and tactile cues and no response to water rubbed on his lips. Mental status currently inappropriate for consideration of PO and risks for aspiration are high related to no recognition of PO items. Patient will benefit from skilled intervention to address the above impairments. Patient?s rehabilitation potential is considered to be Guarded  Factors which may influence rehabilitation potential include:   ? None noted  ? Mental ability/status  ? Medical condition  ? Home/family situation and support systems  ? Safety awareness  ? Pain tolerance/management  ? Other:      PLAN :  Recommendations and Planned Interventions:  --recommend NPO with meds via non-oral route. Will follow for re-assessment of swallow function as mental status continues to improve.    Frequency/Duration: Patient will be followed by speech-language pathology 3 times a week to address goals. Discharge Recommendations: To Be Determined     SUBJECTIVE:   Patient stated ?water! ? Norma Tinoco When told I brought water for him to drink. Private caregiver at bedside. OBJECTIVE:     Past Medical History:   Diagnosis Date    Anxiety disorder     Depression     Essential hypertension     Memory disorder     Neurological disorder      Past Surgical History:   Procedure Laterality Date    HX OTHER SURGICAL      left big toe nail removed    HX VASECTOMY  1975     Prior Level of Function/Home Situation:      Diet prior to admission: regular  Current Diet:  NPO    Cognitive and Communication Status:  Neurologic State: Alert, Drowsy, Restless  Orientation Level: Oriented to person  Cognition: Decreased attention/concentration, Decreased command following, Memory loss, Impulsive, Poor safety awareness  Perception: Cues to maintain midline in sitting  Perseveration: No perseveration noted  Safety/Judgement: Not assessed  Oral Assessment:  Oral Assessment  Labial: Other (comment)(did not follow commands to assess )  P.O. Trials:  Patient Position: upright in bed   Vocal quality prior to P.O.: No impairment  Consistency Presented: Thin liquid; Ice chips  How Presented: SLP-fed/presented;Spoon;Straw     Bolus Acceptance: Impaired(max cuse to accept; unable to suck on straw )  Bolus Formation/Control: Impaired  Type of Impairment: Anterior;Spillage(ice fell immediately from mouth )  Propulsion: Absent        Laryngeal Elevation: (unable to assess given oral phase )                   Oral Phase Severity: Severe  Pharyngeal Phase Severity : (unable to assess given oral dysphagia)    NOMS:   The NOMS functional outcome measure was used to quantify this patient's level of swallowing impairment.   Based on the NOMS, the patient was determined to be at level 1 for swallow function       NOMS Swallowing Levels:  Level 1 (CN): NPO  Level 2 (CM): NPO but takes consistency in therapy  Level 3 (CL): Takes less than 50% of nutrition p.o. and continues with nonoral feedings; and/or safe with mod cues; and/or max diet restriction  Level 4 (CK): Safe swallow but needs mod cues; and/or mod diet restriction; and/or still requires some nonoral feeding/supplements  Level 5 (CJ): Safe swallow with min diet restriction; and/or needs min cues  Level 6 (CI): Independent with p.o.; rare cues; usually self cues; may need to avoid some foods or needs extra time  Level 7 ([address] [place] [address]): Independent for all p.o.  EUGENIO. (2003). National Outcomes Measurement System (NOMS): Adult Speech-Language Pathology User's Guide. Pain:  Pain Scale 1: Adult Nonverbal Pain Scale        After treatment:   ?            Patient left in no apparent distress sitting up in chair  ? Patient left in no apparent distress in bed  ? Call bell left within reach  ? Nursing notified  ? Caregiver present  ? Bed alarm activated    COMMUNICATION/EDUCATION:   The patient?s plan of care including recommendations, planned interventions, and recommended diet changes were discussed with: Registered Nurse. ? Patient/family have participated as able in goal setting and plan of care. ?            Patient/family agree to work toward stated goals and plan of care. ?            Patient understands intent and goals of therapy, but is neutral about his/her participation. ? Patient is unable to participate in goal setting and plan of care. Thank you for this referral.  Eliel Matos M.CD.  CCC-SLP   Time Calculation: 11 mins

## 2019-06-18 NOTE — PROGRESS NOTES
Bedside and Verbal shift change report given to Waldemar Reyes (oncoming nurse) by Dempsey Dakin (offgoing nurse). Report included the following information SBAR, Kardex, Intake/Output, MAR, Accordion, Recent Results, Med Rec Status, Cardiac Rhythm sinus tach and Alarm Parameters .

## 2019-06-18 NOTE — PROGRESS NOTES
[facility] General Surgery    POD #2    Subjective     NPO, NGT, no N/V, resting well today, howard    Objective     Patient Vitals for the past 24 hrs:   Temp Pulse Resp BP SpO2   06/18/19 0743     97 %   06/18/19 0700  94 22 140/84 98 %   06/18/19 0600  96 19 134/76 99 %   06/18/19 0500  (!) 105 25 135/89 100 %   06/18/19 0400 99.9 °F (37.7 °C) 100 26 148/84 100 %   06/18/19 0300  97 26 136/83 98 %   06/18/19 0200  99 22 140/81 98 %   06/18/19 0100  100 14 143/77 97 %   06/18/19 0000 100.1 °F (37.8 °C) 95 22 127/78 100 %   06/17/19 2354     97 %   06/17/19 2300  97 19 124/75 98 %   06/17/19 2200  (!) 105 23 140/81 97 %   06/17/19 2100  (!) 108 23 140/67 97 %   06/17/19 2002     100 %   06/17/19 2000 100 °F (37.8 °C) 100 30 147/86 100 %   06/17/19 1900  (!) 105 19 (!) 145/96 100 %   06/17/19 1637     100 %   06/17/19 1600 100 °F (37.8 °C) (!) 102 26 (!) 135/108 100 %   06/17/19 1500  97 21  99 %   06/17/19 1400  (!) 102 21 148/82 99 %   06/17/19 1300  (!) 104 24 142/75 92 %   06/17/19 1245     91 %   06/17/19 1200 99.4 °F (37.4 °C) 100 19 135/81 92 %   06/17/19 1100  (!) 101 18 (!) 132/97 96 %   06/17/19 1000  (!) 105 15 147/74 97 %   06/17/19 0900  (!) 102 21 123/85 100 %       Date 06/17/19 0700 - 06/18/19 0659 06/18/19 0700 - 06/19/19 0659   Shift 4409-4462 6508-0657 24 Hour Total 0439-9880 8719-5580 24 Hour Total   INTAKE   I.V.(mL/kg/hr) 1625(1.2) 1750(1.2) 3375(1.2)        Volume (0.45% sodium chloride with KCl 20 mEq/L infusion) 1375 1500 2875        Volume (piperacillin-tazobactam (ZOSYN) 3.375 g in 0.9% sodium chloride (MBP/ADV) 100 mL) 200 100 300        Volume (valproate (DEPACON) 125 mg in 0.9% sodium chloride 50 mL IVPB) 50 150 200      Shift Total(mL/kg) 1625(13.8) 1750(14.9) 3375(28.7)      OUTPUT   Urine(mL/kg/hr) 850(0.6) 1200(0.9) 2050(0.7)        Urine Output (mL) (Urinary Catheter 06/14/19) 850 1200 2050 Emesis/NG output 300 150 450        Output (ml) (Nasogastric Tube 06/15/19) 300 150 450      Drains 30 20 50        Output (ml) (Mauri Libman #1 06/16/19 Left; Lower Abdomen) 30 20 50      Shift Total(mL/kg) 1180(10) 1370(11.7) 2550(21.7)       380 825      Weight (kg) 117.5 117.5 117.5 118 118 118       PE  Pulm - CTAB  CV - RRR  Abd - soft, mild distention but less so, BS present, minimal TTP, incisions c/d/i, ARIANA serous    Labs  Recent Results (from the past 12 hour(s))   CBC WITH AUTOMATED DIFF    Collection Time: 06/18/19  3:39 AM   Result Value Ref Range    WBC 10.5 4.1 - 11.1 K/uL    RBC 3.29 (L) 4.10 - 5.70 M/uL    HGB 10.0 (L) 12.1 - 17.0 g/dL    HCT 31.7 (L) 36.6 - 50.3 %    MCV 96.4 80.0 - 99.0 FL    MCH 30.4 26.0 - 34.0 PG    MCHC 31.5 30.0 - 36.5 g/dL    RDW 14.0 11.5 - 14.5 %    PLATELET 161 526 - 382 K/uL    MPV 10.4 8.9 - 12.9 FL    NRBC 0.0 0  WBC    ABSOLUTE NRBC 0.00 0.00 - 0.01 K/uL    NEUTROPHILS 80 (H) 32 - 75 %    LYMPHOCYTES 8 (L) 12 - 49 %    MONOCYTES 9 5 - 13 %    EOSINOPHILS 2 0 - 7 %    BASOPHILS 0 0 - 1 %    IMMATURE GRANULOCYTES 1 (H) 0.0 - 0.5 %    ABS. NEUTROPHILS 8.4 (H) 1.8 - 8.0 K/UL    ABS. LYMPHOCYTES 0.9 0.8 - 3.5 K/UL    ABS. MONOCYTES 0.9 0.0 - 1.0 K/UL    ABS. EOSINOPHILS 0.2 0.0 - 0.4 K/UL    ABS. BASOPHILS 0.0 0.0 - 0.1 K/UL    ABS. IMM. GRANS. 0.1 (H) 0.00 - 0.04 K/UL    DF AUTOMATED     METABOLIC PANEL, BASIC    Collection Time: 06/18/19  3:39 AM   Result Value Ref Range    Sodium 142 136 - 145 mmol/L    Potassium 3.8 3.5 - 5.1 mmol/L    Chloride 111 (H) 97 - 108 mmol/L    CO2 24 21 - 32 mmol/L    Anion gap 7 5 - 15 mmol/L    Glucose 76 65 - 100 mg/dL    BUN 18 6 - 20 MG/DL    Creatinine 0.98 0.70 - 1.30 MG/DL    BUN/Creatinine ratio 18 12 - 20      GFR est AA >60 >60 ml/min/1.73m2    GFR est non-AA >60 >60 ml/min/1.73m2    Calcium 7.9 (L) 8.5 - 10.1 MG/DL         Assessment     Jaylyn Newton is a 72 y. o.yr old male s/p laparoscopic appendectomy for perforated appendicitis and R nephrolithiasis    Plan     He is slowly improving  NGT output has decreased and no longer bilious  I will remove the NGT today  Speech evaluation today, possibly start sips of clears  He will need PPN today, hopefully will start PO soon and ween off PPN  Continue howard for today, may remove tomorrow  Continue IV abx  Tx stepdown today when bed available      Anat Smith MD

## 2019-06-18 NOTE — PROGRESS NOTES
for initial visit. Pt was resting and no family present at this time. Please contact [phone] [facility] for further support.  follow up as needed.      Sarah Cabrera, [facility]   [phone]

## 2019-06-18 NOTE — CDMP QUERY
Patient admitted with appenditicitis, noted to have fever, tachycardia and elevated WBCs. If possible, please document in progress notes and d/c summary if you are evaluating and/or treating any of the following: 
 
 
=>  Sepsis 
=> Other Explanation of clinical findings 
=>  Clinically Undetermined (no explanation for clinical findings) The medical record reflects the following: 
 
   Risk Factors: noted appendicitis, developed peritonitis Clinical Indicators: Pt presented more confused than normal (hx dementia), WBCs 19.5, Lactic acid 3.2. Developed fever on 06/15 of 100.8 & HR . Noted in documentation that pt had developed peritonitis & per Op report-abscess in RLQ at perforation Treatment: OR for appendectomy, IV abx (Zosyn), 3300 NS fl bolus + cont. IVF, lactic acid monitoring Thank you, Karlie Sousa, RN, MSN, [facility] [address], [address] 
[phone]

## 2019-06-18 NOTE — CDMP QUERY
(2 of 2 queries) Patient admitted with appendicitis, noted to have elevated Cr. If possible, please document in progress notes and d/c summary if you are evaluating and/or treating any of the following: 
 
=>  Acute kidney injury (POA) 
=> Other Explanation of clinical findings 
=> Clinically Undetermined (no explanation for clinical findings) The medical record reflects the following: 
 
   Risk Factors: Appendicitis w/perforation Clinical Indicators: Pt noted to have Cr 2.98 w/GFR 21. Cr down to 0.98 w/GFR >60 Treatment: 3300 NS fl bolus, lab monitoring, OR for appendectomy Thank you, Anjana Corado, RN, MSN, [facility] [address], [address] 
[phone]

## 2019-06-18 NOTE — PROGRESS NOTES
NUTRITION COMPLETE ASSESSMENT    RECOMMENDATIONS:   Adjust tube feeding formula to Osmolite 1.5 (non-fiber)      -As tolerated advance to goal: Osmolite 1.5 @ 55 ml/hr with 1 packet Prosource tid and 170 ml water flush q 4 hr    Bowel regimen     Interventions/Plan:   Food/Nutrient Delivery: Enteral and Parenteral Nutrition    Assessment:   Reason for Assessment:   [x]NPO/Clear Liquid x 5 days    PPN: 4.25% AA D10 @ 42 ml/hr  No lipid  Tube Feeding: Jevity 1.5 @ 10 ml/hr with 30 ml water flush q 4 hr  Diet: NPO  Nutritionally Significant Medications: [x] Reviewed & Includes: 1/2 NS with KICL @ 125 ml/hr (decrease to 83 ml/hr once PPN hung)  Meal Intake: No data found. Subjective:  Pt sleeping. No family @ bedside. Objective:  Mr Essence Diego was admitted with appendicitis with perforation. Noted: 6/15-6/16: cysto with ureteral stent (2/2 stone) and lap appendectomy; severe dysphagia per SLP. PMHx: HTN, Dementia, others noted. Patient appears well-nourished; no weight loss noted per trends in EHR. Labs WNL; agree with checking magnesium and phosphorus with BMP tomorrow. May be at risk of refeeding. PPN ordered today and will provide 1000 ml, ~43 gm protein and 510 calories per day. Since unable to advance diet-trophic tube feeding was also ordered. May wish to consider transitioning to a non-fiber formula-easier to digest. Noted last BM a week ago; consider adding a bowel regimen. Suggest discontinuing PPN if pt tolerates trophic tube feeding then slowly advance to goal.     Suggest: Osmolite 1.5 @ 55 ml/hr with 1 packet Prosource tid and 170 ml water flush q 4 hr. This will provide 1320 ml, 2160 calories, 128 gm protein and 2200 ml free water (tube feeding/flush) per day to meet estimated needs. Estimated Nutrition Needs:   Kcals/day: 1900 Kcals/day(0961-2856 (MSJ x 1.0-1.2)  Protein: 113 g(113-136 (1.0-1.2g/kg)  Fluid: (1 ml/kcal)  Based On:  [place]  [place]  Weight Used: Actual wt(113 kg)    Pt expected to meet estimated nutrient needs:  [x]   Yes via nutrition support    []  No  [] Unable to predict at this time    Nutrition Diagnosis:   1. Inadequate oral intake related to perforated appendicitis and dyspagia as evidenced by NPO x 5 days; nutrition support initiated today. Goals:     Meet at least 85% estimated needs via nutrition support x 5-7 days. Monitoring & Evaluation:    - Enteral/parenteral nutrition intake   - Electrolyte and renal profile, Weight/weight change     Previous Nutrition Goals Met:  N/A  Previous Recommendations:      N/A    Education & Discharge Needs:   [x] None Identified   [] Identified and addressed    [x] Participated in care plan, discharge planning, and/or interdisciplinary rounds        Cultural, Christianity and ethnic food preferences identified:   None    Skin Integrity: []Intact  [x] surgical incision  Edema: [x]None []Other  Last BM: 6/12  Food Allergies: [x]None []Other    Anthropometrics:    Weight Loss Metrics 6/18/2019 2/22/2019 4/5/2018 6/27/2017 12/6/2016 11/16/2016 8/3/2016   Today's Wt 260 lb 2.3 oz 237 lb 9.6 oz 229 lb 252 lb 256 lb 247 lb 235 lb 6.4 oz   BMI 36.28 kg/m2 33.14 kg/m2 31.94 kg/m2 35.15 kg/m2 35.7 kg/m2 34.45 kg/m2 32.85 kg/m2      Last 3 Recorded Weights in this Encounter    06/17/19 0655 06/18/19 0700 06/18/19 1417   Weight: 117.5 kg (259 lb 1.6 oz) 118 kg (260 lb 2.3 oz) 118 kg (260 lb 2.3 oz)      Weight Source: Bed  Height: 5' 11\" (180.3 cm),    Body mass index is 36.28 kg/m².      IBW : 78 kg (172 lb), % IBW (Calculated): 151.25 %   ,      Labs:    Lab Results   Component Value Date/Time    Sodium 142 06/18/2019 03:39 AM    Potassium 3.8 06/18/2019 03:39 AM    Chloride 111 (H) 06/18/2019 03:39 AM    CO2 24 06/18/2019 03:39 AM    Glucose 76 06/18/2019 03:39 AM    BUN 18 06/18/2019 03:39 AM    Creatinine 0.98 06/18/2019 03:39 AM    Calcium 7.9 (L) 06/18/2019 03:39 AM    Magnesium 2.2 06/14/2019 01:22 PM    Albumin 2.9 (L) 06/15/2019 04:23 AM     No results found for: HBA1C, HGBE8, DNQ9LNEU, ZLT1ZVRK     Lab Results   Component Value Date/Time    ALT (SGPT) 7 (L) 06/15/2019 04:23 AM    AST (SGOT) 13 (L) 06/15/2019 04:23 AM    Alk.  phosphatase 50 06/15/2019 04:23 AM    Bilirubin, total 0.5 06/15/2019 04:23 AM        Naveen Fried RD CNSC

## 2019-06-18 NOTE — PROGRESS NOTES
Advised by nurse that pt has a 24 hour caregiver (provided by family) that currently is also present in hospital room. Attempted to interview pt/caregiver at bedside but pt currently getting bathed by staff. Will follow. Need to ensure what pt's prior to admission functional status was but nurse advised that pt relied on 24 hour caregiving prior to admission.     Eliana Alejandra, MSW

## 2019-06-18 NOTE — PROGRESS NOTES
0800 Bedside and Verbal shift change report given to Ksenia Freire RN (oncoming nurse) by Sosa Bennett RN (offgoing nurse). Report included the following information SBAR, Kardex, ED Summary, OR Summary, Procedure Summary, Intake/Output, MAR, Recent Results, Cardiac Rhythm NSR/Sinus tach and Alarm Parameters . Assumed care of pt. Assessment complete. 1110 This RN discussing pt with pt's daughter (after daughter provided code). Daughter would like for Dr. Coby Badillo to call either her or her sister and give an update on pt's status. She would also like for ST to reassess pt later today. [phone] This RN paging Dr. Coby Badillo to let him know that pt's daughter would like for him to call her. 65 Dr. Coby Badillo calling this RN to inform that he would like to try the pt without restraints. This RN stating that pt still has NGT, per ST for PO meds, and might pull out without restraints; Dr. Coby Badillo would still like restraints off of pt, per families request. Dr. Coby Badillo stated that family has also requested ST to see pt while family members are there. This RN stated that ST might be able to reassess pt today, but may not be able to wait for family; not sure of ST hours today. 1300 Restraints taken off per Dr. Coby Badillo. This RN advising private sitter to inform staff when she leaves pt alone, so that staff member can make sure pt does not pull out NGT, You catheter or abdominal drain. 1349 This RN assisting private sitter with bathing pt and changing linen. 1400 Tube feeds started. 1510 This RN discussing pt with ST; that pt's family wants him reevaluated today and they would like to be present. ST will reevaluate pt in the morning. Pt is at risk for aspiration and reintubation. 1815 This RN calling down to 4W to give report. RN getting pt just got another admit. 1820 This RN calling down to [facility] for transport bed. [address] , PCT calling down for transport bed.  1850 This RN giving report to Elmo Chairez RN. Elmo Chairez.  This RN will place another IV and draw and send lactic acid. 0875 This RN and [facility] calling down for transport bed.  1900 TRANSFER - OUT REPORT:    Verbal report given to Heber Vega RN(name) on Maggy Willson  being transferred to (unit) for routine post - op       Report consisted of patients Situation, Background, Assessment and   Recommendations(SBAR). Information from the following report(s) SBAR, Kardex, ED Summary, OR Summary, Procedure Summary, Intake/Output, MAR, Recent Results, Cardiac Rhythm NSR/Sinus tach and Alarm Parameters  was reviewed with the receiving nurse. Lines:   Peripheral IV 06/16/19 Left Hand (Active)   Site Assessment Clean, dry, & intact 6/18/2019  4:00 PM   Phlebitis Assessment 0 6/18/2019  4:00 PM   Infiltration Assessment 0 6/18/2019  4:00 PM   Dressing Status Clean, dry, & intact 6/18/2019  4:00 PM   Dressing Type Transparent;Tape 6/18/2019  4:00 PM   Hub Color/Line Status Pink; Infusing 6/18/2019  4:00 PM   Action Taken Open ports on tubing capped 6/18/2019  4:00 PM   Alcohol Cap Used Yes 6/18/2019  4:00 PM       Peripheral IV 06/18/19 Right Antecubital (Active)        Opportunity for questions and clarification was provided.       Patient transported with:   Monitor  O2 @ 2 liters  Registered Nurse  [facility]

## 2019-06-19 LAB
ERYTHROCYTE [DISTWIDTH] IN BLOOD BY AUTOMATED COUNT: 13.9 % (ref 11.5–14.5)
HCT VFR BLD AUTO: 32.9 % (ref 36.6–50.3)
HGB BLD-MCNC: 10.6 G/DL (ref 12.1–17)
MCH RBC QN AUTO: 30.4 PG (ref 26–34)
MCHC RBC AUTO-ENTMCNC: 32.2 G/DL (ref 30–36.5)
MCV RBC AUTO: 94.3 FL (ref 80–99)
NRBC # BLD: 0 K/UL (ref 0–0.01)
NRBC BLD-RTO: 0 PER 100 WBC
PLATELET # BLD AUTO: 112 K/UL (ref 150–400)
PMV BLD AUTO: 12.7 FL (ref 8.9–12.9)
RBC # BLD AUTO: 3.49 M/UL (ref 4.1–5.7)
WBC # BLD AUTO: 9.3 K/UL (ref 4.1–11.1)

## 2019-06-19 PROCEDURE — 94760 N-INVAS EAR/PLS OXIMETRY 1: CPT

## 2019-06-19 PROCEDURE — 85027 COMPLETE CBC AUTOMATED: CPT

## 2019-06-19 PROCEDURE — 74011000250 HC RX REV CODE- 250: Performed by: SURGERY

## 2019-06-19 PROCEDURE — 74011250637 HC RX REV CODE- 250/637: Performed by: SURGERY

## 2019-06-19 PROCEDURE — 74011250636 HC RX REV CODE- 250/636: Performed by: SURGERY

## 2019-06-19 PROCEDURE — 36415 COLL VENOUS BLD VENIPUNCTURE: CPT

## 2019-06-19 PROCEDURE — 65660000000 HC RM CCU STEPDOWN

## 2019-06-19 PROCEDURE — 94640 AIRWAY INHALATION TREATMENT: CPT

## 2019-06-19 PROCEDURE — 92526 ORAL FUNCTION THERAPY: CPT

## 2019-06-19 PROCEDURE — 74011000258 HC RX REV CODE- 258: Performed by: SURGERY

## 2019-06-19 RX ORDER — FACIAL-BODY WIPES
10 EACH TOPICAL ONCE
Status: COMPLETED | OUTPATIENT
Start: 2019-06-19 | End: 2019-06-19

## 2019-06-19 RX ADMIN — DONEPEZIL HYDROCHLORIDE 10 MG: 10 TABLET, FILM COATED ORAL at 09:38

## 2019-06-19 RX ADMIN — VALPROATE SODIUM 125 MG: 100 INJECTION, SOLUTION INTRAVENOUS at 09:48

## 2019-06-19 RX ADMIN — HEPARIN SODIUM 5000 UNITS: 5000 INJECTION INTRAVENOUS; SUBCUTANEOUS at 01:04

## 2019-06-19 RX ADMIN — Medication 10 ML: at 05:39

## 2019-06-19 RX ADMIN — HYDROMORPHONE HYDROCHLORIDE 1 MG: 1 INJECTION, SOLUTION INTRAMUSCULAR; INTRAVENOUS; SUBCUTANEOUS at 10:43

## 2019-06-19 RX ADMIN — MEMANTINE HYDROCHLORIDE 10 MG: 10 TABLET ORAL at 09:47

## 2019-06-19 RX ADMIN — VALPROATE SODIUM 125 MG: 100 INJECTION, SOLUTION INTRAVENOUS at 16:18

## 2019-06-19 RX ADMIN — SERTRALINE HYDROCHLORIDE 150 MG: 50 TABLET ORAL at 09:38

## 2019-06-19 RX ADMIN — VALPROATE SODIUM 125 MG: 100 INJECTION, SOLUTION INTRAVENOUS at 05:39

## 2019-06-19 RX ADMIN — BISACODYL 10 MG: 10 SUPPOSITORY RECTAL at 19:01

## 2019-06-19 RX ADMIN — HEPARIN SODIUM 5000 UNITS: 5000 INJECTION INTRAVENOUS; SUBCUTANEOUS at 16:18

## 2019-06-19 RX ADMIN — LORAZEPAM 2 MG: 2 INJECTION INTRAMUSCULAR; INTRAVENOUS at 22:04

## 2019-06-19 RX ADMIN — PIPERACILLIN AND TAZOBACTAM 3.38 G: 3; .375 INJECTION, POWDER, FOR SOLUTION INTRAVENOUS at 05:39

## 2019-06-19 RX ADMIN — CARBIDOPA AND LEVODOPA 0.5 TABLET: 25; 100 TABLET ORAL at 09:39

## 2019-06-19 RX ADMIN — ASCORBIC ACID: 500 INJECTION, SOLUTION INTRAMUSCULAR; INTRAVENOUS; SUBCUTANEOUS at 18:53

## 2019-06-19 RX ADMIN — FAMOTIDINE 20 MG: 10 INJECTION, SOLUTION INTRAVENOUS at 09:38

## 2019-06-19 RX ADMIN — ZONISAMIDE 100 MG: 100 CAPSULE ORAL at 09:47

## 2019-06-19 RX ADMIN — HEPARIN SODIUM 5000 UNITS: 5000 INJECTION INTRAVENOUS; SUBCUTANEOUS at 09:38

## 2019-06-19 RX ADMIN — IPRATROPIUM BROMIDE AND ALBUTEROL SULFATE 3 ML: .5; 3 SOLUTION RESPIRATORY (INHALATION) at 00:18

## 2019-06-19 RX ADMIN — HYDROMORPHONE HYDROCHLORIDE 1 MG: 1 INJECTION, SOLUTION INTRAMUSCULAR; INTRAVENOUS; SUBCUTANEOUS at 16:58

## 2019-06-19 RX ADMIN — PIPERACILLIN AND TAZOBACTAM 3.38 G: 3; .375 INJECTION, POWDER, FOR SOLUTION INTRAVENOUS at 13:49

## 2019-06-19 RX ADMIN — PIPERACILLIN AND TAZOBACTAM 3.38 G: 3; .375 INJECTION, POWDER, FOR SOLUTION INTRAVENOUS at 21:55

## 2019-06-19 RX ADMIN — CARBIDOPA AND LEVODOPA 0.5 TABLET: 25; 100 TABLET ORAL at 22:22

## 2019-06-19 RX ADMIN — HYDROMORPHONE HYDROCHLORIDE 1 MG: 1 INJECTION, SOLUTION INTRAMUSCULAR; INTRAVENOUS; SUBCUTANEOUS at 04:16

## 2019-06-19 RX ADMIN — IPRATROPIUM BROMIDE AND ALBUTEROL SULFATE 3 ML: .5; 3 SOLUTION RESPIRATORY (INHALATION) at 04:27

## 2019-06-19 RX ADMIN — Medication 10 ML: at 13:49

## 2019-06-19 RX ADMIN — VALPROATE SODIUM: 100 INJECTION, SOLUTION INTRAVENOUS at 21:57

## 2019-06-19 RX ADMIN — HYDROMORPHONE HYDROCHLORIDE 1 MG: 1 INJECTION, SOLUTION INTRAMUSCULAR; INTRAVENOUS; SUBCUTANEOUS at 22:00

## 2019-06-19 RX ADMIN — CARBIDOPA AND LEVODOPA 0.5 TABLET: 25; 100 TABLET ORAL at 16:58

## 2019-06-19 RX ADMIN — IPRATROPIUM BROMIDE AND ALBUTEROL SULFATE 3 ML: .5; 3 SOLUTION RESPIRATORY (INHALATION) at 20:59

## 2019-06-19 RX ADMIN — FAMOTIDINE 20 MG: 10 INJECTION, SOLUTION INTRAVENOUS at 22:01

## 2019-06-19 RX ADMIN — MEMANTINE HYDROCHLORIDE 10 MG: 10 TABLET ORAL at 17:00

## 2019-06-19 NOTE — PROGRESS NOTES
Problem: Dysphagia (Adult)  Goal: *Acute Goals and Plan of Care (Insert Text)  Description  Speech therapy goals  initiated 6/18/2019   1. Patient will participate in re-assessment of swallow function within 7 days    Outcome: Progressing Towards Goal     SPEECH LANGUAGE PATHOLOGY DYSPHAGIA TREATMENT  Patient: Severa Scotland (77 y.o. male)  Date: 6/19/2019  Diagnosis: Appendicitis with perforation [K35.32]  Appendicitis with perforation [K35.32]  Appendicitis with perforation [K35.32] <principal problem not specified>  Procedure(s) (LRB):  CYSTOSCOPY  INSERTION OF RIGHT URETERAL STENT, LAPAROSCOPY GENERAL DIAGNOSTIC, ,APPENDECTOMY, (N/A)  Cystoscopy Right Ureteral Stent  Insertion (N/A) 4 Days Post-Op  Precautions: swallow      ASSESSMENT:  Patient confused, restless, and easily agitated. Patient required multiple repetitions by family and caregiver to finally state his name. Poor command following noted. Patient repositioned immediately prior to re-evaluation of swallowing, however patient quickly scooted down in bed and slumped over to the left, resisting additional repositioning. Patient with moderate oropharyngeal dysphagia largely related to mental status and agitation. Patient with impaired bolus acceptance requiring verbal and tactile cues both to open mouth and close mouth around spoon. Patient with slow oral prep, delayed posterior propulsion, and suspected premature spillage with patient frequently yelling and talking with bolus in oral cavity. Pharyngeal dysphagia is characterized by delayed swallow initiation. Patient with coughing with thin liquids and x1 with ice chip when he talked/yelled with bolus in oral cavity. Patient is at high risk for aspiration secondary to mental status. Provided education to daughter Tea Salazar, grandson, and caregiver regarding dysphagia, aspiration risk due to agitation/confusion, and recommendation for NPO except ice chips.  Provided education that patient's swallowing has improved from yesterday when he would not accept any PO, however he needs more time given mental status. Daughter immediately seemed upset, stating that we needed someone with Alzheimer's experience, that patient is uncomfortable and in pain, and that hospital staff would have patient in restraints even at home if we could. Daughter then called someone on the phone and was resistant to additional education. Notified RN. Also notified RN of family request for pain medication. Progression toward goals:  ?         Improving appropriately and progressing toward goals  ? Improving slowly and progressing toward goals  ? Not making progress toward goals and plan of care will be adjusted     PLAN:  Recommendations and Planned Interventions:  --NPO except ice chips with RN when patient awake, alert, calm, actively accepting  --SLP to follow up tomorrow to re-evaluate swallowing  Patient continues to benefit from skilled intervention to address the above impairments. Continue treatment per established plan of care. Discharge Recommendations: To Be Determined     SUBJECTIVE:   Patient yelled frequently. OBJECTIVE:   Cognitive and Communication Status:  Neurologic State: Alert, Restless, Agitated  Orientation Level: Oriented to person, Other (Comment)(with max repetition)  Cognition: Decreased command following, Decreased attention/concentration  Perception: Cues to maintain midline in sitting  Perseveration: No perseveration noted  Safety/Judgement: Not assessed  Dysphagia Treatment:  Oral Assessment:  Oral Assessment  Oral Hygiene: dry oral mucosa  P.O. Trials:  Patient Position: semi-upright in bed, lying to left despite repositioning immediately prior to evaluation  Vocal quality prior to P.O.: No impairment  Consistency Presented: Ice chips; Thin liquid  How Presented: SLP-fed/presented;Spoon;Straw     Bolus Acceptance: Impaired(tactile cues)  Bolus Formation/Control: Impaired  Type of Impairment: Delayed;Premature spillage; Other (comment)(talking/yelling with bolus in oral cavity)  Propulsion: Delayed (# of seconds)  Oral Residue: None  Initiation of Swallow: Delayed (# of seconds)  Laryngeal Elevation: Decreased  Aspiration Signs/Symptoms: Weak cough; Other (comment)(with thin liquid)  Pharyngeal Phase Characteristics: Multiple swallows                After treatment:   ?              Patient left in no apparent distress sitting up in chair  ? Patient left in no apparent distress in bed  ? Call bell left within reach  ? Nursing notified  ? Caregiver present  ? Bed alarm activated    COMMUNICATION/EDUCATION:   Patient was educated regarding His deficit(s) of dysphagia as this relates to His diagnosis of dementia, agitation. He demonstrated Guarded understanding as evidenced by agitation. Family/caregiver resistant to education. The patients plan of care including recommendations, planned interventions, and recommended diet changes were discussed with: Registered Nurse.     Pierre Madera, SLP  Time Calculation: 20 mins

## 2019-06-19 NOTE — PROGRESS NOTES
[facility]    POD #3    Subjective     Tube feeding at 10cc/hr, NPO, howard catheter, pain seems controlled, family says he has passed flatus    Objective     Patient Vitals for the past 24 hrs:   Temp Pulse Resp BP SpO2   06/19/19 0739 98.5 °F (36.9 °C) (!) 103 24 161/85 93 %   06/19/19 0427     94 %   06/19/19 0356 99.3 °F (37.4 °C) 88 24 158/84 97 %   06/19/19 0018     93 %   06/18/19 2334 98.9 °F (37.2 °C) 93 20 160/84 93 %   06/18/19 2125 98.8 °F (37.1 °C)       06/18/19 1938 (!) 101 °F (38.3 °C) 95 23 141/81 100 %   06/18/19 1800  99 23 138/81 100 %   06/18/19 1700  98 17 140/83 100 %   06/18/19 1600 99.4 °F (37.4 °C) 97 28 127/82 100 %   06/18/19 1531     100 %   06/18/19 1500  93 24 124/75 100 %   06/18/19 1400  98 19 137/80 98 %   06/18/19 1300  97 18 158/86 99 %   06/18/19 1200 100 °F (37.8 °C) 92 20 150/87 100 %   06/18/19 1143     97 %   06/18/19 1100  95 22 144/81 99 %   06/18/19 1000  93 23 132/79 99 %   06/18/19 0900  (!) 105 18 142/82 95 %   06/18/19 0800 97.2 °F (36.2 °C) 96 22 148/77 100 %       Date 06/18/19 0700 - 06/19/19 0659 06/19/19 0700 - 06/20/19 0659   Shift 7811-7931 8016-0511 24 Hour Total 3547-3657 7571-7736 24 Hour Total   INTAKE   I.V.(mL/kg/hr) 1925(1.4)  1925(0.7)        Volume (0.45% sodium chloride with KCl 20 mEq/L infusion) 1625  1625        Volume (piperacillin-tazobactam (ZOSYN) 3.375 g in 0.9% sodium chloride (MBP/ADV) 100 mL) 200  200        Volume (valproate (DEPACON) 125 mg in 0.9% sodium chloride 50 mL IVPB) 100  100      NG/ 30 300        Water Flush Volume (mL) (Nasogastric Tube 06/15/19) 30 30 60        Medication Volume (Nasogastric Tube 06/15/19) 210  210        Intake (ml) (Nasogastric Tube 06/15/19) 30  30      Shift Total(mL/kg) 6872(53.5) 30(0.3) 2225(18.7)      OUTPUT   Urine(mL/kg/hr) 1350(1) 600(0.4) 1950(0.7)        Urine Output (mL) (Urinary Catheter 06/14/19) 7244 760 2458      Drains 45 15 60        Output (ml) (Willard Sullivan #1 06/16/19 Left; Lower Abdomen) 45 15 60      Shift Total(mL/kg) 1395(11.8) 615(5.2) 2010(16.9)       -585 215      Weight (kg) 118 118.8 118.8 118.8 118.8 118.8       PE  Pulm - CTAB  CV - RRR  Abd - soft, distended, BS present, incisions c/d/i, ARIANA serous    Labs  Recent Results (from the past 12 hour(s))   CBC W/O DIFF    Collection Time: 06/19/19  4:06 AM   Result Value Ref Range    WBC 9.3 4.1 - 11.1 K/uL    RBC 3.49 (L) 4.10 - 5.70 M/uL    HGB 10.6 (L) 12.1 - 17.0 g/dL    HCT 32.9 (L) 36.6 - 50.3 %    MCV 94.3 80.0 - 99.0 FL    MCH 30.4 26.0 - 34.0 PG    MCHC 32.2 30.0 - 36.5 g/dL    RDW 13.9 11.5 - 14.5 %    PLATELET 722 (L) 247 - 400 K/uL    MPV 12.7 8.9 - 12.9 FL    NRBC 0.0 0  WBC    ABSOLUTE NRBC 0.00 0.00 - 0.01 K/uL         Assessment     Eleonora Smith is a 72 y. o.yr old male s/p laparoscopic appendectomy for perforation and R ureteral stent for R nephrolithiasis    Plan     Keeping NPO until cleared by speech to swallow, we will get them to come by again today to reassess  Continue tube feeding at low rate due to the distention  PPN rate increased today  Continue restraint on the R side  dex Hooks with urology  Continue IV abx, WBC normal, occasional fever    Jorge Luis Blackmon MD

## 2019-06-19 NOTE — PROGRESS NOTES
1200: Holding pt's flomax bc unable to administer via NGT and pt remains NPO per SLP. D/w Dr. Jett Hendricks and is ok to still remove howard catheter. Howard removed and pt tolerated fairly. Updated family on POC. 1330: Pt incontinent of urine. PCT bladder scanned pt to assess post void residuals, only 21 cc noted. Will continue to monitor.

## 2019-06-19 NOTE — PROGRESS NOTES
Problem: Falls - Risk of  Goal: *Absence of Falls  Description  Document Esaw Grundy Fall Risk and appropriate interventions in the flowsheet. 6/19/2019 1526 by Manoj Earing E  Outcome: Progressing Towards Goal  6/19/2019 1526 by Manoj Earing E  Outcome: Progressing Towards Goal     Problem: Pressure Injury - Risk of  Goal: *Prevention of pressure injury  Description  Document Stas Scale and appropriate interventions in the flowsheet.   6/19/2019 1526 by Manoj Earing E  Outcome: Progressing Towards Goal  6/19/2019 1526 by Manoj Earing E  Outcome: Progressing Towards Goal     Problem: Surgical Pathway Post-Op Day 2 through Discharge  Goal: Activity/Safety  6/19/2019 1526 by Manoj Earing E  Outcome: Progressing Towards Goal  6/19/2019 1526 by Manoj Earing E  Outcome: Progressing Towards Goal     Problem: Surgical Pathway Post-Op Day 2 through Discharge  Goal: Nutrition/Diet  6/19/2019 1526 by Manoj Earing E  Outcome: Progressing Towards Goal  6/19/2019 1526 by Manoj Earing E  Outcome: Progressing Towards Goal     Problem: Surgical Pathway Post-Op Day 2 through Discharge  Goal: Medications  6/19/2019 1526 by Manoj Earing E  Outcome: Progressing Towards Goal  6/19/2019 1526 by Manoj Earing E  Outcome: Progressing Towards Goal     Problem: Surgical Pathway Post-Op Day 2 through Discharge  Goal: Respiratory  6/19/2019 1526 by Manoj Earing E  Outcome: Progressing Towards Goal  6/19/2019 1526 by Manoj Earing E  Outcome: Progressing Towards Goal     Problem: Surgical Pathway Post-Op Day 2 through Discharge  Goal: Treatments/Interventions/Procedures  6/19/2019 1526 by Manoj Earing  Outcome: Progressing Towards Goal  6/19/2019 1526 by Manoj Earing  Outcome: Progressing Towards Goal     Problem: Surgical Pathway Post-Op Day 2 through Discharge  Goal: *No signs and symptoms of infection or wound complications  2/69/0496 1526 by Ovidio HENRY  Outcome: Progressing Towards Goal  6/19/2019 1526 by Ovidio HENRY  Outcome: Progressing Towards Goal     Problem: Surgical Pathway Post-Op Day 2 through Discharge  Goal: *Optimal pain control at patient's stated goal  6/19/2019 1526 by Ovidio HENRY  Outcome: Progressing Towards Goal  6/19/2019 1526 by Ovidio HENRY  Outcome: Progressing Towards Goal     Problem: Surgical Pathway Post-Op Day 2 through Discharge  Goal: *Hemodynamically stable  6/19/2019 1526 by Ovidio HENRY  Outcome: Progressing Towards Goal  6/19/2019 1526 by Ovidio Bazan  Outcome: Progressing Towards Goal

## 2019-06-19 NOTE — PROGRESS NOTES
Bedside shift change report given to LEANA SANDRA (oncoming nurse) by Narciso Elizalde (offgoing nurse). Report included the following information SBAR, Kardex, MAR and Recent Results.

## 2019-06-19 NOTE — ANESTHESIA POSTPROCEDURE EVALUATION
Procedure(s):  CYSTOSCOPY  INSERTION OF RIGHT URETERAL STENT, LAPAROSCOPY GENERAL DIAGNOSTIC, ,APPENDECTOMY,  Cystoscopy Right Ureteral Stent  Insertion. general    Anesthesia Post Evaluation        Patient location during evaluation: PACU  Note status: Adequate. Level of consciousness: responsive to verbal stimuli and sleepy but conscious  Pain management: satisfactory to patient  Airway patency: patent  Anesthetic complications: no  Cardiovascular status: acceptable  Respiratory status: acceptable  Hydration status: acceptable  Comments: +Post-Anesthesia Evaluation and Assessment    Patient: Krunal Alexander MRN: [id number]  SSN: [id number]   YOB: 1945  Age: 72 y.o. Sex: male          Cardiovascular Function/Vital Signs    /88 (BP 1 Location: Left arm, BP Patient Position: At rest;Supine)   Pulse 89   Temp 37.4 °C (99.3 °F)   Resp 20   Ht 5' 11\" (1.803 m)   Wt 118.8 kg (261 lb 14.5 oz)   SpO2 100%   BMI 36.53 kg/m²     Patient is status post Procedure(s):  CYSTOSCOPY  INSERTION OF RIGHT URETERAL STENT, LAPAROSCOPY GENERAL DIAGNOSTIC, ,APPENDECTOMY,  Cystoscopy Right Ureteral Stent  Insertion. Nausea/Vomiting: Controlled. Postoperative hydration reviewed and adequate. Pain:  Pain Scale 1: Adult Nonverbal Pain Scale (06/18/19 2125)   Managed. Neurological Status:   Neuro (WDL): Exceptions to WDL (06/16/19 0245)   At baseline. Mental Status and Level of Consciousness: Arousable. Pulmonary Status:   O2 Device: Room air (06/19/19 1303)   Adequate oxygenation and airway patent. Complications related to anesthesia: None    Post-anesthesia assessment completed. No concerns. I have evaluated the patient and the patient is stable and ready to be discharged from PACU .     Signed By: Fanny Golden MD    6/19/2019        Vitals Value Taken Time   BP 96/56 6/16/2019  3:15 AM   Temp 36.6 °C (97.8 °F) 6/16/2019  2:45 AM   Pulse 104 6/16/2019  3:15 AM   Resp 20 6/16/2019 3:15 AM   SpO2 96 % 6/16/2019  3:15 AM

## 2019-06-19 NOTE — PROGRESS NOTES
CM follow up. CM met with patient who continues with dementia. Patient continues medically unstable. Patient is NPO with Speech following. CM spoke with caregiver Sulma Rojas at the bedside (one of patient's 24 hour caregivers). Per Sulma Rojas. patient's wife also receives 24 hour care, and she is not able to be a decision maker. Family contacts are daughters Nia Narayanan and Aidee Edwards who share decision making responsibilities. Patient will probably need BLS stretcher transport at discharge.

## 2019-06-20 LAB
ANION GAP SERPL CALC-SCNC: 6 MMOL/L (ref 5–15)
BACTERIA SPEC CULT: ABNORMAL
BUN SERPL-MCNC: 14 MG/DL (ref 6–20)
BUN/CREAT SERPL: 15 (ref 12–20)
CALCIUM SERPL-MCNC: 8.4 MG/DL (ref 8.5–10.1)
CHLORIDE SERPL-SCNC: 113 MMOL/L (ref 97–108)
CO2 SERPL-SCNC: 26 MMOL/L (ref 21–32)
CREAT SERPL-MCNC: 0.96 MG/DL (ref 0.7–1.3)
ERYTHROCYTE [DISTWIDTH] IN BLOOD BY AUTOMATED COUNT: 14.2 % (ref 11.5–14.5)
GLUCOSE SERPL-MCNC: 137 MG/DL (ref 65–100)
HCT VFR BLD AUTO: 31.4 % (ref 36.6–50.3)
HGB BLD-MCNC: 10 G/DL (ref 12.1–17)
MCH RBC QN AUTO: 30.2 PG (ref 26–34)
MCHC RBC AUTO-ENTMCNC: 31.8 G/DL (ref 30–36.5)
MCV RBC AUTO: 94.9 FL (ref 80–99)
NRBC # BLD: 0 K/UL (ref 0–0.01)
NRBC BLD-RTO: 0 PER 100 WBC
PLATELET # BLD AUTO: 240 K/UL (ref 150–400)
PMV BLD AUTO: 12 FL (ref 8.9–12.9)
POTASSIUM SERPL-SCNC: 3.6 MMOL/L (ref 3.5–5.1)
RBC # BLD AUTO: 3.31 M/UL (ref 4.1–5.7)
SERVICE CMNT-IMP: ABNORMAL
SODIUM SERPL-SCNC: 145 MMOL/L (ref 136–145)
WBC # BLD AUTO: 8.2 K/UL (ref 4.1–11.1)

## 2019-06-20 PROCEDURE — 74011250637 HC RX REV CODE- 250/637: Performed by: SURGERY

## 2019-06-20 PROCEDURE — 92526 ORAL FUNCTION THERAPY: CPT | Performed by: SPEECH-LANGUAGE PATHOLOGIST

## 2019-06-20 PROCEDURE — 80048 BASIC METABOLIC PNL TOTAL CA: CPT

## 2019-06-20 PROCEDURE — 74011000258 HC RX REV CODE- 258: Performed by: SURGERY

## 2019-06-20 PROCEDURE — 74011250636 HC RX REV CODE- 250/636: Performed by: SURGERY

## 2019-06-20 PROCEDURE — 65660000000 HC RM CCU STEPDOWN

## 2019-06-20 PROCEDURE — 36415 COLL VENOUS BLD VENIPUNCTURE: CPT

## 2019-06-20 PROCEDURE — 85027 COMPLETE CBC AUTOMATED: CPT

## 2019-06-20 PROCEDURE — 94640 AIRWAY INHALATION TREATMENT: CPT

## 2019-06-20 PROCEDURE — 74011000250 HC RX REV CODE- 250: Performed by: SURGERY

## 2019-06-20 RX ORDER — OXYCODONE AND ACETAMINOPHEN 5; 325 MG/1; MG/1
1 TABLET ORAL
Qty: 20 TAB | Refills: 0 | Status: SHIPPED | OUTPATIENT
Start: 2019-06-20 | End: 2019-06-23

## 2019-06-20 RX ORDER — AMOXICILLIN AND CLAVULANATE POTASSIUM 875; 125 MG/1; MG/1
1 TABLET, FILM COATED ORAL EVERY 12 HOURS
Qty: 20 TAB | Refills: 0 | Status: SHIPPED | OUTPATIENT
Start: 2019-06-20 | End: 2019-06-30

## 2019-06-20 RX ORDER — IPRATROPIUM BROMIDE AND ALBUTEROL SULFATE 2.5; .5 MG/3ML; MG/3ML
3 SOLUTION RESPIRATORY (INHALATION)
Status: DISCONTINUED | OUTPATIENT
Start: 2019-06-20 | End: 2019-06-22 | Stop reason: HOSPADM

## 2019-06-20 RX ADMIN — Medication 10 ML: at 22:00

## 2019-06-20 RX ADMIN — PIPERACILLIN AND TAZOBACTAM 3.38 G: 3; .375 INJECTION, POWDER, FOR SOLUTION INTRAVENOUS at 21:59

## 2019-06-20 RX ADMIN — DONEPEZIL HYDROCHLORIDE 10 MG: 10 TABLET, FILM COATED ORAL at 08:44

## 2019-06-20 RX ADMIN — VALPROATE SODIUM 125 MG: 100 INJECTION, SOLUTION INTRAVENOUS at 17:01

## 2019-06-20 RX ADMIN — CARBIDOPA AND LEVODOPA 0.5 TABLET: 25; 100 TABLET ORAL at 21:58

## 2019-06-20 RX ADMIN — POTASSIUM CHLORIDE AND SODIUM CHLORIDE: 450; 150 INJECTION, SOLUTION INTRAVENOUS at 10:58

## 2019-06-20 RX ADMIN — MEMANTINE HYDROCHLORIDE 10 MG: 10 TABLET ORAL at 17:01

## 2019-06-20 RX ADMIN — HYDROMORPHONE HYDROCHLORIDE 1 MG: 1 INJECTION, SOLUTION INTRAMUSCULAR; INTRAVENOUS; SUBCUTANEOUS at 21:58

## 2019-06-20 RX ADMIN — ZONISAMIDE 100 MG: 100 CAPSULE ORAL at 09:01

## 2019-06-20 RX ADMIN — VALPROATE SODIUM 125 MG: 100 INJECTION, SOLUTION INTRAVENOUS at 10:53

## 2019-06-20 RX ADMIN — HEPARIN SODIUM 5000 UNITS: 5000 INJECTION INTRAVENOUS; SUBCUTANEOUS at 08:44

## 2019-06-20 RX ADMIN — CARBIDOPA AND LEVODOPA 0.5 TABLET: 25; 100 TABLET ORAL at 17:02

## 2019-06-20 RX ADMIN — CARBIDOPA AND LEVODOPA 0.5 TABLET: 25; 100 TABLET ORAL at 08:44

## 2019-06-20 RX ADMIN — FAMOTIDINE 20 MG: 10 INJECTION, SOLUTION INTRAVENOUS at 08:44

## 2019-06-20 RX ADMIN — PIPERACILLIN AND TAZOBACTAM 3.38 G: 3; .375 INJECTION, POWDER, FOR SOLUTION INTRAVENOUS at 13:38

## 2019-06-20 RX ADMIN — PIPERACILLIN AND TAZOBACTAM 3.38 G: 3; .375 INJECTION, POWDER, FOR SOLUTION INTRAVENOUS at 06:59

## 2019-06-20 RX ADMIN — VALPROATE SODIUM 125 MG: 100 INJECTION, SOLUTION INTRAVENOUS at 04:58

## 2019-06-20 RX ADMIN — HYDROMORPHONE HYDROCHLORIDE 1 MG: 1 INJECTION, SOLUTION INTRAMUSCULAR; INTRAVENOUS; SUBCUTANEOUS at 12:36

## 2019-06-20 RX ADMIN — Medication 10 ML: at 13:36

## 2019-06-20 RX ADMIN — MEMANTINE HYDROCHLORIDE 10 MG: 10 TABLET ORAL at 08:45

## 2019-06-20 RX ADMIN — IPRATROPIUM BROMIDE AND ALBUTEROL SULFATE 3 ML: .5; 3 SOLUTION RESPIRATORY (INHALATION) at 07:28

## 2019-06-20 RX ADMIN — FAMOTIDINE 20 MG: 10 INJECTION, SOLUTION INTRAVENOUS at 21:59

## 2019-06-20 RX ADMIN — VALPROATE SODIUM 125 MG: 100 INJECTION, SOLUTION INTRAVENOUS at 21:59

## 2019-06-20 RX ADMIN — HEPARIN SODIUM 5000 UNITS: 5000 INJECTION INTRAVENOUS; SUBCUTANEOUS at 04:58

## 2019-06-20 RX ADMIN — SERTRALINE HYDROCHLORIDE 150 MG: 50 TABLET ORAL at 08:44

## 2019-06-20 RX ADMIN — ASCORBIC ACID: 500 INJECTION, SOLUTION INTRAMUSCULAR; INTRAVENOUS; SUBCUTANEOUS at 19:17

## 2019-06-20 RX ADMIN — HEPARIN SODIUM 5000 UNITS: 5000 INJECTION INTRAVENOUS; SUBCUTANEOUS at 17:00

## 2019-06-20 NOTE — PROGRESS NOTES
Pt extremely agitated, trashing head and extremities in bed. Pt yelling out and uncooperative with nursing care. Pt has private duty care-giver at bedside who loosened left wrist restraint. Pt attempted to pull out NG tube. Pt medicated with prn meds for pain and agitation. Private duty Care-giver advised not to touch restraints; as restraints are to be managed per hospital staff.

## 2019-06-20 NOTE — PROGRESS NOTES
Bedside shift change report given to Dg Robles (oncoming nurse) by Juliet Palacios (offgoing nurse).  Report included the following information SBAR, Recent Results and Cardiac Rhythm ST.

## 2019-06-20 NOTE — PROGRESS NOTES
Problem: Dysphagia (Adult)  Goal: *Acute Goals and Plan of Care (Insert Text)  Description  Speech therapy goals  initiated 6/18/2019   1. Patient will participate in re-assessment of swallow function within 7 days    Outcome: Progressing Towards Goal     SPEECH LANGUAGE PATHOLOGY DYSPHAGIA TREATMENT  Patient: Mason Coyle (37 y.o. male)  Date: 6/20/2019  Diagnosis: Appendicitis with perforation [K35.32]  Appendicitis with perforation [K35.32]  Appendicitis with perforation [K35.32] <principal problem not specified>  Procedure(s) (LRB):  CYSTOSCOPY  INSERTION OF RIGHT URETERAL STENT, LAPAROSCOPY GENERAL DIAGNOSTIC, ,APPENDECTOMY, (N/A)  Cystoscopy Right Ureteral Stent  Insertion (N/A) 5 Days Post-Op  Precautions: Aspiration      ASSESSMENT:  Patient presents with severe oropharyngeal dysphagia characterized by impaired/absent bolus acceptance/retrieval, delayed oral manipulation, premature spillage into pharynx and delayed/absent pharyngeal swallow. Patient with inconsistent/poor ability to sip via straw (sometimes multiple swallows and absent at other times, swishing and spitting out liquids at other times.)  Caregiver and daughter on phone consistently verbally cueing patient to sip, chew and swallow. Patient with throat clearing/weak cough and belching after all pharyngeal swallows. Patient at high risk for aspiration given dysphagia, impaired mentation and poor ability to follow commands. Caregiver reports patient required constant verbal cues at baseline to swallow. Caregiver also reports throat clearing and belching are baseline when educating regarding risk of aspiration. Regardless of baseline or new dysphagia risk of aspiration is high and NPO is safest course. Caregiver/daughter seem unwilling to accept education regarding risk of aspiration. Consider goals of care discussion. Perhaps palliative care may be beneficial.?  Or diet may be offered with known risk of aspiration?   Progression toward goals:  ?         Improving appropriately and progressing toward goals  ? Improving slowly and progressing toward goals  ? Not making progress toward goals and plan of care will be adjusted     PLAN:  Recommendations and Planned Interventions:  NPO is safest course  Oral care  MD discussion regarding goals of care  Patient continues to benefit from skilled intervention to address the above impairments. Continue treatment per established plan of care. Discharge Recommendations: To Be Determined     SUBJECTIVE:   Patient stated ? Yeah? and laughed when his name called. Wife and caregiver at bedside. Daughter on speaker phone. OBJECTIVE:   Cognitive and Communication Status:  Neurologic State: Eyes do not open to any stimulus  Orientation Level: Disoriented to person  Cognition: Decreased attention/concentration, Decreased command following  Perception: Cues to maintain midline in sitting  Perseveration: No perseveration noted  Safety/Judgement: Not assessed  Dysphagia Treatment:  Oral Assessment:  Oral Assessment  Labial: (Did not follow commands to assess)  Dentition: Natural;Intact  Oral Hygiene: Dry oral mucosa. Oral care completed. P.O. Trials:  Patient Position: Upright in bed, leaning to left  Vocal quality prior to P.O.: No impairment  Consistency Presented: Ice chips; Thin liquid; Nectar thick liquid;Puree  How Presented: SLP-fed/presented;Spoon;Straw     Bolus Acceptance: Impaired  Bolus Formation/Control: Impaired  Type of Impairment: Delayed;Premature spillage(Talking while bolus in oral cavity)  Propulsion: Delayed (# of seconds)  Oral Residue: None  Initiation of Swallow: Delayed (# of seconds)(Absent at times)  Laryngeal Elevation: Decreased  Aspiration Signs/Symptoms: Clear throat;Weak cough(Belching)  Pharyngeal Phase Characteristics: Double swallow;Multiple swallows(Grimacing while swallowing)             Oral Phase Severity: Severe  Pharyngeal Phase Severity : Severe  After treatment:   ?              Patient left in no apparent distress sitting up in chair  ? Patient left in no apparent distress in bed  ? Call bell left within reach  ? Nursing notified  ? Caregiver present  ? Bed alarm activated    COMMUNICATION/EDUCATION:   Patient was educated regarding role of SLP, risk of aspiration. Patient did not respond. Caregiver stated \"So what are you going to do about this tube? \"    The patient?s plan of care including recommendations, planned interventions, and recommended diet changes were discussed with: Registered Nurse. ? Posted safety precautions in patient's room.     DAWSON Ramirez  Time Calculation: 30 mins

## 2019-06-20 NOTE — PROGRESS NOTES
Pt family upset regarding ativan administration. Daughter stated \"The sedation is causing him not to pass his swallow test.\" Note written on white board in the room for other staff to see. Will also pass on to day shift care team family preference and follow up with Pt advocate.

## 2019-06-20 NOTE — PROGRESS NOTES
ADULT PROTOCOL: JET AEROSOL  REASSESSMENT    Patient  Liz Singh     72 y.o.   male     6/20/2019  8:23 AM    Breath Sounds Pre Procedure: Right Breath Sounds: Diminished                               Left Breath Sounds: Diminished    Breath Sounds Post Procedure: Right Breath Sounds: Diminished                                 Left Breath Sounds: Diminished    Breathing pattern: Pre procedure Breathing Pattern: Regular          Post procedure Breathing Pattern: Regular    Heart Rate: Pre procedure Pulse: 104           Post procedure Pulse: 99    Resp Rate: Pre procedure Respirations: 22           Post procedure Respirations: 22      Cough: Pre procedure Cough: Non-productive               Post procedure Cough: Non-productive    Suctioned: NO    Sputum: Pre procedure                   Post procedure      Oxygen: O2 Device: Room air        Changed: NO    SpO2: Pre procedure SpO2: 95 %   without oxygen              Post procedure SpO2: 97 %  without oxygen    Nebulizer Therapy: Current medications Aerosolized Medications: DuoNeb      Changed: YES    Smoking History: never    Problem List:   Patient Active Problem List   Diagnosis Code    Depression F32.9    Essential hypertension I10    Dementia of the Alzheimer's type with late onset without behavioral disturbance G30.1, F02.80    Parkinson's disease (tremor, stiffness, slow motion, unstable posture) ([facility] [address] G20    Appendicitis with perforation K35.32       Respiratory Therapist: Key Keenan

## 2019-06-20 NOTE — PROGRESS NOTES
[facility] General Surgery    POD #4    Subjective     Doing well, tolerating tube feeding, had a BM, some agitation overnight    Objective     Patient Vitals for the past 24 hrs:   Temp Pulse Resp BP SpO2   06/20/19 0351 99.9 °F (37.7 °C) 89 20 127/73 94 %   06/19/19 2322 99.1 °F (37.3 °C) (!) 104 20 151/77 95 %   06/19/19 2200  (!) 115   100 %   06/19/19 2059     100 %   06/19/19 1921 99.4 °F (37.4 °C) 98 20 165/83 100 %   06/19/19 1524 99 °F (37.2 °C) 91 24 161/86 95 %   06/19/19 1303     100 %   06/19/19 1143 99.3 °F (37.4 °C) 89 20 150/88 94 %   06/19/19 0836     93 %   06/19/19 0739 98.5 °F (36.9 °C) (!) 103 24 161/85 93 %       Date 06/19/19 0700 - 06/20/19 0659 06/20/19 0700 - 06/21/19 0659   Shift 8056-7519 6054-5542 24 Hour Total 5515-9035 0858-7365 24 Hour Total   INTAKE   Shift Total(mL/kg)         OUTPUT   Urine(mL/kg/hr) 600(0.4)  600(0.2)        Urine Occurrence(s) 3 x 1 x 4 x        Urine Output (mL) ([REMOVED] Urinary Catheter 06/14/19) 600  600      Drains 30  30        Output (ml) Kathy Bazan #1 06/16/19 Left; Lower Abdomen) 30  30      Stool           Stool Occurrence(s)  1 x 1 x      Shift Total(mL/kg) 630(5.3)  630(5.4)      NET -630  -630      Weight (kg) 118.8 117 117 117 117 117       PE  Pulm - CTAB  CV - RRR  Abd - soft, less distended, BS present, incisions c/d/i, ARIANA serous    Labs  Recent Results (from the past 12 hour(s))   METABOLIC PANEL, BASIC    Collection Time: 06/20/19  4:56 AM   Result Value Ref Range    Sodium 145 136 - 145 mmol/L    Potassium 3.6 3.5 - 5.1 mmol/L    Chloride 113 (H) 97 - 108 mmol/L    CO2 26 21 - 32 mmol/L    Anion gap 6 5 - 15 mmol/L    Glucose 137 (H) 65 - 100 mg/dL    BUN 14 6 - 20 MG/DL    Creatinine 0.96 0.70 - 1.30 MG/DL    BUN/Creatinine ratio 15 12 - 20      GFR est AA >60 >60 ml/min/1.73m2    GFR est non-AA >60 >60 ml/min/1.73m2    Calcium 8.4 (L) 8.5 - 10.1 MG/DL   CBC W/O DIFF Collection Time: 06/20/19  4:56 AM   Result Value Ref Range    WBC 8.2 4.1 - 11.1 K/uL    RBC 3.31 (L) 4.10 - 5.70 M/uL    HGB 10.0 (L) 12.1 - 17.0 g/dL    HCT 31.4 (L) 36.6 - 50.3 %    MCV 94.9 80.0 - 99.0 FL    MCH 30.2 26.0 - 34.0 PG    MCHC 31.8 30.0 - 36.5 g/dL    RDW 14.2 11.5 - 14.5 %    PLATELET 759 037 - 485 K/uL    MPV 12.0 8.9 - 12.9 FL    NRBC 0.0 0  WBC    ABSOLUTE NRBC 0.00 0.00 - 0.01 K/uL         Assessment     Catrina Patton is a 72 y. o.yr old male s/p laparoscopic appendectomy and R ureteral stenting    Plan     Increasing tube feeding today  Speech to see him today  DC ARIANA drain  Continue IV abx  Once he can swallow he can start PO and go home with his family  PPN renewed today.   Erivn Farias MD

## 2019-06-21 ENCOUNTER — APPOINTMENT (OUTPATIENT)
Dept: GENERAL RADIOLOGY | Age: 74
DRG: 853 | End: 2019-06-21
Attending: NURSE PRACTITIONER
Payer: MEDICARE

## 2019-06-21 LAB
ANION GAP SERPL CALC-SCNC: 7 MMOL/L (ref 5–15)
BUN SERPL-MCNC: 15 MG/DL (ref 6–20)
BUN/CREAT SERPL: 16 (ref 12–20)
CALCIUM SERPL-MCNC: 8.6 MG/DL (ref 8.5–10.1)
CHLORIDE SERPL-SCNC: 113 MMOL/L (ref 97–108)
CO2 SERPL-SCNC: 24 MMOL/L (ref 21–32)
CREAT SERPL-MCNC: 0.93 MG/DL (ref 0.7–1.3)
ERYTHROCYTE [DISTWIDTH] IN BLOOD BY AUTOMATED COUNT: 13.9 % (ref 11.5–14.5)
GLUCOSE SERPL-MCNC: 131 MG/DL (ref 65–100)
HCT VFR BLD AUTO: 31 % (ref 36.6–50.3)
HGB BLD-MCNC: 9.9 G/DL (ref 12.1–17)
MCH RBC QN AUTO: 29.9 PG (ref 26–34)
MCHC RBC AUTO-ENTMCNC: 31.9 G/DL (ref 30–36.5)
MCV RBC AUTO: 93.7 FL (ref 80–99)
NRBC # BLD: 0 K/UL (ref 0–0.01)
NRBC BLD-RTO: 0 PER 100 WBC
PLATELET # BLD AUTO: 320 K/UL (ref 150–400)
PMV BLD AUTO: 10.4 FL (ref 8.9–12.9)
POTASSIUM SERPL-SCNC: 4 MMOL/L (ref 3.5–5.1)
RBC # BLD AUTO: 3.31 M/UL (ref 4.1–5.7)
SODIUM SERPL-SCNC: 144 MMOL/L (ref 136–145)
WBC # BLD AUTO: 8.1 K/UL (ref 4.1–11.1)

## 2019-06-21 PROCEDURE — 74011250636 HC RX REV CODE- 250/636: Performed by: SURGERY

## 2019-06-21 PROCEDURE — 85027 COMPLETE CBC AUTOMATED: CPT

## 2019-06-21 PROCEDURE — 36415 COLL VENOUS BLD VENIPUNCTURE: CPT

## 2019-06-21 PROCEDURE — 74011000250 HC RX REV CODE- 250: Performed by: SURGERY

## 2019-06-21 PROCEDURE — 74011250637 HC RX REV CODE- 250/637: Performed by: SURGERY

## 2019-06-21 PROCEDURE — 80048 BASIC METABOLIC PNL TOTAL CA: CPT

## 2019-06-21 PROCEDURE — 74011000258 HC RX REV CODE- 258: Performed by: SURGERY

## 2019-06-21 PROCEDURE — 74018 RADEX ABDOMEN 1 VIEW: CPT

## 2019-06-21 PROCEDURE — 65660000000 HC RM CCU STEPDOWN

## 2019-06-21 PROCEDURE — 92526 ORAL FUNCTION THERAPY: CPT | Performed by: SPEECH-LANGUAGE PATHOLOGIST

## 2019-06-21 PROCEDURE — 77030019563 HC DEV ATTCH FEED HOLL -A

## 2019-06-21 RX ADMIN — PIPERACILLIN AND TAZOBACTAM 3.38 G: 3; .375 INJECTION, POWDER, FOR SOLUTION INTRAVENOUS at 15:00

## 2019-06-21 RX ADMIN — TAMSULOSIN HYDROCHLORIDE 0.4 MG: 0.4 CAPSULE ORAL at 09:00

## 2019-06-21 RX ADMIN — DONEPEZIL HYDROCHLORIDE 10 MG: 10 TABLET, FILM COATED ORAL at 09:00

## 2019-06-21 RX ADMIN — ZONISAMIDE 100 MG: 100 CAPSULE ORAL at 09:02

## 2019-06-21 RX ADMIN — VALPROATE SODIUM 125 MG: 100 INJECTION, SOLUTION INTRAVENOUS at 23:09

## 2019-06-21 RX ADMIN — CARBIDOPA AND LEVODOPA 0.5 TABLET: 25; 100 TABLET ORAL at 23:10

## 2019-06-21 RX ADMIN — HYDROMORPHONE HYDROCHLORIDE 1 MG: 1 INJECTION, SOLUTION INTRAMUSCULAR; INTRAVENOUS; SUBCUTANEOUS at 16:04

## 2019-06-21 RX ADMIN — CARBIDOPA AND LEVODOPA 0.5 TABLET: 25; 100 TABLET ORAL at 16:04

## 2019-06-21 RX ADMIN — Medication 10 ML: at 20:17

## 2019-06-21 RX ADMIN — Medication 10 ML: at 03:54

## 2019-06-21 RX ADMIN — FAMOTIDINE 20 MG: 10 INJECTION, SOLUTION INTRAVENOUS at 09:02

## 2019-06-21 RX ADMIN — PIPERACILLIN AND TAZOBACTAM 3.38 G: 3; .375 INJECTION, POWDER, FOR SOLUTION INTRAVENOUS at 05:07

## 2019-06-21 RX ADMIN — VALPROATE SODIUM 125 MG: 100 INJECTION, SOLUTION INTRAVENOUS at 03:52

## 2019-06-21 RX ADMIN — HYDROMORPHONE HYDROCHLORIDE 1 MG: 1 INJECTION, SOLUTION INTRAMUSCULAR; INTRAVENOUS; SUBCUTANEOUS at 06:48

## 2019-06-21 RX ADMIN — SERTRALINE HYDROCHLORIDE 150 MG: 50 TABLET ORAL at 09:01

## 2019-06-21 RX ADMIN — VALPROATE SODIUM 125 MG: 100 INJECTION, SOLUTION INTRAVENOUS at 16:04

## 2019-06-21 RX ADMIN — HYDROMORPHONE HYDROCHLORIDE 1 MG: 1 INJECTION, SOLUTION INTRAMUSCULAR; INTRAVENOUS; SUBCUTANEOUS at 20:17

## 2019-06-21 RX ADMIN — VALPROATE SODIUM 125 MG: 100 INJECTION, SOLUTION INTRAVENOUS at 10:00

## 2019-06-21 RX ADMIN — PIPERACILLIN AND TAZOBACTAM 3.38 G: 3; .375 INJECTION, POWDER, FOR SOLUTION INTRAVENOUS at 23:09

## 2019-06-21 RX ADMIN — FAMOTIDINE 20 MG: 10 INJECTION, SOLUTION INTRAVENOUS at 20:17

## 2019-06-21 RX ADMIN — HEPARIN SODIUM 5000 UNITS: 5000 INJECTION INTRAVENOUS; SUBCUTANEOUS at 09:02

## 2019-06-21 RX ADMIN — MEMANTINE HYDROCHLORIDE 10 MG: 10 TABLET ORAL at 09:01

## 2019-06-21 RX ADMIN — MEMANTINE HYDROCHLORIDE 10 MG: 10 TABLET ORAL at 17:25

## 2019-06-21 RX ADMIN — Medication 10 ML: at 06:49

## 2019-06-21 RX ADMIN — HEPARIN SODIUM 5000 UNITS: 5000 INJECTION INTRAVENOUS; SUBCUTANEOUS at 01:53

## 2019-06-21 RX ADMIN — CARBIDOPA AND LEVODOPA 0.5 TABLET: 25; 100 TABLET ORAL at 09:02

## 2019-06-21 NOTE — ROUTINE PROCESS
Called Dr Giulia Em (on call for Dr Emily Jimenez) to ensure doctor would review patient restraint order.

## 2019-06-21 NOTE — PROGRESS NOTES
Assumed care of pt this am. Pt is drowsy this morning due to pain medications. Pt family at bedside with speech pathology. Nursing and speech have explained the risks of aspiration, family wants the NG tube out now. Tube out and MD paged. PA notified. Family want pt to be able to eat. Nursing again explained the risk for aspiration and possible death to family. PPN rate decreased per PA.

## 2019-06-21 NOTE — PROGRESS NOTES
Bedside shift change report given to Betty Siegel 1313 (oncoming nurse) by Siri Perera (offgoing nurse). Report included the following information SBAR, Procedure Summary, Intake/Output, MAR and Recent Results, need for doctor to renew restraint order  .

## 2019-06-21 NOTE — PROGRESS NOTES
Problem: Dysphagia (Adult)  Goal: *Acute Goals and Plan of Care (Insert Text)  Description  Speech therapy goals  initiated 6/18/2019   1. Patient will participate in re-assessment of swallow function within 7 days  MET  2. New goal 6/21/19  Patient will tolerate full liquid diet without overt s/s aspiration within 7 days. Outcome: Progressing Towards Goal    SPEECH LANGUAGE PATHOLOGY DYSPHAGIA TREATMENT  Patient: Jaylyn Newton (64 y.o. male)  Date: 6/21/2019  Diagnosis: Appendicitis with perforation [K35.32]  Appendicitis with perforation [K35.32]  Appendicitis with perforation [K35.32] <principal problem not specified>  Procedure(s) (LRB):  CYSTOSCOPY  INSERTION OF RIGHT URETERAL STENT, LAPAROSCOPY GENERAL DIAGNOSTIC, ,APPENDECTOMY, (N/A)  Cystoscopy Right Ureteral Stent  Insertion (N/A) 6 Days Post-Op  Precautions: Aspiration      ASSESSMENT:  Patient drowsy but eyes open spontaneously and patient verbalizing. Patient continues with limited command following. NG tube removed. Patient demonstrated moderate-severe oropharyngeal dysphagia characterized by impaired bolus acceptance. retrieval, slow oral manipulation, premature spillage of bolus into pharynx and delayed/absent pharyngeal swallow. Patient continues with belching and throat clearing although not after each PO trials as yesterday (Belching x 1, and throat clearing x 2). Given bedside presentation feel patient remains at high risk for aspiration. Daughters request patient have water and ice cream and verbalize understanding of aspiration risk. Daughters feel patient is near baseline with respect to swallow skills. Full liquid diet would allow for caregivers request.  Progression toward goals:  ?         Improving appropriately and progressing toward goals  ? Improving slowly and progressing toward goals  ?          Not making progress toward goals and plan of care will be adjusted     PLAN:  Recommendations and Planned Interventions:  Oral care  Full liquid diet  Sit up for all PO and 30 minutes after  Patient continues to benefit from skilled intervention to address the above impairments. Continue treatment per established plan of care. Discharge Recommendations: To Be Determined     SUBJECTIVE:   Patient stated ? Let's go home? Elba Cain Both daughters, grandson, caregiver and RN at bedside. Lengthy discussion with daughters regarding aspiration risk, dysphagia and progress of dysphagia with dementia and plan of care. RN of great assistance in conversation. OBJECTIVE:   Cognitive and Communication Status:  Neurologic State: Alert, Drowsy, Eyes open spontaneously  Orientation Level: Disoriented X4  Cognition: Decreased command following, Decreased attention/concentration, Impaired decision making, Memory loss  Perception: Cues to maintain midline in sitting  Perseveration: No perseveration noted  Safety/Judgement: Not assessed  Dysphagia Treatment:  Oral Assessment:  Oral Assessment  Labial: Decreased rate  Dentition: Natural;Intact  Oral Hygiene: Moist and clean. Oral care completed. Lingual: (Unable to assess due to poor commnad following)  Velum: Unable to visualize  P.O. Trials:  Patient Position: Upright in bed, leaning to left  Vocal quality prior to P.O.: No impairment  Consistency Presented: Ice chips; Thin liquid;Puree  How Presented: SLP-fed/presented;Spoon;Straw     Bolus Acceptance: Impaired  Bolus Formation/Control: Impaired  Type of Impairment: Delayed;Mastication;Premature spillage  Propulsion: Delayed (# of seconds)  Oral Residue: None  Initiation of Swallow: Delayed (# of seconds)  Laryngeal Elevation: Decreased  Aspiration Signs/Symptoms: Clear throat  Pharyngeal Phase Characteristics: (Pharyngeal swallow delay, reduced laryngeal elevation)             Oral Phase Severity: Moderate-severe  Pharyngeal Phase Severity : Moderate-severe  After treatment:   ?              Patient left in no apparent distress sitting up in chair  ? Patient left in no apparent distress in bed  ? Call bell left within reach  ? Nursing notified  ? Caregiver present  ? Bed alarm activated    COMMUNICATION/EDUCATION:   Patient was educated regarding role of SLP, aspiration risk, diet consistency and POC. Daughters reports baseline difficulty swallowing and report concern regarding patient limited due to NG tube and change in routine. Daughters report they understand he was having difficulty swallowing and report they know he will continue to have difficulty swallowing. Discussed diet consistencies and possibility patient may require modified diet. Daughters report he would not want feeding tube however report they do not believe he currently unable to swallow functionally. While daughters/caregivers report understanding of risk of aspiration feel they not fully be aware that swallow skills limited now. They feel this may be an issue in the future. The patient?s plan of care including recommendations, planned interventions, and recommended diet changes were discussed with: Registered Nurse. ? Posted safety precautions in patient's room.     DAWSON Tim  Time Calculation: 33 mins

## 2019-06-21 NOTE — PROGRESS NOTES
CM discussed discharge planning with patient's daughter Gregor [facility]. daughter confirmed patient will be discharged home with 24/7 private care. Daughter said we could use [facility] for transportation at time of discharge. CM will continue to follow for discharge. Andres Moya MSA, RN, CRM.

## 2019-06-21 NOTE — PROGRESS NOTES
06/20/19 2351   Vitals   Temp 98.8 °F (37.1 °C)   Temp Source Axillary   Pulse (Heart Rate) 99   Heart Rate Source Monitor   Resp Rate 21   O2 Sat (%) 94 %   Level of Consciousness Responds to Voice   /69   MAP (Calculated) 89   BP 1 Location Left arm   BP 1 Method Automatic   BP Patient Position At rest   Cardiac Rhythm NSR   MEWS Score 3   Pain 1   Pain Scale 1 Adult Nonverbal Pain Scale   Patient Observation   Patient Turned Turn on right side   Observations sleeping   Ambulate No (Comment)   Activity Sleeping   MEWS 3 will continue to monitor patient

## 2019-06-21 NOTE — PROGRESS NOTES
Bedside shift change report given to [place] (oncoming nurse) by Shwetha Reyes (offgoing nurse).  Report included the following information SBAR, Kardex, MAR, Recent Results and Cardiac Rhythm ST.

## 2019-06-21 NOTE — PROGRESS NOTES
General Surgery Daily Progress Note    Admit Date: 2019  Post-Operative Day: 6 Days Post-Op from Procedure(s):  CYSTOSCOPY  INSERTION OF RIGHT URETERAL STENT, LAPAROSCOPY GENERAL DIAGNOSTIC, ,APPENDECTOMY,  Cystoscopy Right Ureteral Stent  Insertion     Subjective:     Last 24 hrs: Pt confused, yelling out; NG pulled out this am by pt. Speech has seen pt and started full liquids. Risk of aspiration is high and family aware. They do not want NG and want pt to have fulls. Pt passing gas and moving bowels. Objective:     Blood pressure 163/81, pulse 95, temperature 97.7 °F (36.5 °C), resp. rate 18, height 5' 11\" (1.803 m), weight 250 lb (113.4 kg), SpO2 94 %. Temp (24hrs), Av.6 °F (37 °C), Min:97.6 °F (36.4 °C), Max:99.7 °F (37.6 °C)      _____________________  Physical Exam:     Alert, yelling out, doesn't want to be bothered, lots of family at bedside  Cardiovascular: RRR, no peripheral edema  Lungs:CTAB anteriorally  Abdomen: distended, some distant BS, lap sites intact      Assessment:   Active Problems:    Appendicitis with perforation (2019)            Plan:     Full liq diet  No restraints as pt has someone   Renew PPN, will need new recs from RD since off TF  Gi/dvt proph  Cont zosyn  Am labs    Data Review:    Recent Labs     196 19  0406   WBC 8.1 8.2 9.3   HGB 9.9* 10.0* 10.6*   HCT 31.0* 31.4* 32.9*    240 112*     Recent Labs     197 19  0456    145   K 4.0 3.6   * 113*   CO2 24 26   * 137*   BUN 15 14   CREA 0.93 0.96   CA 8.6 8.4*     No results for input(s): AML, LPSE in the last 72 hours.         ______________________  Medications:    Current Facility-Administered Medications   Medication Dose Route Frequency    TPN ADULT - PERIPHERAL   IntraVENous CONTINUOUS    albuterol-ipratropium (DUO-NEB) 2.5 MG-0.5 MG/3 ML  3 mL Nebulization Q4H PRN    zonisamide (ZONEGRAN) 10 mg/mL oral suspension (compound) 100 mg  100 mg Per NG tube DAILY    famotidine (PF) (PEPCID) 20 mg in sodium chloride 0.9% 10 mL injection  20 mg IntraVENous Q12H    valproate (DEPACON) 125 mg in 0.9% sodium chloride 50 mL IVPB  125 mg IntraVENous Q6H    acetaminophen (TYLENOL) solution 1,000 mg  1,000 mg Per NG tube Q6H PRN    heparin (porcine) injection 5,000 Units  5,000 Units SubCUTAneous Q8H    0.45% sodium chloride with KCl 20 mEq/L infusion   IntraVENous CONTINUOUS    piperacillin-tazobactam (ZOSYN) 3.375 g in 0.9% sodium chloride (MBP/ADV) 100 mL  3.375 g IntraVENous Q8H    carbidopa-levodopa (SINEMET)  mg per tablet 0.5 Tab  0.5 Tab Oral TID    tamsulosin (FLOMAX) capsule 0.4 mg  0.4 mg Oral DAILY    sertraline (ZOLOFT) tablet 150 mg  150 mg Oral DAILY    sodium chloride (NS) flush 5-40 mL  5-40 mL IntraVENous Q8H    sodium chloride (NS) flush 5-40 mL  5-40 mL IntraVENous PRN    HYDROmorphone (PF) (DILAUDID) injection 1 mg  1 mg IntraVENous Q4H PRN    naloxone (NARCAN) injection 0.4 mg  0.4 mg IntraVENous PRN    ondansetron (ZOFRAN) injection 4 mg  4 mg IntraVENous Q4H PRN    memantine (NAMENDA) tablet 10 mg  10 mg Oral BID    donepezil (ARICEPT) tablet 10 mg  10 mg Oral DAILY       Eleazar Mccarthy NP  6/21/2019    Patient independently interviewed and examined; agree with NP assessment and plan. Thus far tolerating NGT removal and advancement of diet to full liquids. KUB with dilated small bowel but gas is present in colon and he is having bowel movements; benign abdominal exam. Continue full liquids. Family desires discharge to home if he continues to tolerate full liquids. Plan discussed with daughter Janeen Valdes who acknowledges understanding.

## 2019-06-22 VITALS
HEIGHT: 71 IN | BODY MASS INDEX: 35.25 KG/M2 | SYSTOLIC BLOOD PRESSURE: 145 MMHG | OXYGEN SATURATION: 100 % | WEIGHT: 251.77 LBS | RESPIRATION RATE: 17 BRPM | HEART RATE: 86 BPM | TEMPERATURE: 98.9 F | DIASTOLIC BLOOD PRESSURE: 84 MMHG

## 2019-06-22 LAB
ANION GAP SERPL CALC-SCNC: 7 MMOL/L (ref 5–15)
BUN SERPL-MCNC: 16 MG/DL (ref 6–20)
BUN/CREAT SERPL: 17 (ref 12–20)
CALCIUM SERPL-MCNC: 8.7 MG/DL (ref 8.5–10.1)
CHLORIDE SERPL-SCNC: 111 MMOL/L (ref 97–108)
CO2 SERPL-SCNC: 27 MMOL/L (ref 21–32)
CREAT SERPL-MCNC: 0.92 MG/DL (ref 0.7–1.3)
GLUCOSE SERPL-MCNC: 89 MG/DL (ref 65–100)
MAGNESIUM SERPL-MCNC: 2.3 MG/DL (ref 1.6–2.4)
PHOSPHATE SERPL-MCNC: 3.3 MG/DL (ref 2.6–4.7)
POTASSIUM SERPL-SCNC: 4.3 MMOL/L (ref 3.5–5.1)
SODIUM SERPL-SCNC: 145 MMOL/L (ref 136–145)

## 2019-06-22 PROCEDURE — 74011250636 HC RX REV CODE- 250/636: Performed by: SURGERY

## 2019-06-22 PROCEDURE — 80048 BASIC METABOLIC PNL TOTAL CA: CPT

## 2019-06-22 PROCEDURE — 36415 COLL VENOUS BLD VENIPUNCTURE: CPT

## 2019-06-22 PROCEDURE — 74011000250 HC RX REV CODE- 250: Performed by: SURGERY

## 2019-06-22 PROCEDURE — 83735 ASSAY OF MAGNESIUM: CPT

## 2019-06-22 PROCEDURE — 74011250637 HC RX REV CODE- 250/637: Performed by: SURGERY

## 2019-06-22 PROCEDURE — 84100 ASSAY OF PHOSPHORUS: CPT

## 2019-06-22 PROCEDURE — 74011000258 HC RX REV CODE- 258: Performed by: SURGERY

## 2019-06-22 RX ADMIN — SERTRALINE HYDROCHLORIDE 150 MG: 50 TABLET ORAL at 08:57

## 2019-06-22 RX ADMIN — DONEPEZIL HYDROCHLORIDE 10 MG: 10 TABLET, FILM COATED ORAL at 08:57

## 2019-06-22 RX ADMIN — FAMOTIDINE 20 MG: 10 INJECTION, SOLUTION INTRAVENOUS at 08:56

## 2019-06-22 RX ADMIN — HEPARIN SODIUM 5000 UNITS: 5000 INJECTION INTRAVENOUS; SUBCUTANEOUS at 08:56

## 2019-06-22 RX ADMIN — TAMSULOSIN HYDROCHLORIDE 0.4 MG: 0.4 CAPSULE ORAL at 08:57

## 2019-06-22 RX ADMIN — ZONISAMIDE 100 MG: 100 CAPSULE ORAL at 09:19

## 2019-06-22 RX ADMIN — VALPROATE SODIUM 125 MG: 100 INJECTION, SOLUTION INTRAVENOUS at 03:46

## 2019-06-22 RX ADMIN — Medication 10 ML: at 06:17

## 2019-06-22 RX ADMIN — CARBIDOPA AND LEVODOPA 0.5 TABLET: 25; 100 TABLET ORAL at 08:57

## 2019-06-22 RX ADMIN — VALPROATE SODIUM 125 MG: 100 INJECTION, SOLUTION INTRAVENOUS at 09:20

## 2019-06-22 RX ADMIN — HEPARIN SODIUM 5000 UNITS: 5000 INJECTION INTRAVENOUS; SUBCUTANEOUS at 01:25

## 2019-06-22 RX ADMIN — MEMANTINE HYDROCHLORIDE 10 MG: 10 TABLET ORAL at 09:19

## 2019-06-22 RX ADMIN — PIPERACILLIN AND TAZOBACTAM 3.38 G: 3; .375 INJECTION, POWDER, FOR SOLUTION INTRAVENOUS at 06:17

## 2019-06-22 NOTE — DISCHARGE INSTRUCTIONS
Patient Discharge Instructions    Julia Chavez / [id number] : 1945    Admitted 2019 Discharged: 2019       LAPAROSCOPIC APPENDECTOMY      FOLLOW-UP:  Please make an appointment with your physician in 10 - 14 day(s). Call your physician immediately if you have any fevers greater than 101.5, drainage from your wound that is not clear or looks infected, persistent bleeding, increasing abdominal pain, problems urinating, or persistent nausea/vomiting. You should be aware that you may have shoulder pain after surgery and that this will progressively go away. This is called 'referred pain' and is from the area of the diaphragm caused by gas that may be trapped under the diaphragm from laparoscopic surgery. This gas will progressively get reabsorbed by your body. WOUND CARE INSTRUCTIONS:   You may shower at home. If clothing rubs against the wound or causes irritation and the wound is not draining you may cover it with a dry dressing during the daytime. Try to keep the wound dry and avoid ointments on the wound unless directed to do so. If the wound becomes bright red and painful or starts to drain infected material that is not clear, please contact your physician immediately. You should also call if you begin to drain fluid that is thin and greenish-brown from the wound and appears to look like bile. If the wound though is mildly pink and has a thick firm ridge underneath it, this is normal, and is referred to as a healing ridge. This will resolve over the next 4-6 weeks. Place an ice pack on the incisions for the next 48 hours. After that, you may use a heating pad if you feel muscle tightening or pulling. DIET:  You may eat any foods that you can tolerate. It is a good idea to eat a high fiber diet and take in plenty of fluids to prevent constipation.   If you do become constipated you may want to take a mild laxative or take ducolax tablets on a daily basis until your bowel habits are regular. Constipation can be very uncomfortable, along with straining, after recent abdominal surgery. ACTIVITY:  You are encouraged to cough and deep breath or use your incentive spirometer if you were given one, every 15-30 minutes when awake. This will help prevent respiratory complications and low grade fevers post-operatively. You may want to hug a pillow when coughing and sneezing to add additional support to the surgical area(s) which will decrease pain during these times. You are encouraged to walk and engage in light activity for the next two weeks. You should not lift more than 20 pounds during this time frame as it could put you at increased risk for a post-operative hernia. Twenty pounds is roughly equivalent to a plastic bag of groceries. · Most people are able to return to work within 1 to 2 weeks after surgery. · You may shower 24 hours after surgery. Pat the cut (incision) dry. Do not take a bath for the first week. · Your doctor will tell you when you can have sex again. MEDICATIONS:  Try to take narcotic medications and anti-inflammatory medications, such as tylenol, ibuprofen, naprosyn, etc., with food. This will minimize stomach upset from the medication. Should you develop nausea and vomiting from the pain medication, or develop a rash, please discontinue the medication and contact your physician. You should not drive, make important decisions, or operate machinery when taking narcotic pain medication. · It is important that you take the medication exactly as they are prescribed. · Keep your medication in the bottles provided by the pharmacist and keep a list of the medication names, dosages, and times to be taken in your wallet. · Do not take other medications without consulting your doctor. · Take {Nsaid list:23420} as scheduled then combine with {Narcotic list:12135} as needed for severe pain.       QUESTIONS:  Please feel free to call Dr. Ada Chambers office ([phone]) if you have any questions, and they will be glad to assist you. Follow-up with Dr. Сергей Herrera {follow FN:[id number]}. Call the office to schedule your appointment. Information obtained by :    I understand that if any problems occur once I am at home I am to contact my physician. I understand and acknowledge receipt of the instructions indicated above. Physician's or R.N.'s Signature                                                                  Date/Time                                                                                                                                              Patient or Representative Signature                                                          Date/Time         Patient Education        Ureteral Stent Placement: What to Expect at Home  Your Recovery    A ureteral (say \"you-REE-ter-ul\") stent is a thin, hollow tube that is placed in the ureter to help urine pass from the kidney into the bladder. Ureters are the tubes that connect the kidneys to the bladder. You may have a small amount of blood in your urine for 1 to 3 days after the procedure. While the stent is in place, you may have to urinate more often, feel a sudden need to urinate, or feel like you cannot completely empty your bladder. You may feel some pain when you urinate or do strenuous activity. You also may notice a small amount of blood in your urine after strenuous activities. These side effects usually do not prevent people from doing their normal daily activities. You may have a thin string coming out of your urethra. Your urethra is the tube that carries urine from your bladder to outside your body. This string is attached to the stent. Try not to pull on the string. The doctor will use the string to pull out the stent when you no longer need it.   After the procedure, urine may flow better from your kidneys to your bladder. A ureteral stent may be left in place for several days or for as long as several months. Your doctor will take it out when you no longer need it. This care sheet gives you a general idea about how long it will take for you to recover. But each person recovers at a different pace. Follow the steps below to get better as quickly as possible. How can you care for yourself at home? Activity    · Rest when you feel tired. Getting enough sleep will help you recover.     · Avoid strenuous activities, such as bicycle riding, jogging, weight lifting, or aerobic exercise, until your doctor says it is okay.     · Ask your doctor when you can drive again.     · Most people are able to return to work the day after the procedure. If your work requires intense activity, you may feel pain in your kidney area or get tired easily. If this happens, you may need to do less strenuous activities while the stent is in.     · Ask your doctor when it is okay for you to have sex. Diet    · You can eat your normal diet. If your stomach is upset, try bland, low-fat foods like plain rice, broiled chicken, toast, and yogurt.     · Drink plenty of fluids (unless your doctor tells you not to). Medicines    · Your doctor will tell you if and when you can restart your medicines. He or she will also give you instructions about taking any new medicines.     · If you take blood thinners, such as warfarin (Coumadin), clopidogrel (Plavix), or aspirin, be sure to talk to your doctor. He or she will tell you if and when to start taking those medicines again. Make sure that you understand exactly what your doctor wants you to do.     · Be safe with medicines. Take pain medicines exactly as directed. ? If the doctor gave you a prescription medicine for pain, take it as prescribed.   ? If you are not taking a prescription pain medicine, ask your doctor if you can take an over-the-counter medicine.     · If you think your pain medicine is making you sick to your stomach:  ? Take your medicine after meals (unless your doctor has told you not to). ? Ask your doctor for a different pain medicine.     · If your doctor prescribed antibiotics, take them as directed. Do not stop taking them just because you feel better. You need to take the full course of antibiotics. Follow-up care is a key part of your treatment and safety. Be sure to make and go to all appointments, and call your doctor if you are having problems. It's also a good idea to know your test results and keep a list of the medicines you take. When should you call for help? Call 911 anytime you think you may need emergency care. For example, call if:    · You passed out (lost consciousness).     · You have severe trouble breathing.     · You have sudden chest pain and shortness of breath, or you cough up blood.     · You have severe belly pain.    Call your doctor now or seek immediate medical care if:    · Part or all of the stent comes out of your urethra.     · You have pain that does not get better after you take pain medicine.     · You have symptoms of a urinary infection. For example:  ? You have blood or pus in your urine. ? You have pain in your back just below your rib cage. This is called flank pain. ? You have a fever, chills, or body aches. ? It hurts to urinate. ? You have groin or belly pain.     · You cannot control when you urinate, or you leak urine.    Watch closely for changes in your health, and be sure to contact your doctor if you have any problems. Where can you learn more? Go to http://carolyn-warner.info/. Enter M323 in the search box to learn more about \"Ureteral Stent Placement: What to Expect at Home. \"  Current as of: March 20, 2018  Content Version: 11.9  © 3655-5629 [employer].  Care instructions adapted under license by [employer] (which disclaims liability or warranty for this information). If you have questions about a medical condition or this instruction, always ask your healthcare professional. [address] any warranty or liability for your use of this information.

## 2019-06-22 NOTE — ROUTINE PROCESS
Bedside shift change report given to [address] [place] [address] and Carin Hammans RN (oncoming nurse) by Gwenyth Lesch (offgoing nurse).  Report included the following information SBAR, OR Summary, Procedure Summary, Intake/Output, MAR and Recent Results, Notes from Speech Therapy and swallow test.

## 2019-06-22 NOTE — PROGRESS NOTES
Progress Note    Patient: Evan Fry MRN: [id number]  SSN: [id number]    YOB: 1945  Age: 72 y.o. Sex: male      Admit Date: 2019    7 Days Post-Op    Procedure:  Procedure(s):  CYSTOSCOPY  INSERTION OF RIGHT URETERAL STENT, LAPAROSCOPY GENERAL DIAGNOSTIC, ,APPENDECTOMY,  Cystoscopy Right Ureteral Stent  Insertion    Subjective:     Patient has been tolerating full liquids. He has BM and flatus    Objective:     Visit Vitals  BP (P) 145/84 (BP 1 Location: Right arm, BP Patient Position: At rest)   Pulse (P) 86   Temp (P) 98.9 °F (37.2 °C)   Resp (P) 17   Ht 5' 11\" (1.803 m)   Wt 251 lb 12.3 oz (114.2 kg)   SpO2 (P) 100%   BMI 35.11 kg/m²       Temp (24hrs), Av.8 °F (37.1 °C), Min:97.8 °F (36.6 °C), Max:99.4 °F (37.4 °C)      Physical Exam:    Gen- Alert in NAD  Abd- Soft and appropriately tender. Incisions intact      Data Review: images and reports reviewed    Lab Review: All lab results for the last 24 hours reviewed.   Recent Results (from the past 24 hour(s))   METABOLIC PANEL, BASIC    Collection Time: 19  3:57 AM   Result Value Ref Range    Sodium 145 136 - 145 mmol/L    Potassium 4.3 3.5 - 5.1 mmol/L    Chloride 111 (H) 97 - 108 mmol/L    CO2 27 21 - 32 mmol/L    Anion gap 7 5 - 15 mmol/L    Glucose 89 65 - 100 mg/dL    BUN 16 6 - 20 MG/DL    Creatinine 0.92 0.70 - 1.30 MG/DL    BUN/Creatinine ratio 17 12 - 20      GFR est AA >60 >60 ml/min/1.73m2    GFR est non-AA >60 >60 ml/min/1.73m2    Calcium 8.7 8.5 - 10.1 MG/DL   MAGNESIUM    Collection Time: 19  3:57 AM   Result Value Ref Range    Magnesium 2.3 1.6 - 2.4 mg/dL   PHOSPHORUS    Collection Time: 19  3:57 AM   Result Value Ref Range    Phosphorus 3.3 2.6 - 4.7 MG/DL       Assessment:     Hospital Problems  Date Reviewed: 6/15/2019          Codes Class Noted POA    Appendicitis with perforation ICD-10-CM: K35.32  ICD-9-CM: 540.0  2019 Unknown              Plan/Recommendations/Medical Decision Making: I had a discussion with family regarding his diet. They understand that full liquids is not necessarily the diet recommended by Speech and they understand that he is an aspiration risk. He is otherwise stable for discharge.    Will D/c

## 2019-06-22 NOTE — PROGRESS NOTES
18:12 patient daughter was given discharge instructions and medication scripts with care notes; patient 's follow up appointment reviewed. Transportation just arrived, face sheet and Kardex given.   Jesus Abbott

## 2019-06-22 NOTE — PROGRESS NOTES
PT IS SCHEDULED TO BE DISCHARGED TODAY HOME. TRANSPORTATION HAS BEEN SET UP WITH [facility] TO PICK PT UP @ 1600. DISCHARGE ADDRESS CONFIRMED TO BE CORRECT. [place] WILL BE PLACED ON THE CHART FOR [facility]. NO ADDITIONAL NEEDS IDENTIFIED.  Elvia Combs LCSW

## 2019-06-27 ENCOUNTER — TELEPHONE (OUTPATIENT)
Dept: SURGERY | Age: 74
End: 2019-06-27

## 2019-07-12 NOTE — DISCHARGE SUMMARY
Physician Discharge Summary     Patient ID:  Reginald Shin  [id number]  20 y.o.  1945    Admit Date: 6/14/2019    Discharge Date: 6/22/2019    Admission Diagnoses: Appendicitis with perforation [K35.32]; Appendicitis with perforation [K35.32]; Appendicitis with perforation [K35.32]    Discharge Diagnoses: Active Problems:    Appendicitis with perforation (6/14/2019)         Admission Condition: Ana Ortiz    Discharge Condition: Good    Procedure(s):    6/15/2019 - Procedure(s):  CYSTOSCOPY  INSERTION OF RIGHT URETERAL STENT, LAPAROSCOPY GENERAL DIAGNOSTIC, ,APPENDECTOMY,  Cystoscopy Right Ureteral Stent  Insertion      Hospital Course:   He was admitted and had worsened overnight with the perforated appendicitis. He had surgery and was admitted to the ICU post op. He had a difficult time post op with the dementia complicating his ability to take PO safely. He did have a post op ileus and once bowel function improved the tube feeds were stopped. He was eventually able to try PO and then was ready for DC home on POD 8 with PO abx and the ARIANA drain was removed. Consults: None    Significant Diagnostic Studies: none    Disposition: home    Patient Instructions:   Cannot display discharge medications since this patient is not currently admitted.       Activity: No heavy lifting for 2 weeks  Diet: Regular Diet  Wound Care: Keep wound clean and dry    Follow-up with Bigg Rasheed MD in 2 week(s)  Follow-up tests/labs none    Signed:  Bigg Rasheed MD  7/12/2019  4:29 PM   .

## 2019-07-17 ENCOUNTER — OFFICE VISIT (OUTPATIENT)
Dept: SURGERY | Age: 74
End: 2019-07-17

## 2019-07-17 VITALS
BODY MASS INDEX: 34.3 KG/M2 | WEIGHT: 245 LBS | SYSTOLIC BLOOD PRESSURE: 120 MMHG | TEMPERATURE: 97.5 F | RESPIRATION RATE: 18 BRPM | HEART RATE: 108 BPM | OXYGEN SATURATION: 96 % | DIASTOLIC BLOOD PRESSURE: 70 MMHG | HEIGHT: 71 IN

## 2019-07-17 DIAGNOSIS — Z90.49 S/P LAPAROSCOPIC APPENDECTOMY: ICD-10-CM

## 2019-07-17 DIAGNOSIS — K35.32 APPENDICITIS WITH PERFORATION: ICD-10-CM

## 2019-07-17 DIAGNOSIS — Z09 POSTOPERATIVE EXAMINATION: Primary | ICD-10-CM

## 2019-07-17 NOTE — LETTER
7/17/19 Patient: Jaylyn Newton YOB: 1945 Date of Visit: 7/17/2019 Enrico Fierro MD 
[address] [address] VIA Facsimile: [phone] Dear Enrico Fierro MD, Thank you for referring Mr. Ramonita Lopez to [facility] for evaluation. My notes for this consultation are attached. If you have questions, please do not hesitate to call me. I look forward to following your patient along with you. Sincerely, Narendra Ramirez NP

## 2019-07-17 NOTE — PROGRESS NOTES
1. Have you been to the ER, urgent care clinic since your last visit? Hospitalized since your last visit? No    2. Have you seen or consulted any other health care providers outside of the [address] [place] [address] since your last visit? Include any pap smears or colon screening. Dr. Jose Luis Harmon 7/17/19.

## 2019-07-17 NOTE — PROGRESS NOTES
Subjective:      Lea Chou is a 72 y.o. male presents for postop care 38 days following laparoscopic appendectomy for perforated appendicitis by Dr. Sanju Alejandra. Pt also had uretal stents placed by Dr. Ksenia Pina. Recovery delayed with post op ileus. Pt discharged on 10 day course of Augmentin. Appetite is good. Eating a regular diet without difficulty. Bowel movements are  regular. The patient is not having any pain. .Denies fever, nausea, shortness of breath, chest pain, redness at incision site, vomiting and diarrhea. Pt following up with Dr. Ksenia Pina in regards to uretal stents and stone. Daughters reports that he is doing very well, good appetite, BMs,etc. Pt also has caretaker. Patient has an advanced directive: YES  Education material provided about advance directives: NO    Pathology:  Appendicitis with perforation and necrosis    Objective:     Visit Vitals  /70   Pulse (!) 108   Temp 97.5 °F (36.4 °C) (Oral)   Resp 18   Ht 5' 11\" (1.803 m)   Wt 245 lb (111.1 kg)   SpO2 96%   BMI 34.17 kg/m²       General:  Drowsy, no distress, accompanied by 2 daughters and caretaker. In wheelchair   Abdomen: soft, bowel sounds active, non-tender   Incision:   healing well, no drainage, no erythema, no seroma, no swelling, no dehiscence, incisions well approximated   Heart: regular rate and rhythm, S1, S2 normal, no murmur, click, rub or gallop   Lungs: clear to auscultation bilaterally     Assessment:     S/p lap appe and post op ileus    Plan:     1. Doing well. 2. Follow-up: prn      Mr. Libby Cain has a reminder for a \"due or due soon\" health maintenance. I have asked that he contact his primary care provider for follow-up on this health maintenance. Patient verbalized understanding and agreement.

## 2019-07-17 NOTE — PATIENT INSTRUCTIONS
Appendectomy: What to Expect at Home  Your Recovery    Your doctor removed your appendix either by making many small cuts, called incisions, in your belly (laparoscopic surgery) or through open surgery. In open surgery, the doctor makes one large incision. The incisions leave scars that usually fade over time. After your surgery, it is normal to feel weak and tired for several days after you return home. Your belly may be swollen and may be painful. If you had laparoscopic surgery, you may have pain in your shoulder for about 24 hours. You may also feel sick to your stomach and have diarrhea, constipation, gas, or a headache. This usually goes away in a few days. Your recovery time depends on the type of surgery you had. If you had laparoscopic surgery, you will probably be able to return to work or a normal routine 1 to 3 weeks after surgery. If you had an open surgery, it may take 2 to 4 weeks. If your appendix ruptured, you may have a drain in your incision. Your body will work fine without an appendix. You will not have to make any changes in your diet or lifestyle. This care sheet gives you a general idea about how long it will take for you to recover. But each person recovers at a different pace. Follow the steps below to get better as quickly as possible. How can you care for yourself at home? Activity    · Rest when you feel tired. Getting enough sleep will help you recover.     · Try to walk each day. Start by walking a little more than you did the day before. Bit by bit, increase the amount you walk. Walking boosts blood flow and helps prevent pneumonia and constipation.     · For about 2 weeks, avoid lifting anything that would make you strain.  This may include a child, heavy grocery bags and milk containers, a heavy briefcase or backpack, cat litter or dog food bags, or a vacuum .     · Avoid strenuous activities, such as bicycle riding, jogging, weight lifting, or aerobic exercise, until your doctor says it is okay.     · You may be able to take showers (unless you have a drain near your incision) 24 to 48 hours after surgery. Pat the incision dry. Do not take a bath for the first 2 weeks, or until your doctor tells you it is okay. If you have a drain near your incision, follow your doctor's instructions.     · You may drive when you are no longer taking pain medicine and can quickly move your foot from the gas pedal to the brake. You must also be able to sit comfortably for a long period of time, even if you do not plan on going far. You might get caught in traffic.     · You will probably be able to go back to work in 1 to 3 weeks. If you had an open surgery, it may take 3 to 4 weeks.     · Your doctor will tell you when you can have sex again. Diet    · You can eat your normal diet. If your stomach is upset, try bland, low-fat foods like plain rice, broiled chicken, toast, and yogurt.     · Drink plenty of fluids (unless your doctor tells you not to).     · You may notice that your bowel movements are not regular right after your surgery. This is common. Try to avoid constipation and straining with bowel movements. You may want to take a fiber supplement every day. If you have not had a bowel movement after a couple of days, ask your doctor about taking a mild laxative. Medicines    · Your doctor will tell you if and when you can restart your medicines. He or she will also give you instructions about taking any new medicines.     · If you take blood thinners, such as warfarin (Coumadin), clopidogrel (Plavix), or aspirin, be sure to talk to your doctor. He or she will tell you if and when to start taking those medicines again. Make sure that you understand exactly what your doctor wants you to do.     · If your appendix ruptured, you will need to take antibiotics. Take them as directed. Do not stop taking them just because you feel better. You need to take the full course of antibiotics.   · Be safe with medicines. Take pain medicines exactly as directed. ? If the doctor gave you a prescription medicine for pain, take it as prescribed. ? If you are not taking a prescription pain medicine, take an over-the-counter medicine such as acetaminophen (Tylenol), ibuprofen (Advil, Motrin), or naproxen (Aleve). Read and follow all instructions on the label. ? Do not take two or more pain medicines at the same time unless the doctor told you to. Many pain medicines have acetaminophen, which is Tylenol. Too much Tylenol can be harmful.     · If you think your pain medicine is making you sick to your stomach:  ? Take your medicine after meals (unless your doctor has told you not to). ? Ask your doctor for a different pain medicine. Incision care    · If you had an open surgery, you may have staples in your incision. The doctor will take these out in 7 to 10 days.     · If you have strips of tape on the incision, leave the tape on for a week or until it falls off.     · You may wash the area with warm, soapy water 24 to 48 hours after your surgery, unless your doctor tells you not to. Pat the area dry.     · Keep the area clean and dry. You may cover it with a gauze bandage if it weeps or rubs against clothing. Change the bandage every day.     · If your appendix ruptured, you may have an incision with packing in it. Change the packing as often as your doctor tells you to. ? Packing changes may hurt at first. Taking pain medicine about half an hour before you change the dressing can help. ? If your dressing sticks to your wound, try soaking it with warm water for about 10 minutes before you remove it. You can do this in the shower or by placing a wet washcloth over the dressing. ? Remove the old packing and flush the incision with water. Gently pat the top area dry. ? The size of the incision determines how much gauze you need to put inside.  Fold the gauze over once, but do not wad it up so that it hurts. Put it in the wound carefully. You want to keep the sides of the wound from touching. A cotton swab may help you push the gauze in as needed. ? Put a gauze pad over the wound, and tape it down. ? You may notice greenish gray fluid seeping from your wound as you start to heal. This is normal. It is a sign that your wound is healing. Follow-up care is a key part of your treatment and safety. Be sure to make and go to all appointments, and call your doctor if you are having problems. It's also a good idea to know your test results and keep a list of the medicines you take. When should you call for help? Call 911 anytime you think you may need emergency care. For example, call if:    · You passed out (lost consciousness).     · You are short of breath. Stephanie Cooks your doctor now or seek immediate medical care if:    · You are sick to your stomach and cannot drink fluids.     · You cannot pass stools or gas.     · You have pain that does not get better when you take your pain medicine.     · You have signs of infection, such as:  ? Increased pain, swelling, warmth, or redness. ? Red streaks leading from the wound. ? Pus draining from the wound. ? A fever.     · You have loose stitches, or your incision comes open.     · Bright red blood has soaked through the bandage over your incision.     · You have signs of a blood clot in your leg (called a deep vein thrombosis), such as:  ? Pain in your calf, back of knee, thigh, or groin. ? Redness and swelling in your leg or groin.    Watch closely for any changes in your health, and be sure to contact your doctor if you have any problems. Where can you learn more? Go to http://carolyn-warner.info/. Enter A486 in the search box to learn more about \"Appendectomy: What to Expect at Home. \"  Current as of: March 27, 2018  Content Version: 11.9  © 3379-9034 [employer].  Care instructions adapted under license by [employer] (which disclaims liability or warranty for this information). If you have questions about a medical condition or this instruction, always ask your healthcare professional. [address] any warranty or liability for your use of this information.

## 2019-09-20 ENCOUNTER — OFFICE VISIT (OUTPATIENT)
Dept: NEUROLOGY | Age: 74
End: 2019-09-20

## 2019-09-20 VITALS
HEIGHT: 71 IN | BODY MASS INDEX: 32.34 KG/M2 | HEART RATE: 90 BPM | WEIGHT: 231 LBS | OXYGEN SATURATION: 97 % | RESPIRATION RATE: 16 BRPM

## 2019-09-20 DIAGNOSIS — R26.9 ABNORMALITY OF GAIT AND MOBILITY: ICD-10-CM

## 2019-09-20 DIAGNOSIS — G30.1 DEMENTIA OF THE ALZHEIMER'S TYPE WITH LATE ONSET WITHOUT BEHAVIORAL DISTURBANCE (HCC): Primary | ICD-10-CM

## 2019-09-20 DIAGNOSIS — F02.80 DEMENTIA OF THE ALZHEIMER'S TYPE WITH LATE ONSET WITHOUT BEHAVIORAL DISTURBANCE (HCC): Primary | ICD-10-CM

## 2019-09-20 DIAGNOSIS — G20 PARKINSON'S DISEASE (TREMOR, STIFFNESS, SLOW MOTION, UNSTABLE POSTURE) (HCC): ICD-10-CM

## 2019-09-20 RX ORDER — VALSARTAN 80 MG/1
TABLET ORAL DAILY
COMMUNITY

## 2019-09-20 NOTE — PROGRESS NOTES
Consult    Subjective:     Jere Flores is a 69 y. o.right-handed  male seen today for evaluation at the request of Dr. Lexx Bermudez of new problem of recent ruptured appendix, then followed by kidney stones, both of which required surgery, and the patient had prolonged hospitalization at [facility] and was in the ICU for 1 week. They had a tough time controlling his agitation and confusion, but he finally did make at home. Patient has had some problem with swallowing, and seems to choke at times, and we went over the Heimlich maneuver with the staff, and also recommended that they do fist percussion between his shoulder blades that he aspirate, because he is so large they may have a tough time doing the Heimlich maneuver. We offered him speech therapy but was not optimistic that the speech therapy would help much because he cannot remember what to do, but did advise them to give him small bites of food, frequent sips of water, to slowly, make sure he empties his mouth, and do the chin tuck maneuver to help him swallow and avoid aspiration. Daughter wants him to be a full DNR. He had become somewhat agitated but is gotten a little bit better already been home for a while. Patient on Namzaric 28 mg a day, and on Sinemet 25/100 mg tablets 1/2 tablet 3 times a day, and zonisamide 100 mg twice a day for his Parkinson-like syndrome from his Zyprexa which he is now off of now. Patient also on 500 mg of the Depakote extended release, and we will increase that to 1000 mg at 5 PM his dinnertime to see if that helps his sundowning behavior without causing too much drowsiness or sedation. He tends to be sleeping more in the daytime and less at nighttime and hopefully this will also help that. He does see a psychiatrist Dr. Alexus Patten has not made any changes in medication recently.   He is walking some with assistance, but very little as he refuses to get up much, unless he is agitated, and would not cooperate with physical therapy and his exercise program so did not get much benefit out of that. He is also seen for increasing weakness and lack of mobility and some increasing tremors more when he tries to do things and more difficulty walking which he cannot do without assistance now and increasing confusion and memory loss and progressive dementia and increasing unsteady gait. Patient seems to have developed parkinsonian symptoms, with increased generalized rigidity, slowness of movement, bradykinesia, cogwheel rigidity, but not much resting tremor but he does seem to have a little bit of an essential tremor or static tremor. He also has masklike facies. He is probably getting EPS  from his Zyprexa which he has discontinued now. .  Less likely has primary Parkinson's disease. He does not actively hallucinating all that much. He has had no new focal weakness, but just generally weak all over. He seems to be much more rigid and generally slow and having difficulty moving and having a masslike facies. Is really not on any neuroleptics. Patient cannot use a cane or walker successfully because he does not have the cognitive ability to handle them. Patient cognition continues to deteriorate rather fast, the family is trying to handle him at home, and does have sitters in the house 7 days a week now for 24 hours a day. They give the patient his medication and his wife to her medication, arrange the meals, and try to get him to exercise some. The family does not want to place him in a nursing home and the patient themselves absolutely refuse to go to assisted living. He is on Depakote 500 mg once a day by his psychiatrist, and patient now on Zoloft 100 mg a day, with some recent weight loss, and also taking them Namzaric  28 mg once a day, and on Zyprexa 5 mg a day. Neurontin seemed to make him worse that was discontinued. He is no longer driving.  He has had progression of disease and no longer goes to work and his  watches him closely. Family has arranged a power of , living will and are considering long-term management. We discussed his condition with his family and office visit was 40 minutes today, 20 minutes of which was counseling the family. We tried a home health evaluation and therapy, but neither he nor his wife tolerated strangers in the house very well. The family notices that he is having more difficulty finishing sentences and finishing conversation. He has had no other new focal weakness sensory loss visual changes or gait problems or bowel or bladder problems. He has had a normal CT of the head and normal metabolic screening for treatable causes of his memory loss recently on a hospital visit he was found to have urinary tract infection. We discussed urinary suppression, we will try vitamin C 1000 mg first.  We had long counseling with patient and wife and her daughters, and he is to continue his Lexapro for anxiety and depression, and his wife's medications were adjusted. We also talked to the daughters, about family therapy. The patient has not had any new medical problems complications and or illnesses. He has a past history of memory loss, high blood pressure, anxiety and vasectomy. Past Medical History:   Diagnosis Date    Anxiety disorder     Depression     Essential hypertension     Memory disorder     Neurological disorder       Past Surgical History:   Procedure Laterality Date    HX OTHER SURGICAL      left big toe nail removed    HX VASECTOMY  1975     Family History   Problem Relation Age of Onset    Dementia Mother     Heart Disease Father       Social History     Tobacco Use    Smoking status: Never Smoker    Smokeless tobacco: Never Used   Substance Use Topics    Alcohol use: No       Current Outpatient Medications   Medication Sig Dispense Refill    valsartan (DIOVAN) 80 mg tablet Take  by mouth daily.  multivitamins chew Take 2 Each by mouth. Vitafusion 2 gummies      sertraline (ZOLOFT) 100 mg tablet Take 150 mg by mouth daily.  carbidopa-levodopa (SINEMET)  mg per tablet Take 0.5 Tabs by mouth three (3) times daily.  divalproex ER (DEPAKOTE ER) 500 mg ER tablet Take 500 mg by mouth daily.  zonisamide (ZONEGRAN) 100 mg capsule Take 1 Cap by mouth daily. 90 Cap 5    memantine-donepezil (NAMZARIC) 28-10 mg CSpX TAKE 1 CAPSULE BY MOUTH EVERY DAY 90 Cap 3    spironolactone (ALDACTONE) 25 mg tablet 25 mg two (2) times a day.  tamsulosin (FLOMAX) 0.4 mg capsule Take 0.4 mg by mouth daily.  VIT C/TERELL AC/LUT/COPPER/ZNOX (PRESERVISION LUTEIN PO) Take  by mouth.  aspirin 81 mg tablet Take 81 mg by mouth.  Cholecalciferol, Vitamin D3, (VITAMIN D3) 1,000 unit cap Take  by mouth. No Known Allergies     Review of Systems:  A comprehensive review of systems was negative except for: Neurological: positive for memory problems     Objective:     I      NEUROLOGICAL EXAM:    Appearance: The patient is well developed, well nourished, moderately overweight, provides an incoherent history and is in no acute distress. Mental Status: Oriented to place and person not the president and not the date. Patient can not remember one of 3 words at 30 seconds with distraction. Difficulty naming his children and his wife and thinks his daughter is his sister. Patient still a very mentally slow and has difficulty with words and names at times. Patient has difficult time doing clock drawing, and has moderate recent memory loss. Mood and affect appropriate but drowsy and sedated. Cranial Nerves:   Intact visual fields. Fundi are benign, disc are flat, no lesions seen on funduscopy. JAQUELIN, EOM's full, no nystagmus, no ptosis. Facial sensation is normal. Corneal reflexes are not tested. Facial movement is symmetric, but patient has masklike facies. Hearing is abnormal bilaterally.  Palate is midline with normal sternocleidomastoid and trapezius muscles are normal. Tongue is midline. Neck without meningismus or bruits  Temporal arteries are not tender or enlarged   Motor:  4/5 strength in upper and lower proximal and distal muscles. Normal bulk and increased tone eyes with cogwheel rigidity prominent in the upper extremities. . No fasciculations. Patient has decreased rapid alternating movement in all extremities   Reflexes:   Deep tendon reflexes 1+/4 and symmetrical.  No Babinski or clonus present   Sensory:   Normal to touch, pinprick and vibration and DSS. Gait:  Abnormal gait as patient is unsteady when he initially gets up and starts to move and on turns, and patient needs maximum assistance of one person to ambulate. Tremor:    Mild bilateral static and action tremor noted. Cerebellar:  Abnormal Romberg and tandem cerebellar signs present. Finger-nose-finger exam performed slowly but symmetrically   Neurovascular:  Normal heart sounds and regular rhythm, peripheral pulses decreased, and no carotid bruits. Assessment:         Plan:     Patient encouraged to take small bites of food, drink sips of water, took his chin, and his family is to continue the Heimlich maneuver and aspiration precautions. They do not want speech therapy because I do not think he will tolerate a standard  Patient with increasing sundowning from 5-7 each night, not sleeping well, increasing hallucinations and agitated behavior, we will increase his Depakote to 1000 mg at 5 PM at dinnertime each night, see if that helps the behavior and his not sleeping. Family advised GI side effects, pacing drowsiness or sedation or any side effect to call us immediately. We will continue his zonisamide, 100 mg, and his Sinemet 1/2 tablet 3 times a day for his Parkinson's disease and behavior and see how he does on that.   With increasing weakness and difficulty walking, associated with parkinsonian-like features probably secondary to his neuroleptics of Zyprexa which she is currently off of. He will continue Sinemet 25/100 mg one half pill 3 times a day   Moderate to severe dementia consistent with Alzheimer's disease  We advised him to watch his weight gain, and the family will monitor that. He is to continue Lexapro and Namzaric and neither melatonin nor Neurontin were tolerated  He is to continue his multivitamins and vitamin D on a regular basis and remained mentally and physically active  He is no longer driving or working at this time  Patient condition discussed with family and 20 minutes spent counseling the family, about the disease, medications and prognosis, and they were advised to start planning for long-term care, get power of  and living Will  We will see him again in 6 months, earlier if needed. He will call if any problems. Discussed his diagnosis, treatment options and the possibility of new medications in the future with the patient and his family     Signed By: Elder Tapia MD     September 20, 2019       This note will not be viewable in [address]

## 2020-01-29 DIAGNOSIS — F02.80 DEMENTIA OF THE ALZHEIMER'S TYPE WITH LATE ONSET WITHOUT BEHAVIORAL DISTURBANCE (HCC): ICD-10-CM

## 2020-01-29 DIAGNOSIS — G30.1 DEMENTIA OF THE ALZHEIMER'S TYPE WITH LATE ONSET WITHOUT BEHAVIORAL DISTURBANCE (HCC): ICD-10-CM

## 2020-02-24 DIAGNOSIS — F02.80 DEMENTIA OF THE ALZHEIMER'S TYPE WITH LATE ONSET WITHOUT BEHAVIORAL DISTURBANCE (HCC): ICD-10-CM

## 2020-02-24 DIAGNOSIS — G30.1 DEMENTIA OF THE ALZHEIMER'S TYPE WITH LATE ONSET WITHOUT BEHAVIORAL DISTURBANCE (HCC): ICD-10-CM

## 2020-03-24 NOTE — LETTER
2/22/2019 1:06 PM 
 
Patient:  Katy Chaves YOB: 1945 Date of Visit: 2/22/2019 Dear No Recipients: Thank you for referring Mr. Jake Menchaca to me for evaluation/treatment. Below are the relevant portions of my assessment and plan of care. Consult Subjective:  
 
Katy Chaves is a 72 y. o.right-handed  male seen today for evaluation at the request of Dr. Hulon Gitelman of [facility] sundowning each night between 5 and 7 PM with increasing difficulty with behavior, sometimes getting agitated and striking out at his caregivers, sometimes hallucinating and seeing people, yelling and swearing at his caregivers and refusing to take his medications at times. 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 Patient also on 500 mg of the Depakote extended release, and we will increase that to 1000 mg at 5 PM his dinnertime to see if that helps his sundowning behavior without causing too much drowsiness or sedation. He tends to be sleeping more in the daytime and less at nighttime and hopefully this will also help that. He does see a psychiatrist Dr. Roland Weiner has not made any changes in medication recently. He is walking some with assistance, but very little as he refuses to get up much, unless he is agitated, and would not cooperate with physical therapy and his exercise program so did not get much benefit out of that. He is also seen for increasing weakness and lack of mobility and some increasing tremors more when he tries to do things and more difficulty walking which he cannot do without assistance now and increasing confusion and memory loss and progressive dementia and increasing unsteady gait.   Patient seems to have developed parkinsonian symptoms, with increased generalized rigidity, slowness of movement, bradykinesia, cogwheel rigidity, but not much resting tremor but he does seem to have a little bit of an essential tremor or static tremor. He also has masklike facies. He is probably getting EPS  from his Zyprexa. Less likely has primary Parkinson's disease. He does not actively hallucinating all that much. He has had no new focal weakness, but just generally weak all over. He seems to be much more rigid and generally slow and having difficulty moving and having a masslike facies. Is really not on any neuroleptics. Patient cannot use a cane or walker successfully because he does not have the cognitive ability to handle them. Patient cognition continues to deteriorate rather fast, the family is trying to handle him at home, and does have sitters in the house 7 days a week now for 24 hours a day. They give the patient his medication and his wife to her medication, arrange the meals, and try to get him to exercise some. The family does not want to place him in a nursing home and the patient themselves absolutely refuse to go to assisted living. He is on Depakote 500 mg once a day by his psychiatrist, and patient now on Zoloft 100 mg a day, with some recent weight loss, and also taking them Namzaric  28 mg once a day, and on Zyprexa 5 mg a day. Neurontin seemed to make him worse that was discontinued. He is no longer driving. He has had progression of disease and no longer goes to work and his  watches him closely. Family has arranged a power of , living will and are considering long-term management. We discussed his condition with his family and office visit was 40 minutes today, 20 minutes of which was counseling the family. We tried a home health evaluation and therapy, but neither he nor his wife tolerated strangers in the house very well. The family notices that he is having more difficulty finishing sentences and finishing conversation.  He has had no other new focal weakness sensory loss visual changes or gait problems or bowel or bladder problems. He has had a normal CT of the head and normal metabolic screening for treatable causes of his memory loss recently on a hospital visit he was found to have urinary tract infection. We discussed urinary suppression, we will try vitamin C 1000 mg first. 
We had long counseling with patient and wife and her daughters, and he is to continue his Lexapro for anxiety and depression, and his wife's medications were adjusted. We also talked to the daughters, about family therapy. The patient has not had any new medical problems complications and or illnesses. He has a past history of memory loss, high blood pressure, anxiety and vasectomy. Past Medical History:  
Diagnosis Date  Anxiety disorder  Depression  Essential hypertension  Memory disorder  Neurological disorder Past Surgical History:  
Procedure Laterality Date [address] Family History Problem Relation Age of Onset  Dementia Mother  Heart Disease Father Social History Tobacco Use  Smoking status: Never Smoker  Smokeless tobacco: Never Used Substance Use Topics  Alcohol use: No  
   
Current Outpatient Medications Medication Sig Dispense Refill  divalproex ER (DEPAKOTE ER) 500 mg ER tablet Take 2 Tabs by mouth daily. 60 Tab 11  
 zonisamide (ZONEGRAN) 100 mg capsule Take 1 Cap by mouth daily. 90 Cap 5  carbidopa-levodopa (SINEMET)  mg per tablet Take 1 Tab by mouth three (3) times daily. 270 Tab 3  
 memantine-donepezil (NAMZARIC) 28-10 mg CSpX TAKE 1 CAPSULE BY MOUTH EVERY DAY 90 Cap 3  
 irbesartan (AVAPRO) 150 mg tablet Take 150 mg by mouth nightly.  sertraline (ZOLOFT) 100 mg tablet TAKE 1 TABLET BY MOUTH EVERY MORNING  5  
 ascorbic acid, vitamin C, (VITAMIN C) 500 mg tablet Take  by mouth two (2) times a day.  spironolactone (ALDACTONE) 25 mg tablet 25 mg two (2) times a day.  tamsulosin (FLOMAX) 0.4 mg capsule Take 0.4 mg by mouth daily.  vitamin e 400 unit tab Take  by mouth.  VIT C/TERELL AC/LUT/COPPER/ZNOX (PRESERVISION LUTEIN PO) Take  by mouth.  aspirin 81 mg tablet Take 81 mg by mouth.  multivitamins-minerals-lutein (CENTRUM SILVER) Tab Take  by mouth.  Cholecalciferol, Vitamin D3, (VITAMIN D3) 1,000 unit cap Take  by mouth. No Known Allergies Review of Systems: A comprehensive review of systems was negative except for: Neurological: positive for memory problems Objective: I 
 
 
NEUROLOGICAL EXAM: 
 
Appearance: The patient is well developed, well nourished, moderately overweight, provides a incoherent history and is in no acute distress. Mental Status: Oriented to place and person not the president and not the date. Patient can not remember one of 3 words at 30 seconds with distraction. Difficulty naming his children. Patient still a little mentally slow and has difficulty with words and names at times. Patient has difficult time doing clock drawing, and has moderate recent memory loss. Mood and affect appropriate. Cranial Nerves:   Intact visual fields. Fundi are benign, disc are flat, no lesions seen on funduscopy. JAQUELIN, EOM's full, no nystagmus, no ptosis. Facial sensation is normal. Corneal reflexes are not tested. Facial movement is symmetric, but patient has masklike facies. Hearing is abnormal bilaterally. Palate is midline with normal sternocleidomastoid and trapezius muscles are normal. Tongue is midline. Neck without meningismus or bruits Temporal arteries are not tender or enlarged Motor:  4/5 strength in upper and lower proximal and distal muscles. Normal bulk and increased tone eyes with cogwheel rigidity prominent in the upper extremities. . No fasciculations. Patient has decreased rapid alternating movement in all extremities Reflexes:   Deep tendon reflexes 2+/4 and symmetrical. 
 No Babinski or clonus present Sensory:   Normal to touch, pinprick and vibration and DSS. Gait:  Abnormal gait as patient is unsteady when he initially gets up and starts to move and on turns, and patient needs maximum assistance of one person to ambulate. Tremor:    Mild bilateral static and action tremor noted. Cerebellar:  Abnormal Romberg and tandem cerebellar signs present. Finger-nose-finger exam performed slowly but symmetrically Neurovascular:  Normal heart sounds and regular rhythm, peripheral pulses decreased, and no carotid bruits. Assessment:  
 
 
 
Plan:  
 
Patient with increasing sundowning from 5-7 each night, not sleeping well, increasing hallucinations and agitated behavior, we will increase his Depakote to 1000 mg at 5 PM at dinnertime each night, see if that helps the behavior and his not sleeping. Family advised GI side effects, pacing drowsiness or sedation or any side effect to call us immediately. We will continue his zonisamide, 100 mg twice a day for his Parkinson's disease and behavior and see how he does on that. With increasing weakness and difficulty walking, associated with parkinsonian-like features probably secondary to his neuroleptics of Zyprexa which she is currently off of. He will continue Sinemet 25/100 mg one half pill 3 times a day one half pill 3 times a day Moderate to severe dementia consistent with Alzheimer's disease We advised him to watch his weight gain, and the family will monitor that. He is to continue Lexapro and Namzaric and neither melatonin nor Neurontin were tolerated He is to continue his multivitamins and vitamin D on a regular basis and remained mentally and physically active He is no longer driving or working at this time Patient condition discussed with family and 20 minutes spent counseling the family, about the disease, medications and prognosis, and they were advised to start planning for long-term care, get power of  and living Will We will see him again in 6 months, earlier if needed. He will call if any problems. Discussed his diagnosis, treatment options and the possibility of new medications in the future with the patient and his family Signed By: Armand Saravia MD   
 February 22, 2019 This note will not be viewable in [address] If you have questions, please do not hesitate to call me. I look forward to following Mr. Josefina Cárdenas along with you. Sincerely, Armand Saravia MD 
 
 3

## 2020-04-27 DIAGNOSIS — F02.80 DEMENTIA OF THE ALZHEIMER'S TYPE WITH LATE ONSET WITHOUT BEHAVIORAL DISTURBANCE (HCC): ICD-10-CM

## 2020-04-27 DIAGNOSIS — G20 PARKINSON'S DISEASE (TREMOR, STIFFNESS, SLOW MOTION, UNSTABLE POSTURE) (HCC): ICD-10-CM

## 2020-04-27 DIAGNOSIS — G30.1 DEMENTIA OF THE ALZHEIMER'S TYPE WITH LATE ONSET WITHOUT BEHAVIORAL DISTURBANCE (HCC): ICD-10-CM

## 2020-04-27 RX ORDER — ZONISAMIDE 100 MG/1
CAPSULE ORAL
Qty: 90 CAP | Refills: 5 | Status: SHIPPED | OUTPATIENT
Start: 2020-04-27 | End: 2021-05-04

## 2020-08-13 ENCOUNTER — VIRTUAL VISIT (OUTPATIENT)
Dept: NEUROLOGY | Age: 75
End: 2020-08-13
Payer: MEDICARE

## 2020-08-13 ENCOUNTER — TELEPHONE (OUTPATIENT)
Dept: NEUROLOGY | Age: 75
End: 2020-08-13

## 2020-08-13 DIAGNOSIS — F05 SUNDOWN SYNDROME: ICD-10-CM

## 2020-08-13 DIAGNOSIS — F02.80 DEMENTIA OF THE ALZHEIMER'S TYPE WITH LATE ONSET WITHOUT BEHAVIORAL DISTURBANCE (HCC): ICD-10-CM

## 2020-08-13 DIAGNOSIS — G30.1 DEMENTIA OF THE ALZHEIMER'S TYPE WITH LATE ONSET WITHOUT BEHAVIORAL DISTURBANCE (HCC): ICD-10-CM

## 2020-08-13 DIAGNOSIS — F02.80 DEMENTIA OF THE ALZHEIMER'S TYPE WITH LATE ONSET WITHOUT BEHAVIORAL DISTURBANCE (HCC): Primary | ICD-10-CM

## 2020-08-13 DIAGNOSIS — F03.911 AGITATION DUE TO DEMENTIA: ICD-10-CM

## 2020-08-13 DIAGNOSIS — R68.89 SPELLS OF DECREASED ATTENTIVENESS: ICD-10-CM

## 2020-08-13 DIAGNOSIS — G30.1 DEMENTIA OF THE ALZHEIMER'S TYPE WITH LATE ONSET WITHOUT BEHAVIORAL DISTURBANCE (HCC): Primary | ICD-10-CM

## 2020-08-13 PROCEDURE — G8417 CALC BMI ABV UP PARAM F/U: HCPCS | Performed by: PSYCHIATRY & NEUROLOGY

## 2020-08-13 PROCEDURE — G8756 NO BP MEASURE DOC: HCPCS | Performed by: PSYCHIATRY & NEUROLOGY

## 2020-08-13 PROCEDURE — G9717 DOC PT DX DEP/BP F/U NT REQ: HCPCS | Performed by: PSYCHIATRY & NEUROLOGY

## 2020-08-13 PROCEDURE — 3017F COLORECTAL CA SCREEN DOC REV: CPT | Performed by: PSYCHIATRY & NEUROLOGY

## 2020-08-13 PROCEDURE — G8536 NO DOC ELDER MAL SCRN: HCPCS | Performed by: PSYCHIATRY & NEUROLOGY

## 2020-08-13 PROCEDURE — 99215 OFFICE O/P EST HI 40 MIN: CPT | Performed by: PSYCHIATRY & NEUROLOGY

## 2020-08-13 PROCEDURE — G8427 DOCREV CUR MEDS BY ELIG CLIN: HCPCS | Performed by: PSYCHIATRY & NEUROLOGY

## 2020-08-13 PROCEDURE — 1101F PT FALLS ASSESS-DOCD LE1/YR: CPT | Performed by: PSYCHIATRY & NEUROLOGY

## 2020-08-13 NOTE — PROGRESS NOTES
Dewaine Carrel is a 69 y.o. male who was seen by synchronous (real-time) audio-video technology on 8/13/2020. Dewaine Carrel is a 69 y.o. male who presents today for the following:  Chief Complaint   Patient presents with    Follow-up     spacing out at meals         HPI  Historical Data    Patient is known to the practice and has been previously seen by Dr. Lorna Cote and now retired Dr. 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   But continues on low-dose Sinemet    Alzheimer's dementia moderately severe  History of agitation and sundowning   Using Depakote for behavior control  He is also under the care of mental health    Interim Data:     Virtual visit was completed with her caregiver  Isauro Chilel  History is predominantly from the caregiver      Patient is being worked in urgently for new problem:  He is having spells where he \"spaces out \"and \"his eyes rolled back at [employer]"; happened 3 times, 2-5 mins in the course of 36 hours  VS stronge and no fever  Was eating each time  Shoulders jerking  No loss of control of B/B  Did not choke on food and no airway compromise    Sundowning and behavior well controlled      No Known Allergies    Current Outpatient Medications   Medication Sig    zonisamide (ZONEGRAN) 100 mg capsule TAKE 1 CAPSULE BY MOUTH EVERY DAY    memantine-donepeziL (NAMZARIC) 28-10 mg CSpX TAKE 1 CAPSULE BY MOUTH EVERY DAY    valsartan (DIOVAN) 80 mg tablet Take  by mouth daily.  sertraline (ZOLOFT) 100 mg tablet Take 150 mg by mouth daily.  carbidopa-levodopa (SINEMET)  mg per tablet Take 0.5 Tabs by mouth three (3) times daily.  divalproex ER (DEPAKOTE ER) 500 mg ER tablet Take 500 mg by mouth daily.  spironolactone (ALDACTONE) 25 mg tablet 25 mg two (2) times a day.  tamsulosin (FLOMAX) 0.4 mg capsule Take 0.4 mg by mouth daily.  aspirin 81 mg tablet Take 81 mg by mouth.  Cholecalciferol, Vitamin D3, (VITAMIN D3) 1,000 unit cap Take  by mouth.  multivitamins chew Take 2 Each by mouth. Vitafusion 2 gummies    VIT C/TERELL AC/LUT/COPPER/ZNOX (PRESERVISION LUTEIN PO) Take  by mouth. No current facility-administered medications for this visit. Past Medical History:   Diagnosis Date    Anxiety disorder     Depression     Essential hypertension     Memory disorder     Neurological disorder        Past Surgical History:   Procedure Laterality Date    HX APPENDECTOMY      HX OTHER SURGICAL      left big toe nail removed    HX VASECTOMY  1975       Family History   Problem Relation Age of Onset    Dementia Mother     Heart Disease Father        Social History     Socioeconomic History    Marital status:      Spouse name: Not on file    Number of children: Not on file    Years of education: Not on file    Highest education level: Not on file   Tobacco Use    Smoking status: Never Smoker    Smokeless tobacco: Never Used   Substance and Sexual Activity    Alcohol use: No    Drug use: No       ROS    A ten system review of constitutional, cardiovascular, respiratory, musculoskeletal, endocrine, skin, SHEENT, genitourinary, psychiatric and neurologic systems was obtained and is unremarkable except as mentioned under HPI          EXAMINATION    General appearance: Patient is well-developed and well-nourished in no apparent distress and well groomed. Psych/mental health:  Affect: Appropriate    PHQ  No flowsheet data found. HEENT: Normocephalic, without evidence of trauma      Cardiovascular: N/A    Respiratory:  normal effort on casual observation    Musculoskeletal: No evidence of significant bony deformities or spinal curvature    Integumentary:  No obvious bruising, lacerations or discoloaration on casual observation.     Neurological Examination:   Mental Status:        MMSE  No flowsheet data found.     Formal testing was not completed    He was able to answer very basic questions with very short answers not always making sense  He was alert    He could follow very basic questions    Cranial Nerves:    Difficult to assess virtually but no gross abnormalities appreciated    Motor:   Normal bulk  No tremor appreciated on today's exam  No abnormal movements appreciated on today's exam      Sensation: Not tested    Coordination/Cerebellar:   Not testable    Gait: Not tested    Fall risk assessment  Fall Risk Assessment, last 12 mths 7/17/2019   Able to walk? Yes   Fall in past 12 months? No         ASSESSMENT AND PLAN  Patient is known to this practice. This is my first time seeing the patient. Chart and history reviewed in detail at today's office visit. New onset spells of decreased alertness unclear etiology. Could be a vasovagal event. Will check head CT and will also check EEG  No medication changes recommended at this time  He is on Depakote and zonisamide for other reasons but I think it would give him adequate prophylaxis against seizures but I do think it is reasonable to look at an EEG since he has some shoulder jerking  There have been issues to the point where he was in the ICU due to aspiration and choking that required Heimlich maneuver  So more inclined to think that this is related to vasovagal during swallowing            ICD-10-CM ICD-9-CM    1. Dementia of the Alzheimer's type with late onset without behavioral disturbance ([facility] [address]  G30.1 331.0 CT HEAD WO CONT    F02.80 294.10    2. SunDown syndrome  F05 [id number]    3. Agitation due to dementia ([facility] [address]  F03.91 294.21    4. Spells of decreased attentiveness  R68.89 780.99 CT HEAD WO CONT      NEURO EEG 24 HR             Additional pertinent medical data reviewed at today's office visit         No visits with results within 3 Month(s) from this visit.    Latest known visit with results is:   No results displayed because visit has over 200 results. XR Results (maximum last 3): Results from [place] encounter on 06/14/19   XR ABD PORT  1 V    Impression IMPRESSION: Moderately dilated small bowel loops with some colonic gas noted. This may be secondary to an improving small bowel obstruction. XR RETROGRADE PYELOGRAM    Impression IMPRESSION:  1. INTERPRETATION PROVIDED FOR COMPLIANCE ONLY AT NO CHARGE     XR ABD (KUB)    Impression IMPRESSION: Nonspecific intestinal gas pattern. Nasogastric tube appears to be  in satisfactory position within the gastric fundus. CT Results (maximum last 3): Results from [place] encounter on 06/14/19   CT ABD PELV WO CONT    Impression IMPRESSION:  Probable perforated appendicitis   Incidental nonobstructing right renal calculi. Right basilar subsegmental atelectasis  A right hydroureter secondary to a 5 mm calculus at the pelvic inlet   This result was verbally relayed by me to Rachel Walter RN at 1432 hours. [address] WO CONT    Impression IMPRESSION:     Incidental right mid ureteral stone. Multilevel degenerative disc disease. Mild central canal stenosis L3-4. Significant osseous neural foraminal stenosis at L4-5 and L5-S1 as detailed. CT HEAD WO CONT    Impression IMPRESSION: Moderate white matter disease. No acute infarct. MRI Results (maximum last 3): No results found for this or any previous visit. Due to this being a TeleHealth evaluation, many elements of the physical examination are unable to be assessed. Pursuant to the emergency declaration under the [facility] waiver authority and the [employer] General Act, this Virtual  Visit was conducted, with patient's consent, to reduce the patient's risk of exposure to COVID-19 and provide continuity of care for an established patient.      Services were provided through a video synchronous discussion virtually to substitute for in-person clinic visit. Follow-up and Dispositions    · Return in about 4 weeks (around 9/10/2020) for Virtual visit.            Lincoln Torre MS, ANP-BC, [facility]

## 2020-08-13 NOTE — PATIENT INSTRUCTIONS
Unclear what the spells are it may be something as simple as having a vasovagal event is him to blackout while he is eating but organ to check a head CT to make sure there is nothing else going on and it also like to check an EEG to make sure there is no breakthrough seizure activity

## 2020-08-13 NOTE — TELEPHONE ENCOUNTER
Received call from pt's daughter, who was transferred by [facility] due to pt \"spacing out yesterday\": She said the pt did not have any slurred speech or,facial droopiness, and wants to know if Dr. Columba Johnson will order at head CT. She does not want to take the pt to the ER due to Covid. She would like a call back.

## 2020-08-13 NOTE — TELEPHONE ENCOUNTER
I called and since he has not been seen since 9/2019, it would be best to do a virtual visit to assess before ordering tests. Especially for insurance coverage reasons.    Scheduled

## 2020-08-17 ENCOUNTER — TELEPHONE (OUTPATIENT)
Dept: NEUROLOGY | Age: 75
End: 2020-08-17

## 2020-08-17 NOTE — TELEPHONE ENCOUNTER
CT Head w/o    Faxed note, ins cards, demo sheet and order to [facility] per pt request.     No P. A. required - Medicare.

## 2020-08-26 ENCOUNTER — TELEPHONE (OUTPATIENT)
Dept: NEUROLOGY | Age: 75
End: 2020-08-26

## 2020-08-26 DIAGNOSIS — F03.911 AGITATION DUE TO DEMENTIA: Primary | ICD-10-CM

## 2020-08-26 DIAGNOSIS — F05 SUNDOWN SYNDROME: ICD-10-CM

## 2020-08-26 DIAGNOSIS — R68.89 SPELLS OF DECREASED ATTENTIVENESS: ICD-10-CM

## 2020-08-26 RX ORDER — DIVALPROEX SODIUM 500 MG/1
500 TABLET, EXTENDED RELEASE ORAL DAILY
Qty: 90 TAB | Refills: 3 | Status: SHIPPED | OUTPATIENT
Start: 2020-08-26

## 2020-08-26 NOTE — TELEPHONE ENCOUNTER
----- Message from Vonda Ryan LPN sent at 5/77/3668 10:55 AM EDT -----  Regarding: FW: Prescription Question  Contact: [phone]    ----- Message -----  From: Isidro Good  Sent: 8/26/2020  10:51 AM EDT  To: Curtis Scruggs  Subject: Prescription Question                            I received a call yesterday from Michael Wolf and I was asked to call back with some correct dosing information on my dad's Depakote. He is taking 500mg qhs. If you have any further questions, please feel free to contact me by phone or thru messaging.      Thank you,    Fidencio Aranda

## 2020-10-12 ENCOUNTER — HOSPITAL ENCOUNTER (OUTPATIENT)
Dept: CT IMAGING | Age: 75
Discharge: HOME | End: 2020-10-12
Payer: MEDICARE

## 2020-10-12 PROCEDURE — 70450 CT HEAD/BRAIN W/O DYE: CPT | Performed by: PSYCHIATRY & NEUROLOGY

## 2021-01-08 DIAGNOSIS — F02.80 DEMENTIA OF THE ALZHEIMER'S TYPE WITH LATE ONSET WITHOUT BEHAVIORAL DISTURBANCE (HCC): ICD-10-CM

## 2021-01-08 DIAGNOSIS — G30.1 DEMENTIA OF THE ALZHEIMER'S TYPE WITH LATE ONSET WITHOUT BEHAVIORAL DISTURBANCE (HCC): ICD-10-CM

## 2021-01-08 RX ORDER — MEMANTINE HYDROCHLORIDE AND DONEPEZIL HYDROCHLORIDE 28; 10 MG/1; MG/1
CAPSULE ORAL
Qty: 90 CAP | Refills: 3 | Status: SHIPPED | OUTPATIENT
Start: 2021-01-08 | End: 2022-01-09

## 2021-05-04 DIAGNOSIS — G30.1 DEMENTIA OF THE ALZHEIMER'S TYPE WITH LATE ONSET WITHOUT BEHAVIORAL DISTURBANCE (HCC): ICD-10-CM

## 2021-05-04 DIAGNOSIS — G20 PARKINSON'S DISEASE (TREMOR, STIFFNESS, SLOW MOTION, UNSTABLE POSTURE) (HCC): ICD-10-CM

## 2021-05-04 DIAGNOSIS — F02.80 DEMENTIA OF THE ALZHEIMER'S TYPE WITH LATE ONSET WITHOUT BEHAVIORAL DISTURBANCE (HCC): ICD-10-CM

## 2021-05-04 RX ORDER — ZONISAMIDE 100 MG/1
CAPSULE ORAL
Qty: 90 CAP | Refills: 5 | Status: SHIPPED | OUTPATIENT
Start: 2021-05-04 | End: 2022-06-28

## 2022-01-08 DIAGNOSIS — G30.1 DEMENTIA OF THE ALZHEIMER'S TYPE WITH LATE ONSET WITHOUT BEHAVIORAL DISTURBANCE (HCC): ICD-10-CM

## 2022-01-08 DIAGNOSIS — F02.80 DEMENTIA OF THE ALZHEIMER'S TYPE WITH LATE ONSET WITHOUT BEHAVIORAL DISTURBANCE (HCC): ICD-10-CM

## 2022-01-09 RX ORDER — MEMANTINE HYDROCHLORIDE AND DONEPEZIL HYDROCHLORIDE 28; 10 MG/1; MG/1
CAPSULE ORAL
Qty: 90 CAPSULE | Refills: 3 | Status: SHIPPED | OUTPATIENT
Start: 2022-01-09

## 2022-03-11 RX ORDER — CARBIDOPA AND LEVODOPA 25; 100 MG/1; MG/1
0.5 TABLET ORAL 3 TIMES DAILY
Qty: 270 TABLET | Refills: 3 | Status: SHIPPED | OUTPATIENT
Start: 2022-03-11

## 2022-03-18 PROBLEM — K35.32 APPENDICITIS WITH PERFORATION: Status: ACTIVE | Noted: 2019-06-14

## 2022-03-19 PROBLEM — F03.911 AGITATION DUE TO DEMENTIA (HCC): Status: ACTIVE | Noted: 2020-08-13

## 2022-03-19 PROBLEM — G20 PARKINSON'S DISEASE (TREMOR, STIFFNESS, SLOW MOTION, UNSTABLE POSTURE) (HCC): Status: ACTIVE | Noted: 2017-12-28

## 2022-03-19 PROBLEM — R68.89 SPELLS OF DECREASED ATTENTIVENESS: Status: ACTIVE | Noted: 2020-08-13

## 2022-06-28 DIAGNOSIS — G30.1 DEMENTIA OF THE ALZHEIMER'S TYPE WITH LATE ONSET WITHOUT BEHAVIORAL DISTURBANCE (HCC): ICD-10-CM

## 2022-06-28 DIAGNOSIS — F02.80 DEMENTIA OF THE ALZHEIMER'S TYPE WITH LATE ONSET WITHOUT BEHAVIORAL DISTURBANCE (HCC): ICD-10-CM

## 2022-06-28 DIAGNOSIS — G20 PARKINSON'S DISEASE (TREMOR, STIFFNESS, SLOW MOTION, UNSTABLE POSTURE) (HCC): ICD-10-CM

## 2022-06-28 RX ORDER — ZONISAMIDE 100 MG/1
CAPSULE ORAL
Qty: 90 CAPSULE | Refills: 5 | Status: SHIPPED | OUTPATIENT
Start: 2022-06-28

## 2024-09-26 NOTE — MR AVS SNAPSHOT
[facility]        Patient is here for a  follow up.    Pt is currently taking Statin.    /82 (BP Location: Left arm, Patient Position: Chair, Cuff Size: Adult Small)   Pulse 64   LMP  (LMP Unknown)     The provider has been notified that the patient has no concerns.     Questions patient would like addressed today are: N/A.    Refills are needed: N/A    Has homecare services and agency name:  Kusum Freeman MA             Visit Information Date & Time Provider Department Dept. Phone Encounter #  
 6/27/2017  2:40 PM James Quezada MD Neurology Clinic at [facility] [phone] [id number] Follow-up Instructions Return in about 6 months (around 12/27/2017). Your Appointments 1/9/2018  2:00 PM  
Follow Up with James Quezada MD  
Neurology Clinic at [facility] Appt Note: f/u ,memory loss, jrb 6/27/17  
 [address], 
[address] [place] [address], [phone]  
[address], [address] [place] [address], [phone] Upcoming Health Maintenance Date Due Hepatitis C Screening 1945 DTaP/Tdap/Td series (1 - Tdap) 8/30/1968 FOBT Q 1 YEAR AGE 50-75 8/30/1997 ZOSTER VACCINE AGE 60> 8/30/2007 GLAUCOMA SCREENING Q2Y 8/30/2012 Pneumococcal 65+ Low/Medium Risk (1 of 2 - PCV13) 8/30/2012 MEDICARE YEARLY EXAM 8/30/2012 INFLUENZA AGE 9 TO ADULT 8/1/2017 Allergies as of 6/27/2017  Review Complete On: 6/27/2017 By: James Quezada MD  
 No Known Allergies Current Immunizations  Never Reviewed No immunizations on file. Not reviewed this visit You Were Diagnosed With   
  
 Codes Comments Dementia of the Alzheimer's type with late onset without behavioral disturbance    -  Primary ICD-10-CM: G30.1, F02.80 ICD-9-CM: 331.0, 294.10 Vitals BP Pulse Resp Height(growth percentile) Weight(growth percentile) SpO2  
 142/76 71 16 5' 11\" (1.803 m) 252 lb (114.3 kg) 97% BMI Smoking Status 35.15 kg/m2 Never Smoker Vitals History BMI and BSA Data Body Mass Index Body Surface Area  
 35.15 kg/m 2 2.39 m 2 Preferred Pharmacy Pharmacy Name Phone [facility] [address] [place] [address] Rd [phone] Your Updated Medication List  
  
   
 This list is accurate as of: 6/27/17  3:42 PM.  Always use your most recent med list.  
  
  
  
  
 aspirin 81 mg tablet Take 81 mg by mouth. BENADRYL 25 mg capsule Generic drug:  diphenhydrAMINE Take 25 mg by mouth nightly. CENTRUM SILVER Tab tablet Generic drug:  multivitamins-minerals-lutein Take  by mouth. divalproex  mg ER tablet Commonly known as:  DEPAKOTE ER  
  
 escitalopram oxalate 20 mg tablet Commonly known as:  Michael Palacios TAKE ONE TABLET BY MOUTH ONE TIME DAILY  
  
 irbesartan 150 mg tablet Commonly known as:  AVAPRO TAKE 1 TABLET BY MOUTH EVERY DAY  
  
 memantine-donepezil 28-10 mg Cspx Commonly known as:  MOTION [facility] Take 1 Cap by mouth daily. PRESERVISION LUTEIN PO Take  by mouth. spironolactone 25 mg tablet Commonly known as:  ALDACTONE  
  
 tamsulosin 0.4 mg capsule Commonly known as:  FLOMAX Take 0.4 mg by mouth daily. VITAMIN D3 1,000 unit Cap Generic drug:  cholecalciferol Take  by mouth.  
  
 vitamin e 400 unit Tab Take  by mouth. Follow-up Instructions Return in about 6 months (around 12/27/2017). Patient Instructions A Healthy Lifestyle: Care Instructions Your Care Instructions A healthy lifestyle can help you feel good, stay at a healthy weight, and have plenty of energy for both work and play. A healthy lifestyle is something you can share with your whole family. A healthy lifestyle also can lower your risk for serious health problems, such as high blood pressure, heart disease, and diabetes. You can follow a few steps listed below to improve your health and the health of your family. Follow-up care is a key part of your treatment and safety. Be sure to make and go to all appointments, and call your doctor if you are having problems. Its also a good idea to know your test results and keep a list of the medicines you take. How can you care for yourself at home? · Do not eat too much sugar, fat, or fast foods. You can still have dessert and treats now and then. The goal is moderation. · Start small to improve your eating habits. Pay attention to portion sizes, drink less juice and soda pop, and eat more fruits and vegetables. ¨ Eat a healthy amount of food. A 3-ounce serving of meat, for example, is about the size of a deck of cards. Fill the rest of your plate with vegetables and whole grains. ¨ Limit the amount of soda and sports drinks you have every day. Drink more water when you are thirsty. ¨ Eat at least 5 servings of fruits and vegetables every day. It may seem like a lot, but it is not hard to reach this goal. A serving or helping is 1 piece of fruit, 1 cup of vegetables, or 2 cups of leafy, raw vegetables. Have an apple or some carrot sticks as an afternoon snack instead of a candy bar. Try to have fruits and/or vegetables at every meal. 
· Make exercise part of your daily routine. You may want to start with simple activities, such as walking, bicycling, or slow swimming. Try to be active 30 to 60 minutes every day. You do not need to do all 30 to 60 minutes all at once. For example, you can exercise 3 times a day for 10 or 20 minutes. Moderate exercise is safe for most people, but it is always a good idea to talk to your doctor before starting an exercise program. 
· Keep moving. Kirill Dies the lawn, work in the garden, or [place]. Take the stairs instead of the elevator at work. · If you smoke, quit. People who smoke have an increased risk for heart attack, stroke, cancer, and other lung illnesses. Quitting is hard, but there are ways to boost your chance of quitting tobacco for good. ¨ Use nicotine gum, patches, or lozenges. ¨ Ask your doctor about stop-smoking programs and medicines. ¨ Keep trying.  
In addition to reducing your risk of diseases in the future, you will notice some benefits soon after you stop using tobacco. If you have shortness of breath or asthma symptoms, they will likely get better within a few weeks after you quit. · Limit how much alcohol you drink. Moderate amounts of alcohol (up to 2 drinks a day for men, 1 drink a day for women) are okay. But drinking too much can lead to liver problems, high blood pressure, and other health problems. Family health If you have a family, there are many things you can do together to improve your health. · Eat meals together as a family as often as possible. · Eat healthy foods. This includes fruits, vegetables, lean meats and dairy, and whole grains. · Include your family in your fitness plan. Most people think of activities such as jogging or tennis as the way to fitness, but there are many ways you and your family can be more active. Anything that makes you breathe hard and gets your heart pumping is exercise. Here are some tips: 
¨ Walk to do errands or to take your child to school or the bus. ¨ Go for a family bike ride after dinner instead of watching TV. Where can you learn more? Go to http://carolyn-warner.info/. Enter O365 in the search box to learn more about \"A Healthy Lifestyle: Care Instructions. \" Current as of: July 26, 2016 Content Version: 11.3 © 2490-2921 [employer]. Care instructions adapted under license by [employer] (which disclaims liability or warranty for this information). If you have questions about a medical condition or this instruction, always ask your healthcare professional. [place] [address] any warranty or liability for your use of this information. Please provide this summary of care documentation to your next provider. Your primary care clinician is listed as Melida Anthony. If you have any questions after today's visit, please call [phone].

## (undated) DEVICE — ELECTRODE ES 36CM LAP FLAT L HK COAT DISP CLEANCOAT

## (undated) DEVICE — INTELLIGENT RELOAD: Brand: TRI-STAPLE 2.0

## (undated) DEVICE — TUBING INSUFLTN 10FT LUER -- CONVERT TO ITEM 368568

## (undated) DEVICE — STERILE POLYISOPRENE POWDER-FREE SURGICAL GLOVES WITH EMOLLIENT COATING: Brand: PROTEXIS

## (undated) DEVICE — SUT ETHLN 2-0 18IN FS BLK --

## (undated) DEVICE — KENDALL SCD EXPRESS SLEEVES, KNEE LENGTH, MEDIUM: Brand: KENDALL SCD

## (undated) DEVICE — CLICKLINE SCISSORS INSERT: Brand: CLICKLINE

## (undated) DEVICE — SUT SLK 2-0SH 30IN BLK --

## (undated) DEVICE — 3000CC GUARDIAN II: Brand: GUARDIAN

## (undated) DEVICE — REM POLYHESIVE ADULT PATIENT RETURN ELECTRODE: Brand: VALLEYLAB

## (undated) DEVICE — UNIVERSAL FIXATION CANNULA: Brand: VERSAONE

## (undated) DEVICE — Device

## (undated) DEVICE — TRAY CATH 16F DRN BG LTX -- CONVERT TO ITEM 363158

## (undated) DEVICE — INFECTION CONTROL KIT SYS

## (undated) DEVICE — CORDLESS ULTRASONIC DISSECTOR: Brand: SONICISION

## (undated) DEVICE — (D)PREP SKN CHLRAPRP APPL 26ML -- CONVERT TO ITEM 371833

## (undated) DEVICE — STRAP,POSITIONING,KNEE/BODY,FOAM,4X60": Brand: MEDLINE

## (undated) DEVICE — DRAIN CHN 19FR L0.25MM DIA6.3MM SIL RND HUBLESS FULL FLUT

## (undated) DEVICE — FILTER SMK EVAC FLO CLR MEGADYNE

## (undated) DEVICE — SUTURE SZ 0 27IN 5/8 CIR UR-6  TAPER PT VIOLET ABSRB VICRYL J603H

## (undated) DEVICE — BLADELESS OPTICAL TROCAR WITH FIXATION CANNULA: Brand: VERSAONE

## (undated) DEVICE — TROCAR SITE CLOSURE DEVICE: Brand: ENDO CLOSE

## (undated) DEVICE — GDWIRE UROL STR 150CM FLX TP -- BX/5 SENSOR

## (undated) DEVICE — APPLICATOR BNDG 1MM ADH PREMIERPRO EXOFIN

## (undated) DEVICE — VISUALIZATION SYSTEM: Brand: CLEARIFY

## (undated) DEVICE — UNIVERSAL STAPLER: Brand: ENDO GIA ULTRA

## (undated) DEVICE — NEEDLE HYPO 22GA L1.5IN BLK S STL HUB POLYPR SHLD REG BVL

## (undated) DEVICE — SUTURE MCRYL SZ 4-0 L27IN ABSRB UD L19MM PS-2 1/2 CIR PRIM Y426H

## (undated) DEVICE — BLADELESS OPTICAL TROCAR WITH FIXATION CANNULA: Brand: VERSAPORT

## (undated) DEVICE — SURGICAL PROCEDURE KIT GEN LAPAROSCOPY LF

## (undated) DEVICE — DRAPE,UTILTY,TAPE,15X26, 4EA/PK: Brand: MEDLINE